# Patient Record
Sex: FEMALE | Race: WHITE | Employment: PART TIME | URBAN - METROPOLITAN AREA
[De-identification: names, ages, dates, MRNs, and addresses within clinical notes are randomized per-mention and may not be internally consistent; named-entity substitution may affect disease eponyms.]

---

## 2017-01-04 ENCOUNTER — MED REC SCAN ONLY (OUTPATIENT)
Dept: FAMILY MEDICINE CLINIC | Facility: CLINIC | Age: 37
End: 2017-01-04

## 2017-01-06 NOTE — TELEPHONE ENCOUNTER
From: Adithya Garnica  To:  Fatuma Cortes MD  Sent: 1/6/2017 3:25 PM CST  Subject: Medication Renewal Request    Original authorizing provider: MD Adithya Silva would like a refill of the following medications:  alprazolam 0.25 MG Oral T

## 2017-01-07 RX ORDER — ALPRAZOLAM 0.25 MG/1
0.25 TABLET ORAL NIGHTLY PRN
Qty: 20 TABLET | Refills: 0 | Status: SHIPPED
Start: 2017-01-07 | End: 2017-03-22

## 2017-01-14 ENCOUNTER — OFFICE VISIT (OUTPATIENT)
Dept: FAMILY MEDICINE CLINIC | Facility: CLINIC | Age: 37
End: 2017-01-14

## 2017-01-14 DIAGNOSIS — E53.8 B12 DEFICIENCY: Primary | ICD-10-CM

## 2017-01-14 PROCEDURE — 96372 THER/PROPH/DIAG INJ SC/IM: CPT | Performed by: NURSE PRACTITIONER

## 2017-01-14 RX ORDER — CYANOCOBALAMIN 1000 UG/ML
1000 INJECTION INTRAMUSCULAR; SUBCUTANEOUS ONCE
Status: COMPLETED | OUTPATIENT
Start: 2017-01-14 | End: 2017-01-14

## 2017-01-14 RX ADMIN — CYANOCOBALAMIN 1000 MCG: 1000 INJECTION INTRAMUSCULAR; SUBCUTANEOUS at 09:57:00

## 2017-02-03 ENCOUNTER — APPOINTMENT (OUTPATIENT)
Dept: LAB | Age: 37
End: 2017-02-03
Attending: FAMILY MEDICINE
Payer: COMMERCIAL

## 2017-02-03 ENCOUNTER — NURSE ONLY (OUTPATIENT)
Dept: FAMILY MEDICINE CLINIC | Facility: CLINIC | Age: 37
End: 2017-02-03

## 2017-02-03 DIAGNOSIS — E53.8 VITAMIN B 12 DEFICIENCY: ICD-10-CM

## 2017-02-03 DIAGNOSIS — E53.8 B12 DEFICIENCY: Primary | ICD-10-CM

## 2017-02-03 LAB
FOLATE (FOLIC ACID), SERUM: 26 NG/ML (ref 8.7–24)
HAV AB SERPL IA-ACNC: >2000 PG/ML (ref 193–986)

## 2017-02-03 PROCEDURE — 82607 VITAMIN B-12: CPT

## 2017-02-03 PROCEDURE — 82746 ASSAY OF FOLIC ACID SERUM: CPT

## 2017-02-03 PROCEDURE — 96372 THER/PROPH/DIAG INJ SC/IM: CPT | Performed by: FAMILY MEDICINE

## 2017-02-03 PROCEDURE — 36415 COLL VENOUS BLD VENIPUNCTURE: CPT

## 2017-02-03 RX ORDER — CYANOCOBALAMIN 1000 UG/ML
1000 INJECTION INTRAMUSCULAR; SUBCUTANEOUS ONCE
Status: COMPLETED | OUTPATIENT
Start: 2017-02-03 | End: 2017-02-03

## 2017-02-03 RX ADMIN — CYANOCOBALAMIN 1000 MCG: 1000 INJECTION INTRAMUSCULAR; SUBCUTANEOUS at 14:05:00

## 2017-02-10 ENCOUNTER — TELEPHONE (OUTPATIENT)
Dept: FAMILY MEDICINE CLINIC | Facility: CLINIC | Age: 37
End: 2017-02-10

## 2017-02-10 DIAGNOSIS — E83.110 HEMOCHROMATOSIS, HEREDITARY (HCC): ICD-10-CM

## 2017-02-10 DIAGNOSIS — E53.8 FOLIC ACID DEFICIENCY: ICD-10-CM

## 2017-02-10 DIAGNOSIS — E53.8 VITAMIN B12 DEFICIENCY: Primary | ICD-10-CM

## 2017-02-10 NOTE — TELEPHONE ENCOUNTER
----- Message from Marion Baer MD sent at 2/10/2017 10:17 AM CST -----  Hold b12 for 1 month, then resume same, recheck b12 and folate in 3 months.

## 2017-02-17 NOTE — PROGRESS NOTES
Chief Complaint:   Patient presents with:  Medication Follow-Up    HPI:   This is a 43year old female presenting for follow up on MAKAYLA.     Patient's on Lexapro 10 mg daily denies any side effects doing well on it reports 3 month history of increased irrita Current Meds:    Current Outpatient Prescriptions:  escitalopram (LEXAPRO) 10 MG Oral Tab 1.5 tablet q daily Disp: 45 tablet Rfl: 2   alprazolam 0.25 MG Oral Tab Take 1 tablet (0.25 mg total) by mouth nightly as needed for Sleep.  Disp: 20 tablet Rfl: Positive for sleep disturbance. Negative for suicidal ideas, behavioral problems and agitation. The patient is nervous/anxious.         EXAM:   /80 mmHg  Pulse 64  Resp 16  Wt 163 lb  LMP 06/04/2014 Estimated body mass index is 28.17 kg/(m^2) as calcu time. She displays normal reflexes. No cranial nerve deficit, sensory deficit or motor deficit. Coordination and gait normal.   Skin: Skin is warm and dry. No lesion and no rash noted. No erythema. No pallor.  Does not exhibit alopecia  Psychiatric: Her beh

## 2017-02-27 RX ORDER — ESCITALOPRAM OXALATE 10 MG/1
TABLET ORAL
Qty: 30 TABLET | Refills: 2 | OUTPATIENT
Start: 2017-02-27

## 2017-02-28 RX ORDER — ERGOCALCIFEROL 1.25 MG/1
CAPSULE ORAL
Qty: 4 CAPSULE | Refills: 3 | OUTPATIENT
Start: 2017-02-28

## 2017-03-02 ENCOUNTER — PATIENT MESSAGE (OUTPATIENT)
Dept: FAMILY MEDICINE CLINIC | Facility: CLINIC | Age: 37
End: 2017-03-02

## 2017-03-03 NOTE — TELEPHONE ENCOUNTER
From: George Dobson  To: Hansa Walter MD  Sent: 3/2/2017 6:45 PM CST  Subject: Prescription Question    Hi Dr. Neville Schulte,    I'm wondering why I received a message via [employer] informing me that my prescription was denied for my VitD?  I thought at my last

## 2017-03-03 NOTE — TELEPHONE ENCOUNTER
Lay see result note dated 10/29/16. Patient advised per result note to take over the counter vitamin D, 5,000 units daily and to repeat her vitamin D lab in May. Advised to call if any questions or concerns.

## 2017-03-10 ENCOUNTER — APPOINTMENT (OUTPATIENT)
Dept: HEMATOLOGY/ONCOLOGY | Facility: HOSPITAL | Age: 37
End: 2017-03-10
Attending: INTERNAL MEDICINE
Payer: COMMERCIAL

## 2017-03-21 ENCOUNTER — NURSE ONLY (OUTPATIENT)
Dept: HEMATOLOGY/ONCOLOGY | Facility: HOSPITAL | Age: 37
End: 2017-03-21
Attending: INTERNAL MEDICINE
Payer: COMMERCIAL

## 2017-03-21 VITALS
OXYGEN SATURATION: 99 % | RESPIRATION RATE: 18 BRPM | BODY MASS INDEX: 29.38 KG/M2 | SYSTOLIC BLOOD PRESSURE: 144 MMHG | WEIGHT: 170 LBS | TEMPERATURE: 99 F | DIASTOLIC BLOOD PRESSURE: 89 MMHG | HEIGHT: 63.78 IN | HEART RATE: 72 BPM

## 2017-03-21 LAB — HEMOGLOBIN POC: 13.5 G/DL

## 2017-03-21 PROCEDURE — 36416 COLLJ CAPILLARY BLOOD SPEC: CPT

## 2017-03-21 PROCEDURE — 85018 HEMOGLOBIN: CPT

## 2017-03-21 PROCEDURE — 99195 PHLEBOTOMY: CPT

## 2017-03-21 NOTE — PROGRESS NOTES
Patient Name: Attila Ruiz  : 1980   Medical Record #: [id number]      Therapeutic Donor Physical and Record of Collection    Order:  Therapeutic Phlebotomy  Order Date:  2016  Expiration Date:  2017  Frequency:  Every three months.

## 2017-03-21 NOTE — PROGRESS NOTES
Patient Name: Charles Habermann  : 1980   Medical Record #: [id number]      Therapeutic Donor History    Donor History    Are you feeling ill or unhealthy today:  No    Do you currently have a cold, flu, sore throat, chills, respiratory infection, explain below:       Pathologist consulted:  No   Pathologist's name:   Outcome / Orders placed:

## 2017-03-22 RX ORDER — ALPRAZOLAM 0.25 MG/1
0.25 TABLET ORAL NIGHTLY PRN
Qty: 20 TABLET | Refills: 0 | Status: SHIPPED
Start: 2017-03-22 | End: 2017-05-19

## 2017-03-22 NOTE — TELEPHONE ENCOUNTER
From: Margo Elena  To:  Praveen White MD  Sent: 3/21/2017 8:36 PM CDT  Subject: Medication Renewal Request    Original authorizing provider: MD Margo Cisneros would like a refill of the following medications:  alprazolam 0.25 MG Oral

## 2017-05-08 ENCOUNTER — PATIENT MESSAGE (OUTPATIENT)
Dept: FAMILY MEDICINE CLINIC | Facility: CLINIC | Age: 37
End: 2017-05-08

## 2017-05-09 NOTE — TELEPHONE ENCOUNTER
From: Kiley Thomas  To: Kirstin Hurst MD  Sent: 5/8/2017 8:19 PM CDT  Subject: Non-Urgent Medical Question    Hi Dr. Nicole Holliday-    I have a f/u scheduled with you this Friday, May 11th and am wondering if I was supposed to do my labs before seeing you?  I

## 2017-05-10 ENCOUNTER — TELEPHONE (OUTPATIENT)
Dept: FAMILY MEDICINE CLINIC | Facility: CLINIC | Age: 37
End: 2017-05-10

## 2017-05-16 ENCOUNTER — TELEPHONE (OUTPATIENT)
Dept: FAMILY MEDICINE CLINIC | Facility: CLINIC | Age: 37
End: 2017-05-16

## 2017-05-16 RX ORDER — ESCITALOPRAM OXALATE 10 MG/1
TABLET ORAL
Qty: 45 TABLET | Refills: 2 | Status: SHIPPED | OUTPATIENT
Start: 2017-05-16 | End: 2017-06-22

## 2017-05-19 NOTE — PROGRESS NOTES
Chief Complaint:   Patient presents with:  Medication Follow-Up: refilled    HPI:   This is a 42year old female presenting for follow up on MAKAYLA.     Patient's on Lexapro 15 mg daily denies any side effects doing well on it reports better with increased dos Meds:    Current Outpatient Prescriptions:  Eszopiclone (LUNESTA) 2 MG Oral Tab Take 1 tablet (2 mg total) by mouth nightly as needed. Disp: 30 tablet Rfl: 2   ALPRAZolam 0.25 MG Oral Tab Take 1 tablet (0.25 mg total) by mouth nightly as needed for Sleep. Positive for sleep disturbance. Negative for suicidal ideas, behavioral problems and agitation. The patient is nervous/anxious.         EXAM:   /74 mmHg  Pulse 88  Temp(Src) 98.4 °F (36.9 °C) (Oral)  Resp 16  SpO2 97%  LMP 06/04/2014 Estimated body ma oriented to person, place, and time. She displays normal reflexes. No cranial nerve deficit, sensory deficit or motor deficit. Coordination and gait normal.   Skin: Skin is warm and dry. No lesion and no rash noted. No erythema. No pallor.  Does not exhibit

## 2017-06-01 ENCOUNTER — APPOINTMENT (OUTPATIENT)
Dept: LAB | Age: 37
End: 2017-06-01
Attending: INTERNAL MEDICINE
Payer: COMMERCIAL

## 2017-06-01 ENCOUNTER — LAB ENCOUNTER (OUTPATIENT)
Dept: LAB | Age: 37
End: 2017-06-01
Attending: FAMILY MEDICINE
Payer: COMMERCIAL

## 2017-06-01 DIAGNOSIS — Z13.0 SCREENING FOR ENDOCRINE, NUTRITIONAL, METABOLIC AND IMMUNITY DISORDER: ICD-10-CM

## 2017-06-01 DIAGNOSIS — Z13.29 SCREENING FOR ENDOCRINE, NUTRITIONAL, METABOLIC AND IMMUNITY DISORDER: ICD-10-CM

## 2017-06-01 DIAGNOSIS — E83.110 HEMOCHROMATOSIS, HEREDITARY (HCC): ICD-10-CM

## 2017-06-01 DIAGNOSIS — Z13.228 SCREENING FOR ENDOCRINE, NUTRITIONAL, METABOLIC AND IMMUNITY DISORDER: ICD-10-CM

## 2017-06-01 DIAGNOSIS — E55.9 VITAMIN D DEFICIENCY: ICD-10-CM

## 2017-06-01 DIAGNOSIS — E53.8 VITAMIN B12 DEFICIENCY: ICD-10-CM

## 2017-06-01 DIAGNOSIS — Z13.21 SCREENING FOR ENDOCRINE, NUTRITIONAL, METABOLIC AND IMMUNITY DISORDER: ICD-10-CM

## 2017-06-01 PROCEDURE — 82746 ASSAY OF FOLIC ACID SERUM: CPT

## 2017-06-01 PROCEDURE — 82306 VITAMIN D 25 HYDROXY: CPT

## 2017-06-01 PROCEDURE — 82607 VITAMIN B-12: CPT

## 2017-06-01 PROCEDURE — 83550 IRON BINDING TEST: CPT

## 2017-06-01 PROCEDURE — 80061 LIPID PANEL: CPT

## 2017-06-01 PROCEDURE — 80053 COMPREHEN METABOLIC PANEL: CPT

## 2017-06-01 PROCEDURE — 83540 ASSAY OF IRON: CPT

## 2017-06-01 PROCEDURE — 85025 COMPLETE CBC W/AUTO DIFF WBC: CPT

## 2017-06-01 PROCEDURE — 84443 ASSAY THYROID STIM HORMONE: CPT

## 2017-06-01 PROCEDURE — 82728 ASSAY OF FERRITIN: CPT

## 2017-06-02 ENCOUNTER — APPOINTMENT (OUTPATIENT)
Dept: HEMATOLOGY/ONCOLOGY | Facility: HOSPITAL | Age: 37
End: 2017-06-02
Attending: INTERNAL MEDICINE
Payer: COMMERCIAL

## 2017-06-02 VITALS
HEART RATE: 73 BPM | OXYGEN SATURATION: 98 % | SYSTOLIC BLOOD PRESSURE: 151 MMHG | DIASTOLIC BLOOD PRESSURE: 89 MMHG | BODY MASS INDEX: 29.47 KG/M2 | TEMPERATURE: 97 F | WEIGHT: 170.5 LBS | HEIGHT: 63.78 IN | RESPIRATION RATE: 18 BRPM

## 2017-06-02 DIAGNOSIS — E83.110 HEMOCHROMATOSIS, HEREDITARY (HCC): Primary | ICD-10-CM

## 2017-06-02 PROCEDURE — 99213 OFFICE O/P EST LOW 20 MIN: CPT | Performed by: INTERNAL MEDICINE

## 2017-06-02 NOTE — PROGRESS NOTES
Education Record    Learner:  Patient    Disease / Diagnosis:    Barriers / Limitations:  None   Comments:    Method:  Discussion   Comments:    General Topics:  Plan of care reviewed   Comments:    Outcome:  Shows understanding   Comments:    Repeat labs

## 2017-06-02 NOTE — PROGRESS NOTES
Cancer Center Progress Note  Patient Name: George Dobson   YOB: 1980   Medical Record Number: [id number]     Attending Physician: Jl Diaz M.D. Date of Visit: 6/2/2017       Chief Complaint:  Patient presents with:   Sade Stain polyps    HYSTERECTOMY  6/2014    Comment partial, still has ovaries       Family History:  Family History   Problem Relation Age of Onset   • Hypertension Mother    • Hypertension Brother    • Hypertension Sister    • Cancer Maternal Aunt 36     breast diplopia. No yellowing. Hematologic/Lymphatic Normal - No easy bruising or bleeding. No tender or palpable lymph nodes. Respiratory No dyspnea, pleuritic chest pain, cough or hemoptysis.    Cardiovascular No anginal chest pain, palpitations or orthopne 6/1/2017 12:00   Glucose Latest Ref Range: 70-99 mg/dL 97   Sodium Latest Ref Range: 136-144 mmol/L 144   Potassium Latest Ref Range: 3.6-5.1 mmol/L 4.6   Chloride Latest Ref Range: 101-111 mmol/L 107   Carbon Dioxide, Total Latest Ref Range: 22.0-32.0 mmo Signed by: Gopi Espinal M.D.   [facility]

## 2017-06-22 RX ORDER — ESCITALOPRAM OXALATE 10 MG/1
15 TABLET ORAL DAILY
Qty: 45 TABLET | Refills: 0 | Status: SHIPPED | OUTPATIENT
Start: 2017-06-22 | End: 2017-10-02

## 2017-06-22 NOTE — TELEPHONE ENCOUNTER
From: Mian Keith  To:  Bárbara Frausto MD  Sent: 6/21/2017 1:07 PM CDT  Subject: Medication Renewal Request    Original authorizing provider: MD Mian Wolf would like a refill of the following medications:  ESCITALOPRAM 10 MG Oral

## 2017-08-21 ENCOUNTER — PATIENT MESSAGE (OUTPATIENT)
Dept: FAMILY MEDICINE CLINIC | Facility: CLINIC | Age: 37
End: 2017-08-21

## 2017-08-21 DIAGNOSIS — R61 EXCESSIVE SWEATING: Primary | ICD-10-CM

## 2017-08-21 DIAGNOSIS — R23.2 HOT FLASHES: ICD-10-CM

## 2017-08-21 NOTE — TELEPHONE ENCOUNTER
From: Dakotah Jose  To: Nabil Huffman MD  Sent: 8/21/2017 9:06 AM CDT  Subject: Non-Urgent Medical Question    Hi Dr. Mounika Garnica,    I'm having constant hot flashes and night sweats that have been getting worse over the past few months.  I'm due for my labs

## 2017-08-24 ENCOUNTER — LAB ENCOUNTER (OUTPATIENT)
Dept: LAB | Age: 37
End: 2017-08-24
Attending: FAMILY MEDICINE
Payer: COMMERCIAL

## 2017-08-24 DIAGNOSIS — R23.2 HOT FLASHES: ICD-10-CM

## 2017-08-24 DIAGNOSIS — E83.110 HEMOCHROMATOSIS, HEREDITARY (HCC): ICD-10-CM

## 2017-08-24 DIAGNOSIS — R61 EXCESSIVE SWEATING: ICD-10-CM

## 2017-08-24 LAB
BASOPHILS # BLD AUTO: 0.05 X10(3) UL (ref 0–0.1)
BASOPHILS NFR BLD AUTO: 0.6 %
DEPRECATED HBV CORE AB SER IA-ACNC: 33.4 NG/ML (ref 10–291)
EOSINOPHIL # BLD AUTO: 0.06 X10(3) UL (ref 0–0.3)
EOSINOPHIL NFR BLD AUTO: 0.8 %
ERYTHROCYTE [DISTWIDTH] IN BLOOD BY AUTOMATED COUNT: 12.8 % (ref 11.5–16)
HCT VFR BLD AUTO: 40.1 % (ref 34–50)
HGB BLD-MCNC: 13.6 G/DL (ref 12–16)
IMMATURE GRANULOCYTE COUNT: 0.01 X10(3) UL (ref 0–1)
IMMATURE GRANULOCYTE RATIO %: 0.1 %
IRON SATURATION: 30 % (ref 13–45)
IRON: 110 UG/DL (ref 28–170)
LYMPHOCYTES # BLD AUTO: 2.31 X10(3) UL (ref 0.9–4)
LYMPHOCYTES NFR BLD AUTO: 29.5 %
MCH RBC QN AUTO: 32.6 PG (ref 27–33.2)
MCHC RBC AUTO-ENTMCNC: 33.9 G/DL (ref 31–37)
MCV RBC AUTO: 96.2 FL (ref 81–100)
MONOCYTES # BLD AUTO: 0.52 X10(3) UL (ref 0.1–0.6)
MONOCYTES NFR BLD AUTO: 6.6 %
NEUTROPHIL ABS PRELIM: 4.88 X10 (3) UL (ref 1.3–6.7)
NEUTROPHILS # BLD AUTO: 4.88 X10(3) UL (ref 1.3–6.7)
NEUTROPHILS NFR BLD AUTO: 62.4 %
PLATELET # BLD AUTO: 280 10(3)UL (ref 150–450)
RBC # BLD AUTO: 4.17 X10(6)UL (ref 3.8–5.1)
RED CELL DISTRIBUTION WIDTH-SD: 45.1 FL (ref 35.1–46.3)
TOTAL IRON BINDING CAPACITY: 361 UG/DL (ref 298–536)
TRANSFERRIN: 242 MG/DL (ref 200–360)
WBC # BLD AUTO: 7.8 X10(3) UL (ref 4–13)

## 2017-08-24 PROCEDURE — 82728 ASSAY OF FERRITIN: CPT

## 2017-08-24 PROCEDURE — 83001 ASSAY OF GONADOTROPIN (FSH): CPT

## 2017-08-24 PROCEDURE — 82627 DEHYDROEPIANDROSTERONE: CPT

## 2017-08-24 PROCEDURE — 83540 ASSAY OF IRON: CPT

## 2017-08-24 PROCEDURE — 83550 IRON BINDING TEST: CPT

## 2017-08-24 PROCEDURE — 85025 COMPLETE CBC W/AUTO DIFF WBC: CPT

## 2017-08-24 PROCEDURE — 83002 ASSAY OF GONADOTROPIN (LH): CPT

## 2017-08-25 LAB
FSH: 6.1 MIU/ML
LH: 3 MIU/ML

## 2017-08-27 LAB — DEHYDROEPIANDROSTERONE BY TMS: 2.27 NG/ML

## 2017-09-18 RX ORDER — ESCITALOPRAM OXALATE 10 MG/1
TABLET ORAL
Qty: 45 TABLET | Refills: 0 | Status: SHIPPED | OUTPATIENT
Start: 2017-09-18 | End: 2017-10-02

## 2017-09-18 NOTE — TELEPHONE ENCOUNTER
ESCITALOPRAM 10 MG TABLET  Will file in chart as: ESCITALOPRAM 10 MG Oral Tab  The source prescription was discontinued on 6/22/2017 by Bárbara Frausto MD.  TAKE 1 & 1/2 TABLETS BY MOUTH DAILY       Disp: 45 tablet Refills: 0    Class: Normal Start: 9/18/201

## 2017-10-02 RX ORDER — ESCITALOPRAM OXALATE 10 MG/1
TABLET ORAL
Qty: 135 TABLET | Refills: 0 | Status: SHIPPED | OUTPATIENT
Start: 2017-10-02 | End: 2018-01-02

## 2017-10-02 RX ORDER — ESCITALOPRAM OXALATE 10 MG/1
TABLET ORAL
Qty: 45 TABLET | Refills: 0 | Status: SHIPPED | OUTPATIENT
Start: 2017-10-02 | End: 2017-10-02

## 2017-10-02 NOTE — TELEPHONE ENCOUNTER
[facility] states per ins it has to be a 90day refill and we keep responding with a 30 day ins will not cover

## 2017-10-02 NOTE — TELEPHONE ENCOUNTER
LF: today but patient needs a 90 day supply. Please approve or deny pending 90 day supply Rx for patient. Thank you!

## 2017-10-11 DIAGNOSIS — E83.110 HEMOCHROMATOSIS, HEREDITARY (HCC): Primary | ICD-10-CM

## 2017-10-20 ENCOUNTER — OFFICE VISIT (OUTPATIENT)
Dept: FAMILY MEDICINE CLINIC | Facility: CLINIC | Age: 37
End: 2017-10-20

## 2017-10-20 VITALS
BODY MASS INDEX: 30.38 KG/M2 | OXYGEN SATURATION: 98 % | SYSTOLIC BLOOD PRESSURE: 122 MMHG | WEIGHT: 175.75 LBS | TEMPERATURE: 98 F | HEART RATE: 74 BPM | RESPIRATION RATE: 16 BRPM | HEIGHT: 63.78 IN | DIASTOLIC BLOOD PRESSURE: 74 MMHG

## 2017-10-20 DIAGNOSIS — E83.110 HEMOCHROMATOSIS, HEREDITARY (HCC): ICD-10-CM

## 2017-10-20 DIAGNOSIS — Z13.29 SCREENING FOR ENDOCRINE, NUTRITIONAL, METABOLIC AND IMMUNITY DISORDER: ICD-10-CM

## 2017-10-20 DIAGNOSIS — Z00.00 ANNUAL PHYSICAL EXAM: Primary | ICD-10-CM

## 2017-10-20 DIAGNOSIS — Z13.228 SCREENING FOR ENDOCRINE, NUTRITIONAL, METABOLIC AND IMMUNITY DISORDER: ICD-10-CM

## 2017-10-20 DIAGNOSIS — Z13.0 SCREENING FOR ENDOCRINE, NUTRITIONAL, METABOLIC AND IMMUNITY DISORDER: ICD-10-CM

## 2017-10-20 DIAGNOSIS — Z71.3 WEIGHT LOSS COUNSELING, ENCOUNTER FOR: ICD-10-CM

## 2017-10-20 DIAGNOSIS — Z23 NEEDS FLU SHOT: ICD-10-CM

## 2017-10-20 DIAGNOSIS — Z13.21 SCREENING FOR ENDOCRINE, NUTRITIONAL, METABOLIC AND IMMUNITY DISORDER: ICD-10-CM

## 2017-10-20 DIAGNOSIS — Z79.899 HIGH RISK MEDICATION USE: ICD-10-CM

## 2017-10-20 PROCEDURE — 90471 IMMUNIZATION ADMIN: CPT | Performed by: FAMILY MEDICINE

## 2017-10-20 PROCEDURE — 99395 PREV VISIT EST AGE 18-39: CPT | Performed by: FAMILY MEDICINE

## 2017-10-20 PROCEDURE — 90686 IIV4 VACC NO PRSV 0.5 ML IM: CPT | Performed by: FAMILY MEDICINE

## 2017-10-20 PROCEDURE — 99213 OFFICE O/P EST LOW 20 MIN: CPT | Performed by: FAMILY MEDICINE

## 2017-10-20 RX ORDER — PHENTERMINE HYDROCHLORIDE 15 MG/1
15 CAPSULE ORAL EVERY MORNING
Qty: 30 CAPSULE | Refills: 0 | Status: SHIPPED | OUTPATIENT
Start: 2017-10-20 | End: 2017-12-15 | Stop reason: ALTCHOICE

## 2017-10-20 NOTE — PROGRESS NOTES
Chief Complaint:   Patient presents with:  Physical: Weight loss     HPI:   This is a 42year old female presenting for physical and weight loss information.   Patient reports labs have been stable and has been working on diet control and exercise reports h TAKE 1 & 1/2 TABLETS BY MOUTH DAILY Disp: 135 tablet Rfl: 0   Eszopiclone (LUNESTA) 2 MG Oral Tab Take 1 tablet (2 mg total) by mouth nightly as needed.  Disp: 30 tablet Rfl: 2   ALPRAZolam 0.25 MG Oral Tab Take 1 tablet (0.25 mg total) by mouth nightly as EXAM:   /74 (BP Location: Right arm, Patient Position: Sitting, Cuff Size: adult)   Pulse 74   Temp 98 °F (36.7 °C) (Oral)   Resp 16   Ht 63.78\"   Wt 175 lb 12 oz   LMP 06/04/2014   SpO2 98%   BMI 30.38 kg/m²  Estimated body mass index is 30. 3 oriented to person, place, and time. She displays normal reflexes. No cranial nerve deficit, sensory deficit or motor deficit. Coordination and gait normal.   Skin: Skin is warm and dry. No lesion and no rash noted. No erythema. No pallor.  Does not exhibit

## 2017-10-20 NOTE — MR AVS SNAPSHOT
Fredi Simpson is a 53 y.o. female who presents today for her medical conditions/complaints as noted below. Fredi Simpson is c/o of Health Maintenance (flu, pneumonia) and Diarrhea (blood in stool)  Diarrhea earlier this week. Last couple days having blood and mucous in her stools. HPI:     Visit Information    Have you changed or started any medications since your last visit including any over-the-counter medicines, vitamins, or herbal medicines? no   Are you having any side effects from any of your medications? -  no  Have you stopped taking any of your medications? Is so, why? -  no    Have you seen any other physician or provider since your last visit? No  Have you had any other diagnostic tests since your last visit? No  Have you been seen in the emergency room and/or had an admission to a hospital since we last saw you? No  Have you had your routine dental cleaning in the past 6 months? yes -     Have you activated your [employer] account? If not, what are your barriers? Yes     Patient Care Team:  Lobo Lazar MD as PCP - General (Family Medicine)  Wili Segovia MD as Consulting Physician (Hematology and Oncology)  Jessica Campoverde MD as Consulting Physician (Obstetrics & Gynecology)  Ruth Ann Romano MD as Consulting Physician (Urology)  Fabi Reed MD as Surgeon (Radiation Oncology)    Medical History Review  Past Medical, Family, and Social History reviewed and  contribute to the patient presenting condition    Health Maintenance   Topic Date Due    Pneumococcal med risk (1 of 1 - PPSV23) 09/28/1990    Flu vaccine (1) 09/01/2017    Breast cancer screen  10/03/2017    Lipid screen  09/19/2019    DTaP/Tdap/Td vaccine (2 - Td) 07/03/2027    HIV screen  Completed       Past Medical History:   Diagnosis Date    Breast cancer ([facility] [address] 02/2013    RT.  BREAST-LUMPECTOMY FOLLOWED BY CHEMO AND RADIATION     Hypertension 10/2013    ON RX      Past Surgical History:   [address]cedure [address]  [id number]  [address] [id number]  [phone]               Thank you for choosing us for your health care visit with DELMIS Braga.   We are glad to serve you and happy to provide you with this summary o Commonly known as:  LEXAPRO           Eszopiclone 2 MG Tabs   Take 1 tablet (2 mg total) by mouth nightly as needed. Commonly known as:  LUNESTA           ibuprofen 200 MG Tabs   Take 200 mg by mouth every 6 (six) hours as needed for Pain.    Commonly kno

## 2017-11-17 ENCOUNTER — OFFICE VISIT (OUTPATIENT)
Dept: FAMILY MEDICINE CLINIC | Facility: CLINIC | Age: 37
End: 2017-11-17

## 2017-11-17 VITALS
HEART RATE: 84 BPM | WEIGHT: 170 LBS | RESPIRATION RATE: 16 BRPM | TEMPERATURE: 98 F | SYSTOLIC BLOOD PRESSURE: 120 MMHG | DIASTOLIC BLOOD PRESSURE: 70 MMHG | HEIGHT: 63.78 IN | BODY MASS INDEX: 29.38 KG/M2 | OXYGEN SATURATION: 98 %

## 2017-11-17 DIAGNOSIS — Z71.3 WEIGHT LOSS COUNSELING, ENCOUNTER FOR: ICD-10-CM

## 2017-11-17 DIAGNOSIS — K59.00 CONSTIPATION, UNSPECIFIED CONSTIPATION TYPE: Primary | ICD-10-CM

## 2017-11-17 DIAGNOSIS — E55.9 VITAMIN D DEFICIENCY: ICD-10-CM

## 2017-11-17 PROCEDURE — 99214 OFFICE O/P EST MOD 30 MIN: CPT | Performed by: FAMILY MEDICINE

## 2017-11-17 RX ORDER — ERGOCALCIFEROL 1.25 MG/1
50000 CAPSULE ORAL WEEKLY
Qty: 12 CAPSULE | Refills: 0 | Status: SHIPPED | OUTPATIENT
Start: 2017-11-17 | End: 2018-02-15

## 2017-11-17 RX ORDER — LUBIPROSTONE 8 UG/1
CAPSULE, GELATIN COATED ORAL
Qty: 60 CAPSULE | Refills: 3 | Status: SHIPPED | OUTPATIENT
Start: 2017-11-17 | End: 2017-11-20

## 2017-11-17 RX ORDER — PHENTERMINE HYDROCHLORIDE 30 MG/1
30 CAPSULE ORAL EVERY MORNING
Qty: 30 CAPSULE | Refills: 0 | Status: SHIPPED | OUTPATIENT
Start: 2017-11-17 | End: 2018-02-12 | Stop reason: ALTCHOICE

## 2017-11-17 NOTE — PROGRESS NOTES
Chief Complaint:   Patient presents with: Follow - Up: Medication     HPI:   This is a 42year old female presenting for follow up on weight loss, doing well with phentermine 15 mg q daily.  Patient has lost about 5 lbs since last visit, diet's better and Allergies:    Seasonal                  Current Meds:    Current Outpatient Prescriptions:  AMITIZA 8 MCG Oral Cap TAKE 1 CAPSULE BY MOUTH TWICE DAILY Disp: 60 capsule Rfl: 3   Phentermine HCl 30 MG Oral Cap Take 1 capsule (30 mg total) by mouth every stiffness. Skin: Negative for color change, pallor, rash and wound. Allergic/Immunologic: Negative for environmental allergies, food allergies and immunocompromised state.    Neurological: Negative for dizziness, weakness, light-headedness and headaches rales. She exhibits no tenderness. Abdominal: Soft. Bowel sounds are normal. She exhibits no distension and no mass. There is no hepatosplenomegaly. There is no tenderness. There is no rebound and no guarding. No hernia.    Musculoskeletal: Normal range o

## 2017-11-20 DIAGNOSIS — K59.00 CONSTIPATION, UNSPECIFIED CONSTIPATION TYPE: ICD-10-CM

## 2017-11-20 RX ORDER — LUBIPROSTONE 8 UG/1
CAPSULE, GELATIN COATED ORAL
Qty: 180 CAPSULE | Refills: 0 | Status: SHIPPED | OUTPATIENT
Start: 2017-11-20 | End: 2018-04-27

## 2017-12-12 ENCOUNTER — LAB ENCOUNTER (OUTPATIENT)
Dept: LAB | Age: 37
End: 2017-12-12
Attending: FAMILY MEDICINE
Payer: COMMERCIAL

## 2017-12-12 DIAGNOSIS — Z13.21 SCREENING FOR ENDOCRINE, NUTRITIONAL, METABOLIC AND IMMUNITY DISORDER: ICD-10-CM

## 2017-12-12 DIAGNOSIS — Z13.0 SCREENING FOR ENDOCRINE, NUTRITIONAL, METABOLIC AND IMMUNITY DISORDER: ICD-10-CM

## 2017-12-12 DIAGNOSIS — Z13.228 SCREENING FOR ENDOCRINE, NUTRITIONAL, METABOLIC AND IMMUNITY DISORDER: ICD-10-CM

## 2017-12-12 DIAGNOSIS — Z13.29 SCREENING FOR ENDOCRINE, NUTRITIONAL, METABOLIC AND IMMUNITY DISORDER: ICD-10-CM

## 2017-12-12 PROCEDURE — 82728 ASSAY OF FERRITIN: CPT

## 2017-12-12 PROCEDURE — 85025 COMPLETE CBC W/AUTO DIFF WBC: CPT

## 2017-12-12 PROCEDURE — 84443 ASSAY THYROID STIM HORMONE: CPT

## 2017-12-12 PROCEDURE — 82306 VITAMIN D 25 HYDROXY: CPT

## 2017-12-12 PROCEDURE — 80061 LIPID PANEL: CPT

## 2017-12-12 PROCEDURE — 83540 ASSAY OF IRON: CPT

## 2017-12-12 PROCEDURE — 80053 COMPREHEN METABOLIC PANEL: CPT

## 2017-12-12 PROCEDURE — 83550 IRON BINDING TEST: CPT

## 2017-12-13 DIAGNOSIS — R73.9 ELEVATED BLOOD SUGAR: Primary | ICD-10-CM

## 2017-12-15 ENCOUNTER — OFFICE VISIT (OUTPATIENT)
Dept: FAMILY MEDICINE CLINIC | Facility: CLINIC | Age: 37
End: 2017-12-15

## 2017-12-15 VITALS
WEIGHT: 163 LBS | HEART RATE: 100 BPM | TEMPERATURE: 98 F | DIASTOLIC BLOOD PRESSURE: 80 MMHG | BODY MASS INDEX: 28.17 KG/M2 | SYSTOLIC BLOOD PRESSURE: 120 MMHG | OXYGEN SATURATION: 99 % | RESPIRATION RATE: 16 BRPM | HEIGHT: 63.78 IN

## 2017-12-15 DIAGNOSIS — Z71.3 WEIGHT LOSS COUNSELING, ENCOUNTER FOR: Primary | ICD-10-CM

## 2017-12-15 DIAGNOSIS — Z79.899 HIGH RISK MEDICATION USE: ICD-10-CM

## 2017-12-15 PROCEDURE — 99214 OFFICE O/P EST MOD 30 MIN: CPT | Performed by: FAMILY MEDICINE

## 2017-12-15 RX ORDER — PHENTERMINE HYDROCHLORIDE 15 MG/1
15 CAPSULE ORAL EVERY MORNING
Qty: 30 CAPSULE | Refills: 0 | Status: SHIPPED | OUTPATIENT
Start: 2018-01-15 | End: 2018-02-16

## 2017-12-15 RX ORDER — PHENTERMINE HYDROCHLORIDE 37.5 MG/1
37.5 CAPSULE ORAL EVERY MORNING
Qty: 30 CAPSULE | Refills: 0 | Status: SHIPPED | OUTPATIENT
Start: 2017-12-15 | End: 2017-12-23 | Stop reason: CLARIF

## 2017-12-15 NOTE — PROGRESS NOTES
Chief Complaint:   Patient presents with:  Medication Follow-Up    HPI:   This is a 42year old female presenting for follow-up on weight loss counseling. Patient's weight is down about 70 pounds.   Patient has been taking phentermine 30 mg denies any side Meds:    Current Outpatient Prescriptions:  Phentermine HCl 37.5 MG Oral Cap Take 1 capsule (37.5 mg total) by mouth every morning.  Disp: 30 capsule Rfl: 0   [START ON 1/15/2018] Phentermine HCl 15 MG Oral Cap Take 1 capsule (15 mg total) by mouth every mo diarrhea, blood in stool and abdominal distention. Endocrine: Negative for cold intolerance, heat intolerance, polydipsia, polyphagia and polyuria. Genitourinary: Negative for dysuria, hematuria, flank pain and difficulty urinating.    Musculoskeletal: Cardiovascular: Normal rate, regular rhythm, normal heart sounds and intact distal pulses. Exam reveals no gallop and no friction rub. No murmur heard. Edema not present. Carotid bruit not present.   Pulmonary/Chest: Effort normal and breath sounds n heart problems possible  in the future, not known effect were explained. Pt understands all the directives    -as above, will increase to 37.5mg then drop to 15 mg in mid jan/ f/u visit in end of Feb or March, needs weight check in 1 month.

## 2017-12-22 ENCOUNTER — PATIENT MESSAGE (OUTPATIENT)
Dept: FAMILY MEDICINE CLINIC | Facility: CLINIC | Age: 37
End: 2017-12-22

## 2017-12-22 NOTE — TELEPHONE ENCOUNTER
From: Neptali Route  To: Mynor Caceres MD  Sent: 12/22/2017 4:16 PM CST  Subject: Prescription Question    Hi Dr. Rafaela Tavarez been taking the phentermine 37.5 for a week now and I think I would prefer to go back down to the 30.  I have not been able

## 2017-12-22 NOTE — TELEPHONE ENCOUNTER
Patient requesting to decrease phentermine from 37.5mg to 30mg because she's feeling irritable and unable to sleep while taking this dose. Please advise. Thanks.

## 2017-12-23 RX ORDER — PHENTERMINE HYDROCHLORIDE 30 MG/1
30 CAPSULE ORAL EVERY MORNING
Qty: 30 CAPSULE | Refills: 0 | Status: SHIPPED
Start: 2017-12-23 | End: 2018-02-12

## 2017-12-23 RX ORDER — PHENTERMINE HYDROCHLORIDE 30 MG/1
30 CAPSULE ORAL EVERY MORNING
Qty: 30 CAPSULE | Refills: 0 | Status: SHIPPED | OUTPATIENT
Start: 2017-12-23 | End: 2017-12-23

## 2018-01-02 DIAGNOSIS — G47.09 OTHER INSOMNIA: ICD-10-CM

## 2018-01-02 RX ORDER — ESCITALOPRAM OXALATE 10 MG/1
10 TABLET ORAL DAILY
Qty: 90 TABLET | Refills: 0 | Status: SHIPPED
Start: 2018-01-02 | End: 2018-04-08

## 2018-01-02 RX ORDER — ALPRAZOLAM 0.25 MG/1
0.25 TABLET ORAL NIGHTLY PRN
Qty: 20 TABLET | Refills: 2 | Status: SHIPPED
Start: 2018-01-02 | End: 2018-06-30

## 2018-01-02 RX ORDER — ESCITALOPRAM OXALATE 5 MG/1
5 TABLET ORAL DAILY
Qty: 90 TABLET | Refills: 0 | Status: SHIPPED | OUTPATIENT
Start: 2018-01-02 | End: 2018-04-08

## 2018-01-02 RX ORDER — ESZOPICLONE 2 MG/1
2 TABLET, FILM COATED ORAL NIGHTLY PRN
Qty: 30 TABLET | Refills: 2 | Status: SHIPPED
Start: 2018-01-02 | End: 2018-06-30

## 2018-01-02 NOTE — TELEPHONE ENCOUNTER
From: Kamar Lea  Sent: 1/2/2018 1:44 PM CST  Subject: Medication Renewal Request    Kamar Lea would like a refill of the following medications:     Eszopiclone (LUNESTA) 2 MG Oral Tab Mc Javier MD]     ALPRAZolam 0.25 MG Oral Tab [Deondre Flowers

## 2018-01-02 NOTE — TELEPHONE ENCOUNTER
LOV: 12/15/17  Patient is requesting to not cut the Lexapro for the 15mg daily, requested 5mg tablet. 10mg and 5mg tablet pending. Lunesta and Xanax LF: 5/19/17  Please approve or deny pending Rx. Thank you!

## 2018-02-16 ENCOUNTER — OFFICE VISIT (OUTPATIENT)
Dept: FAMILY MEDICINE CLINIC | Facility: CLINIC | Age: 38
End: 2018-02-16

## 2018-02-16 VITALS
SYSTOLIC BLOOD PRESSURE: 118 MMHG | RESPIRATION RATE: 16 BRPM | BODY MASS INDEX: 27.31 KG/M2 | HEIGHT: 63.78 IN | TEMPERATURE: 98 F | HEART RATE: 74 BPM | DIASTOLIC BLOOD PRESSURE: 76 MMHG | WEIGHT: 158 LBS

## 2018-02-16 DIAGNOSIS — Z79.899 HIGH RISK MEDICATION USE: ICD-10-CM

## 2018-02-16 DIAGNOSIS — Z71.3 WEIGHT LOSS COUNSELING, ENCOUNTER FOR: ICD-10-CM

## 2018-02-16 PROCEDURE — 99214 OFFICE O/P EST MOD 30 MIN: CPT | Performed by: FAMILY MEDICINE

## 2018-02-16 RX ORDER — PHENTERMINE HYDROCHLORIDE 15 MG/1
15 CAPSULE ORAL EVERY MORNING
Qty: 30 CAPSULE | Refills: 0 | Status: SHIPPED
Start: 2018-02-16 | End: 2018-05-11 | Stop reason: ALTCHOICE

## 2018-02-16 RX ORDER — PHENTERMINE HYDROCHLORIDE 15 MG/1
15 CAPSULE ORAL EVERY MORNING
Qty: 30 CAPSULE | Refills: 0 | Status: SHIPPED | OUTPATIENT
Start: 2018-03-16 | End: 2018-05-11 | Stop reason: ALTCHOICE

## 2018-02-17 NOTE — PROGRESS NOTES
Chief Complaint:   Patient presents with:  Medication Follow-Up    HPI:   This is a 42year old female presenting for follow-up on weight loss counseling. Patient's weight is down about 1 pounds.   Patient has been taking phentermine 15 mg denies any side Maternal Aunt 40     breast   • Cancer Paternal Aunt 43     breast   • Breast Cancer Paternal Aunt 43     dx age- 43   • Cancer Maternal Aunt 35     Cervical   • Cancer Maternal Aunt 48     colon     Allergies:    Seasonal                  Current Meds: Negative for chills, diaphoresis, fatigue and fever. HENT: Negative for congestion, ear pain, facial swelling, postnasal drip, rhinorrhea, sinus pressure, sore throat and voice change.     Eyes: Negative for photophobia, pain, discharge, redness, itching present  Left Ear: Tympanic membrane and ear canal normal. Tympanic membrane is not erythematous.  No cerumen present  Nose: Nose normal.   Mouth/Throat: Oropharynx is clear and moist.   Eyes: Conjunctivae are normal. Pupils are equal, round, and reactive t mouth every morning. Dispense: 30 capsule; Refill: 0    2. BMI 28.0-28.9,adult  -stable, CPM  - Phentermine HCl 15 MG Oral Cap; Take 1 capsule (15 mg total) by mouth every morning. Dispense: 30 capsule; Refill: 0  - Phentermine HCl 15 MG Oral Cap;  Take 1

## 2018-02-20 ENCOUNTER — TELEPHONE (OUTPATIENT)
Dept: OBGYN CLINIC | Facility: CLINIC | Age: 38
End: 2018-02-20

## 2018-02-20 NOTE — TELEPHONE ENCOUNTER
Pt has requested records to be sent to Dr Aleksey Valencia; pap, colpo,IUD  Insertion/removal from any years of treatment   [address], [place]  [address]  Sent to scan February 21,2018

## 2018-04-09 RX ORDER — ESCITALOPRAM OXALATE 5 MG/1
5 TABLET ORAL DAILY
Qty: 90 TABLET | Refills: 0 | Status: SHIPPED
Start: 2018-04-09 | End: 2018-07-07

## 2018-04-09 RX ORDER — ESCITALOPRAM OXALATE 10 MG/1
10 TABLET ORAL DAILY
Qty: 90 TABLET | Refills: 0 | Status: SHIPPED
Start: 2018-04-09 | End: 2018-07-07

## 2018-04-09 NOTE — TELEPHONE ENCOUNTER
From: Lanette Anaya  Sent: 4/8/2018 6:56 PM CDT  Subject: Medication Renewal Request    Lanette Anaya would like a refill of the following medications:     escitalopram 10 MG Oral Tab Ingrid Villatoro MD]     escitalopram (LEXAPRO) 5 MG Oral Tab [Deondre ABDI

## 2018-04-18 ENCOUNTER — PATIENT MESSAGE (OUTPATIENT)
Dept: FAMILY MEDICINE CLINIC | Facility: CLINIC | Age: 38
End: 2018-04-18

## 2018-04-19 NOTE — TELEPHONE ENCOUNTER
From: Leesa Manzo  To: Ivonne Woodruff MD  Sent: 4/18/2018 2:00 PM CDT  Subject: Visit Follow-up Question    Hi Dr. Delia Simmons,    I'm checking in with you to let you know that I've been doing well with taking the phentermine e/o day.  My energy levels are

## 2018-04-27 DIAGNOSIS — K59.00 CONSTIPATION, UNSPECIFIED CONSTIPATION TYPE: ICD-10-CM

## 2018-04-30 RX ORDER — LUBIPROSTONE 8 UG/1
CAPSULE, GELATIN COATED ORAL
Qty: 180 CAPSULE | Refills: 0 | Status: SHIPPED | OUTPATIENT
Start: 2018-04-30 | End: 2018-10-18

## 2018-04-30 NOTE — TELEPHONE ENCOUNTER
From: Betty Diaz  Sent: 4/27/2018 8:48 PM CDT  Subject: Medication Renewal Request    Betty Diaz would like a refill of the following medications:     AMITIZA 8 MCG Oral Cap Liliana Arizmendi MD]    Preferred pharmacy: [facility] [id number] [place] [address]

## 2018-05-03 ENCOUNTER — LAB ENCOUNTER (OUTPATIENT)
Dept: LAB | Age: 38
End: 2018-05-03
Attending: INTERNAL MEDICINE
Payer: COMMERCIAL

## 2018-05-03 ENCOUNTER — PATIENT MESSAGE (OUTPATIENT)
Dept: HEMATOLOGY/ONCOLOGY | Facility: HOSPITAL | Age: 38
End: 2018-05-03

## 2018-05-03 DIAGNOSIS — E83.110 HEMOCHROMATOSIS, HEREDITARY (HCC): ICD-10-CM

## 2018-05-03 PROCEDURE — 83540 ASSAY OF IRON: CPT

## 2018-05-03 PROCEDURE — 85025 COMPLETE CBC W/AUTO DIFF WBC: CPT

## 2018-05-03 PROCEDURE — 83550 IRON BINDING TEST: CPT

## 2018-05-03 PROCEDURE — 82728 ASSAY OF FERRITIN: CPT

## 2018-05-03 NOTE — TELEPHONE ENCOUNTER
From: Mian Keith  To: America Gunn MD  Sent: 5/3/2018 11:41 AM CDT  Subject: Non-Urgent Medical Question    Hi Dr. Gisela East,    I'm wondering if I need to have any labs drawn before my appointment with you next Friday, May 11th?  If so, coul

## 2018-05-11 ENCOUNTER — OFFICE VISIT (OUTPATIENT)
Dept: HEMATOLOGY/ONCOLOGY | Facility: HOSPITAL | Age: 38
End: 2018-05-11
Attending: INTERNAL MEDICINE
Payer: COMMERCIAL

## 2018-05-11 VITALS
DIASTOLIC BLOOD PRESSURE: 84 MMHG | WEIGHT: 162.5 LBS | SYSTOLIC BLOOD PRESSURE: 124 MMHG | HEART RATE: 73 BPM | RESPIRATION RATE: 20 BRPM | TEMPERATURE: 98 F | BODY MASS INDEX: 28 KG/M2

## 2018-05-11 DIAGNOSIS — E83.110 HEMOCHROMATOSIS, HEREDITARY (HCC): Primary | ICD-10-CM

## 2018-05-11 PROCEDURE — 99213 OFFICE O/P EST LOW 20 MIN: CPT | Performed by: INTERNAL MEDICINE

## 2018-05-11 NOTE — PROGRESS NOTES
Cancer Center Progress Note  Patient Name: Ajith Aaron   YOB: 1980   Medical Record Number: [id number]     Attending Physician: Francesca Victoria M.D. Date of Visit: 5/11/2018    Chief Complaint:  Patient presents with:   Follow DILATION/CURETTAGE,DIAGNOSTIC      Comment: removal benign polyps  10/21/2013: ENDOMETRIAL ABLATION  6-2012: ENDOMETRIAL BX CONJUNCT W/COLPOSCOPY  6/2014: HYSTERECTOMY      Comment: partial, still has ovaries    Family History:  Family History   Problem Re 0.25 MG Oral Tab, Take 1 tablet (0.25 mg total) by mouth nightly as needed for Sleep., Disp: 20 tablet, Rfl: 2  •  tacrolimus 0.1 % External Ointment, Apply to chest and jaw area twice daily for 2 weeks then once daily as needed, Disp: 30 g, Rfl: 0  •  Oxy reactive and equal.   Hematologic/Lymphatic Normal - No petechiae or purpura. No tender or palpable lymph nodes in the cervical, supraclavicular, axillary or inguinal area. Respiratory Normal - Lungs are clear to auscultation, no rhonchi or wheezing. Eosinophils Absolute Latest Ref Range: 0.00 - 0.30 x10(3) uL 0.09   Basophils Absolute Latest Ref Range: 0.00 - 0.10 x10(3) uL 0.04   Immature Granulocyte Absolute Latest Ref Range: 0.00 - 1.00 x10(3) uL 0.01   Neutrophils % Latest Units: % 63.8   Lympho

## 2018-05-22 ENCOUNTER — OFFICE VISIT (OUTPATIENT)
Dept: FAMILY MEDICINE CLINIC | Facility: CLINIC | Age: 38
End: 2018-05-22

## 2018-05-22 VITALS
DIASTOLIC BLOOD PRESSURE: 80 MMHG | HEIGHT: 64.5 IN | WEIGHT: 170 LBS | SYSTOLIC BLOOD PRESSURE: 120 MMHG | HEART RATE: 51 BPM | TEMPERATURE: 98 F | BODY MASS INDEX: 28.67 KG/M2 | OXYGEN SATURATION: 98 % | RESPIRATION RATE: 16 BRPM

## 2018-05-22 DIAGNOSIS — M54.16 LEFT LUMBAR RADICULITIS: Primary | ICD-10-CM

## 2018-05-22 PROCEDURE — 99214 OFFICE O/P EST MOD 30 MIN: CPT | Performed by: FAMILY MEDICINE

## 2018-05-22 RX ORDER — CYCLOBENZAPRINE HCL 10 MG
10 TABLET ORAL NIGHTLY PRN
Qty: 30 TABLET | Refills: 0 | Status: SHIPPED | OUTPATIENT
Start: 2018-05-22 | End: 2018-06-11

## 2018-05-22 RX ORDER — METHYLPREDNISOLONE 4 MG/1
TABLET ORAL
Qty: 1 KIT | Refills: 0 | Status: SHIPPED | OUTPATIENT
Start: 2018-05-22 | End: 2018-06-18 | Stop reason: ALTCHOICE

## 2018-05-23 NOTE — PROGRESS NOTES
HPI:   Patient complains of back pain.     Symptoms for: few weeks  Location: lumbar, worse along left side  Severity: moderate  Pain radiates to: left posterior thigh  Caused by:  No recent injury or trauma   Worsened by: prolonged standing and sitting  Re no bowel or fecal incontinences    EXAM:   /80 (BP Location: Right arm, Patient Position: Sitting, Cuff Size: adult)   Pulse 51   Temp 98.3 °F (36.8 °C) (Oral)   Resp 16   Ht 64.5\"   Wt 170 lb   LMP 06/04/2014   SpO2 98%   BMI 28.73 kg/m²    GENERAL

## 2018-05-28 ENCOUNTER — PATIENT MESSAGE (OUTPATIENT)
Dept: FAMILY MEDICINE CLINIC | Facility: CLINIC | Age: 38
End: 2018-05-28

## 2018-05-29 NOTE — TELEPHONE ENCOUNTER
From: Marcella Rich  To: Argelia Lopez MD  Sent: 5/28/2018 10:58 AM CDT  Subject: Visit Follow-up Question    Hi Dr. Mary Campbell, I wanted to thank you so much for taking the time to come over and see me last week at the walk-in clinic!  Unfortunat

## 2018-05-30 ENCOUNTER — PATIENT MESSAGE (OUTPATIENT)
Dept: FAMILY MEDICINE CLINIC | Facility: CLINIC | Age: 38
End: 2018-05-30

## 2018-05-30 DIAGNOSIS — M79.605 BILATERAL LEG PAIN: Primary | ICD-10-CM

## 2018-05-30 DIAGNOSIS — M79.604 BILATERAL LEG PAIN: Primary | ICD-10-CM

## 2018-05-30 DIAGNOSIS — R20.0 LEG NUMBNESS: ICD-10-CM

## 2018-05-31 NOTE — TELEPHONE ENCOUNTER
Please see above message. LOV: 5/22/18 for lumbar radiculitis. Patient was given Medrol Dose Pack and Flexeril. Please advise. Thank you!

## 2018-05-31 NOTE — TELEPHONE ENCOUNTER
From: Ajith Aaron  To: Fatuma Lamar MD  Sent: 5/30/2018 7:24 PM CDT  Subject: Visit Follow-up Question    Hi Dr. Jonathon Gallegos     I have scheduled PT for next week, but am wondering if I should be concerned about increased pain and weakness in my leg.  The

## 2018-06-01 RX ORDER — DICLOFENAC SODIUM 75 MG/1
75 TABLET, DELAYED RELEASE ORAL 2 TIMES DAILY PRN
Qty: 30 TABLET | Refills: 1 | Status: SHIPPED | OUTPATIENT
Start: 2018-06-01 | End: 2018-07-06

## 2018-06-01 NOTE — TELEPHONE ENCOUNTER
Yes, patient needs lumbar xray to check for bulging disc. I sent over NSAID. Also please have her see Dr. Sarah Patel because of neurological symptoms associated with back.

## 2018-06-04 ENCOUNTER — APPOINTMENT (OUTPATIENT)
Dept: PHYSICAL THERAPY | Age: 38
End: 2018-06-04
Attending: FAMILY MEDICINE
Payer: COMMERCIAL

## 2018-06-11 ENCOUNTER — OFFICE VISIT (OUTPATIENT)
Dept: PHYSICAL THERAPY | Age: 38
End: 2018-06-11
Attending: FAMILY MEDICINE
Payer: COMMERCIAL

## 2018-06-11 DIAGNOSIS — M54.16 LEFT LUMBAR RADICULITIS: ICD-10-CM

## 2018-06-11 PROCEDURE — 97110 THERAPEUTIC EXERCISES: CPT | Performed by: PHYSICAL THERAPIST

## 2018-06-11 PROCEDURE — 97162 PT EVAL MOD COMPLEX 30 MIN: CPT | Performed by: PHYSICAL THERAPIST

## 2018-06-11 NOTE — PROGRESS NOTES
SPINE EVALUATION:   Referring Physician: Dr. Gavi Romo  Diagnosis: Lumbar radiculitis     Date of Service: 6/11/2018     PATIENT SUMMARY   Luis Trotter is a 34year old y/o female who presents to therapy today with complaints of L sided low back pain for referring Nikita Saenz to [facility].     Precautions:  None  OBJECTIVE:   Observation/Posture: uncomfortable sitting with equal WB, more on R hip, decreased lumbar lordosis due to lumbar paraspinal guarding  Gait: pelvic drop due to functional h is evolving due to changing clinical characteristics indicating moderate complexity.      PLAN OF CARE:    Goals:    · Pt will improve lower abdominal recruitment to perform proper isometric contraction without requiring verbal or tactile cuing to promote a care.    X___________________________________________________ Date____________________    Certification From: 9/26/3479  To:9/9/2018

## 2018-06-13 ENCOUNTER — OFFICE VISIT (OUTPATIENT)
Dept: PHYSICAL THERAPY | Age: 38
End: 2018-06-13
Attending: FAMILY MEDICINE
Payer: COMMERCIAL

## 2018-06-13 PROCEDURE — 97110 THERAPEUTIC EXERCISES: CPT | Performed by: PHYSICAL THERAPIST

## 2018-06-13 NOTE — PROGRESS NOTES
Dx: Lumbar radiculitis         Authorized # of Visits:  n/a         Next MD visit: none scheduled  Fall Risk: standard         Precautions: n/a             Subjective: Pt states she felt good for 1 hr after PT.  However, pain returned in L leg and hip regio Date:  TX#: 6/ Date:  TX#: 7/ Date:  TX#: 8/   THERE EX 40  MINS         Recumbent bike X 5 min         Slant board stretch 30 sec X 3         Dynamic HS stretch in neutral, IR, ER 5 sec X 5         Pelvic tilt X 20         TA bracing X 20         Hip add

## 2018-06-18 ENCOUNTER — OFFICE VISIT (OUTPATIENT)
Dept: SURGERY | Facility: CLINIC | Age: 38
End: 2018-06-18

## 2018-06-18 ENCOUNTER — OFFICE VISIT (OUTPATIENT)
Dept: PHYSICAL THERAPY | Age: 38
End: 2018-06-18
Attending: FAMILY MEDICINE
Payer: COMMERCIAL

## 2018-06-18 VITALS — HEART RATE: 80 BPM | SYSTOLIC BLOOD PRESSURE: 130 MMHG | DIASTOLIC BLOOD PRESSURE: 90 MMHG

## 2018-06-18 DIAGNOSIS — M54.16 LUMBAR RADICULOPATHY: Primary | ICD-10-CM

## 2018-06-18 PROCEDURE — 99204 OFFICE O/P NEW MOD 45 MIN: CPT | Performed by: PHYSICIAN ASSISTANT

## 2018-06-18 PROCEDURE — 97110 THERAPEUTIC EXERCISES: CPT | Performed by: PHYSICAL THERAPIST

## 2018-06-18 NOTE — PATIENT INSTRUCTIONS
Refill policies:    • Allow 2-3 business days for refills; controlled substances may take longer.   • Contact your pharmacy at least 5 days prior to running out of medication and have them send an electronic request or submit request through the “request re entire amount billed. Precertification and Prior Authorizations: If your physician has recommended that you have a procedure or additional testing performed.   [facility]) will contact your insurance carrier to obtain pre-certi

## 2018-06-18 NOTE — H&P
Neurosurgery Clinic Visit  2018    Zamora Every PCP:  Kim Mascorro MD    1980 MRN [id number]       CC:  Left Leg Pain    HPI:    Emi Newton is a very pleasant 34year old female who presents with 3-4 months of left leg pain.   No preceding inju oz/week       Comment: rare 1-2/month    Drug use:  No              Sexual activity: Yes               Partners with: Male       Birth control/protection: Vasectomy, Hysterectomy    Other Topics            Concern  Caffeine Concern        Not Asked    Comme Lower extremity strength:    Iliopsoas Quad Hamstring D-Flexion EHL P-Flexion Eversion Inversion   Right 5 5 5 5 5 5 5 5   Left 5 5 5 5 5 5 5 5     Reflexes:    Biceps Brachioradialis Triceps Patellar Ankle Maile's Clonus   Right 2+ 2+ 2+ 2+ 2+ No N

## 2018-06-18 NOTE — PROGRESS NOTES
Dx: Lumbar radiculitis         Authorized # of Visits:  n/a         Next MD visit: none scheduled  Fall Risk: standard         Precautions: n/a             Subjective: Pt states she felt better for 3-4 hrs after PT.  Pt states she did her stretches and was stretch in neutral, IR, ER 5 sec X 5 Dynamic HS stretch in neutral, IR, ER 5 sec X 5        Pelvic tilt X 20 Standing lumbar ext X 20        TA bracing X 20 TA bracing X 10 in prone        Hip add isometric 5 sec X 20 Hip add isometric 5 sec X 20        Hi

## 2018-06-18 NOTE — PROGRESS NOTES
Location of Pain: left lower back, into the side of the leg and radiating down the side of the leg stopping at the calf, occasionally into the foot, occasionally numbness into foot,   Left leg has \"gave out\" a few times weakness in left leg.      Date Dianne How

## 2018-06-20 ENCOUNTER — OFFICE VISIT (OUTPATIENT)
Dept: PHYSICAL THERAPY | Age: 38
End: 2018-06-20
Attending: FAMILY MEDICINE
Payer: COMMERCIAL

## 2018-06-20 PROCEDURE — 97112 NEUROMUSCULAR REEDUCATION: CPT | Performed by: PHYSICAL THERAPIST

## 2018-06-20 PROCEDURE — 97110 THERAPEUTIC EXERCISES: CPT | Performed by: PHYSICAL THERAPIST

## 2018-06-20 NOTE — PROGRESS NOTES
Dx: Lumbar radiculitis         Authorized # of Visits:  n/a         Next MD visit: none scheduled  Fall Risk: standard         Precautions: n/a             Subjective: Pt states she was feeling better after last visit until the next morning.  However, pain sec X 3 Slant board stretch 30 sec X 3 Slant board stretch 30 sec X 3       Dynamic HS stretch in neutral, IR, ER 5 sec X 5 Dynamic HS stretch in neutral, IR, ER 5 sec X 5 Dynamic HS stretch in neutral, IR, ER 5 sec X 5       Pelvic tilt X 20 Standing lumb

## 2018-06-25 ENCOUNTER — OFFICE VISIT (OUTPATIENT)
Dept: PHYSICAL THERAPY | Age: 38
End: 2018-06-25
Attending: FAMILY MEDICINE
Payer: COMMERCIAL

## 2018-06-25 PROCEDURE — 97112 NEUROMUSCULAR REEDUCATION: CPT | Performed by: PHYSICAL THERAPIST

## 2018-06-25 PROCEDURE — 97110 THERAPEUTIC EXERCISES: CPT | Performed by: PHYSICAL THERAPIST

## 2018-06-26 NOTE — PROGRESS NOTES
Dx: Lumbar radiculitis         Authorized # of Visits:  n/a         Next MD visit: none scheduled  Fall Risk: standard         Precautions: n/a             Subjective: Pt states she had difficulty sleeping for the last 3 nights due to increased leg and aida min Recumbent bike X 5 min      Slant board stretch 30 sec X 3 Slant board stretch 30 sec X 3 Slant board stretch 30 sec X 3 Supine pelvic tilt X 20      Dynamic HS stretch in neutral, IR, ER 5 sec X 5 Dynamic HS stretch in neutral, IR, ER 5 sec X 5 Dynami

## 2018-06-29 ENCOUNTER — HOSPITAL ENCOUNTER (OUTPATIENT)
Dept: MRI IMAGING | Age: 38
Discharge: HOME | End: 2018-06-29
Attending: PHYSICIAN ASSISTANT
Payer: COMMERCIAL

## 2018-06-29 DIAGNOSIS — M54.16 LUMBAR RADICULOPATHY: ICD-10-CM

## 2018-06-29 PROCEDURE — 72148 MRI LUMBAR SPINE W/O DYE: CPT | Performed by: PHYSICIAN ASSISTANT

## 2018-06-30 ENCOUNTER — PATIENT MESSAGE (OUTPATIENT)
Dept: SURGERY | Facility: CLINIC | Age: 38
End: 2018-06-30

## 2018-06-30 DIAGNOSIS — M54.16 LUMBAR RADICULOPATHY: Primary | ICD-10-CM

## 2018-06-30 DIAGNOSIS — G47.09 OTHER INSOMNIA: ICD-10-CM

## 2018-06-30 NOTE — TELEPHONE ENCOUNTER
From: Luis Trotter  Sent: 6/30/2018 8:55 AM CDT  Subject: Medication Renewal Request    Luis Trotter would like a refill of the following medications:     Eszopiclone (LUNESTA) 2 MG Oral Tab Antonietta Hopkins MD]     ALPRAZolam 0.25 MG Oral Tab [Deondre ABDI

## 2018-07-02 RX ORDER — ESZOPICLONE 2 MG/1
2 TABLET, FILM COATED ORAL NIGHTLY PRN
Qty: 30 TABLET | Refills: 2 | Status: SHIPPED
Start: 2018-07-02 | End: 2019-01-13

## 2018-07-02 RX ORDER — ALPRAZOLAM 0.25 MG/1
0.25 TABLET ORAL NIGHTLY PRN
Qty: 20 TABLET | Refills: 2 | Status: SHIPPED
Start: 2018-07-02 | End: 2019-01-03

## 2018-07-02 NOTE — TELEPHONE ENCOUNTER
From: Charles Habermann  To: Robles Nelson MD  Sent: 6/30/2018 9:17 AM CDT  Subject: Test Results Question    I received the results from your PA, Selam Romo regarding my MRI yesterday and I would like to see the pain doctor for injections to help with the pain a

## 2018-07-03 ENCOUNTER — APPOINTMENT (OUTPATIENT)
Dept: PHYSICAL THERAPY | Age: 38
End: 2018-07-03
Attending: FAMILY MEDICINE
Payer: COMMERCIAL

## 2018-07-06 ENCOUNTER — OFFICE VISIT (OUTPATIENT)
Dept: SURGERY | Facility: CLINIC | Age: 38
End: 2018-07-06

## 2018-07-06 VITALS
HEIGHT: 64.5 IN | SYSTOLIC BLOOD PRESSURE: 136 MMHG | BODY MASS INDEX: 28.67 KG/M2 | WEIGHT: 170 LBS | DIASTOLIC BLOOD PRESSURE: 86 MMHG

## 2018-07-06 DIAGNOSIS — M54.16 LUMBAR RADICULOPATHY: Primary | ICD-10-CM

## 2018-07-06 PROCEDURE — 99203 OFFICE O/P NEW LOW 30 MIN: CPT | Performed by: ANESTHESIOLOGY

## 2018-07-06 RX ORDER — CYCLOBENZAPRINE HCL 10 MG
10 TABLET ORAL 3 TIMES DAILY PRN
Qty: 60 TABLET | Refills: 0 | Status: SHIPPED | OUTPATIENT
Start: 2018-07-06 | End: 2018-08-20

## 2018-07-06 NOTE — PATIENT INSTRUCTIONS
Refill policies:    • Allow 2-3 business days for refills; controlled substances may take longer.   • Contact your pharmacy at least 5 days prior to running out of medication and have them send an electronic request or submit request through the “request re entire amount billed. Precertification and Prior Authorizations: If your physician has recommended that you have a procedure or additional testing performed.   [facility]) will contact your insurance carrier to obtain pre-certi prior to the procedure if you are experiencing any symptoms of infection such as cough, fever, chills, urinary symptoms, or have recently been prescribed antibiotics, have open wounds, have recently had surgery or dental procedures.     As you will be unabl (Celecoxib)                 HERBAL SUPPLEMENTS  5 days                            Fish oil, krill oil, vitamin E              Insurance Authorization:   Most insurances are now requiring a preauthorization for all procedures.   In the event that your insura

## 2018-07-06 NOTE — PROGRESS NOTES
HPI:    Patient ID: Vianca Branham is a 34year old female. HPI    Review of Systems         Current Outpatient Prescriptions:  Eszopiclone (LUNESTA) 2 MG Oral Tab Take 1 tablet (2 mg total) by mouth nightly as needed.  Disp: 30 tablet Rfl: 2   ALPRAZo Maximum Pain  10    Distribution of Pain:    left    Quality of Pain:   aching, numbness, throbbing, tingling and shooting    Origin of Pain:    Other none noted    Aggravating Factors:    Sitting and Other laying, bending    Past Treatments for Current Pa

## 2018-07-06 NOTE — H&P
Name: Curt Franks   : 1980   DOS: 2018     Chief complaint: Low back pain    History of present illness:  Curt Franks is a 34year old female with a history of low back pain since her 25s.   In fact, she had an epidural steroid injecti Disp: 90 tablet Rfl: 0   Ketoconazole 2 % External Shampoo Apply to the scalp few times weekly Disp: 120 mL Rfl: 0   ibuprofen 200 MG Oral Tab Take 200 mg by mouth every 6 (six) hours as needed for Pain.  Disp:  Rfl:    Meth-Hyo-M Bl-Na Phos-Ph Sal (URIBEL) lymphadenopathy.   Motor Examination:    (R)   (L)  Deltoid:      5    5  Biceps:       5    5  Triceps:      5    5  Wrist Extension:     5    5  Wrist Flexsion:                 5    5  Finger Extension:     5    5  Finger Flexsion:     5    5    Neurologi

## 2018-07-09 RX ORDER — ESCITALOPRAM OXALATE 5 MG/1
5 TABLET ORAL DAILY
Qty: 90 TABLET | Refills: 0 | Status: SHIPPED | OUTPATIENT
Start: 2018-07-09 | End: 2018-10-15

## 2018-07-09 RX ORDER — ESCITALOPRAM OXALATE 10 MG/1
10 TABLET ORAL DAILY
Qty: 90 TABLET | Refills: 0 | Status: SHIPPED | OUTPATIENT
Start: 2018-07-09 | End: 2018-10-15

## 2018-07-09 NOTE — TELEPHONE ENCOUNTER
Medication(s) to Refill:   Pending Prescriptions Disp Refills    ESCITALOPRAM 5 MG Oral Tab [Pharmacy Med Name: ESCITALOPRAM 5 MG TABLET] 90 tablet 0     Sig: TAKE 1 TABLET (5 MG TOTAL) BY MOUTH DAILY.  TAKE ALONG WITH 10MG TABLET FOR A TOTAL OF 15MG DAILY

## 2018-07-10 ENCOUNTER — APPOINTMENT (OUTPATIENT)
Dept: PHYSICAL THERAPY | Age: 38
End: 2018-07-10
Attending: FAMILY MEDICINE
Payer: COMMERCIAL

## 2018-07-11 ENCOUNTER — TELEPHONE (OUTPATIENT)
Dept: SURGERY | Facility: CLINIC | Age: 38
End: 2018-07-11

## 2018-07-11 NOTE — TELEPHONE ENCOUNTER
Spoke to [facility] at [facility], codes [place] are valid and billable, no prior authorization or predetermination required.    Call reference: [id number]  Call time 11:24

## 2018-07-12 ENCOUNTER — APPOINTMENT (OUTPATIENT)
Dept: PHYSICAL THERAPY | Age: 38
End: 2018-07-12
Attending: FAMILY MEDICINE
Payer: COMMERCIAL

## 2018-07-13 ENCOUNTER — TELEPHONE (OUTPATIENT)
Dept: SURGERY | Facility: CLINIC | Age: 38
End: 2018-07-13

## 2018-07-13 NOTE — TELEPHONE ENCOUNTER
Left message for patient, confirmed procedure date of 7/18/18 and to be checked in at outpatient registration at 2:00 pm. Patient instructed to call pre-procedure line before procedure at [phone].  Patient instructed to call office if there are additio

## 2018-07-16 ENCOUNTER — APPOINTMENT (OUTPATIENT)
Dept: PHYSICAL THERAPY | Age: 38
End: 2018-07-16
Attending: FAMILY MEDICINE
Payer: COMMERCIAL

## 2018-07-18 ENCOUNTER — APPOINTMENT (OUTPATIENT)
Dept: PHYSICAL THERAPY | Age: 38
End: 2018-07-18
Attending: FAMILY MEDICINE
Payer: COMMERCIAL

## 2018-07-18 ENCOUNTER — APPOINTMENT (OUTPATIENT)
Dept: GENERAL RADIOLOGY | Facility: HOSPITAL | Age: 38
End: 2018-07-18
Attending: ANESTHESIOLOGY
Payer: COMMERCIAL

## 2018-07-18 ENCOUNTER — HOSPITAL ENCOUNTER (OUTPATIENT)
Facility: HOSPITAL | Age: 38
Setting detail: HOSPITAL OUTPATIENT SURGERY
Discharge: HOME | End: 2018-07-18
Attending: ANESTHESIOLOGY | Admitting: ANESTHESIOLOGY
Payer: COMMERCIAL

## 2018-07-18 ENCOUNTER — SURGERY (OUTPATIENT)
Age: 38
End: 2018-07-18

## 2018-07-18 VITALS
RESPIRATION RATE: 16 BRPM | SYSTOLIC BLOOD PRESSURE: 128 MMHG | DIASTOLIC BLOOD PRESSURE: 96 MMHG | TEMPERATURE: 98 F | OXYGEN SATURATION: 99 % | HEART RATE: 88 BPM

## 2018-07-18 DIAGNOSIS — M54.16 LUMBAR RADICULOPATHY: ICD-10-CM

## 2018-07-18 PROCEDURE — 3E0R33Z INTRODUCTION OF ANTI-INFLAMMATORY INTO SPINAL CANAL, PERCUTANEOUS APPROACH: ICD-10-PCS | Performed by: ANESTHESIOLOGY

## 2018-07-18 PROCEDURE — 3E0R3BZ INTRODUCTION OF ANESTHETIC AGENT INTO SPINAL CANAL, PERCUTANEOUS APPROACH: ICD-10-PCS | Performed by: ANESTHESIOLOGY

## 2018-07-18 RX ORDER — ONDANSETRON 2 MG/ML
4 INJECTION INTRAMUSCULAR; INTRAVENOUS ONCE AS NEEDED
Status: DISCONTINUED | OUTPATIENT
Start: 2018-07-18 | End: 2018-07-18

## 2018-07-18 RX ORDER — DIPHENHYDRAMINE HYDROCHLORIDE 50 MG/ML
50 INJECTION INTRAMUSCULAR; INTRAVENOUS ONCE AS NEEDED
Status: DISCONTINUED | OUTPATIENT
Start: 2018-07-18 | End: 2018-07-18

## 2018-07-18 RX ORDER — DEXAMETHASONE SODIUM PHOSPHATE 10 MG/ML
INJECTION, SOLUTION INTRAMUSCULAR; INTRAVENOUS AS NEEDED
Status: DISCONTINUED | OUTPATIENT
Start: 2018-07-18 | End: 2018-07-18 | Stop reason: HOSPADM

## 2018-07-18 RX ORDER — LIDOCAINE HYDROCHLORIDE 10 MG/ML
INJECTION, SOLUTION EPIDURAL; INFILTRATION; INTRACAUDAL; PERINEURAL AS NEEDED
Status: DISCONTINUED | OUTPATIENT
Start: 2018-07-18 | End: 2018-07-18 | Stop reason: HOSPADM

## 2018-07-18 RX ORDER — 0.9 % SODIUM CHLORIDE 0.9 %
VIAL (ML) INJECTION AS NEEDED
Status: DISCONTINUED | OUTPATIENT
Start: 2018-07-18 | End: 2018-07-18 | Stop reason: HOSPADM

## 2018-07-18 NOTE — H&P
History & Physical Examination    Patient Name: Noah Porter  MRN: [id number]  CSN: [id number]  YOB: 1980    Pre-Operative Diagnosis:  Lumbar radiculopathy [M54.16]    Present Illness: Patient with lumbar radiculopathy here for transforami Performed by Jerry Ricks at [address] [place] [address],[address]  1-5506: COLPOSCOPY, CERVIX W/UPPER ADJACENT VAGINA; W/*      Comment: cervicitis  2013: DILATION/CURETTAGE,DIAGNOSTIC      Comment: removal benign polyps  10/21/2013: ENDOMETRIAL ABLAT

## 2018-07-18 NOTE — OPERATIVE REPORT
Debi Jeff[facility]  Operative Report  2018     Mian Keith Patient Status:  Hospital Outpatient Surgery    1980 MRN [id number]   [facility] SURGERY Attending Jose Gan MD   Hosp Day # 0 PCP Christi Lopez MD     Indication: Xiang with 10 mg of dexamethasone was injected without complication. The needle was withdrawn with stylet in situ. The patient tolerated procedure very well. The patient was observed until discharge criteria met.   Discharge instructions were given and patient

## 2018-07-23 ENCOUNTER — TELEPHONE (OUTPATIENT)
Dept: PAIN CLINIC | Facility: CLINIC | Age: 38
End: 2018-07-23

## 2018-07-23 ENCOUNTER — PATIENT MESSAGE (OUTPATIENT)
Dept: SURGERY | Facility: CLINIC | Age: 38
End: 2018-07-23

## 2018-07-23 NOTE — TELEPHONE ENCOUNTER
Follow-up call post pain procedure. Left message informing patient to contact the pain clinic at [phone] option #2 regarding any questions or concerns about recent pain procedure.

## 2018-07-23 NOTE — TELEPHONE ENCOUNTER
From: Marcella Rich  To: Isma Inafnte MD  Sent: 7/23/2018 12:41 PM CDT  Subject: Visit Follow-up Question    Hello,    I have a f/u scheduled with Dr. Sarah Patel on 7/30/18 and I'm wondering if I should keep this appointment or if I should cancel it?  I h

## 2018-07-25 ENCOUNTER — PATIENT MESSAGE (OUTPATIENT)
Dept: FAMILY MEDICINE CLINIC | Facility: CLINIC | Age: 38
End: 2018-07-25

## 2018-07-26 NOTE — TELEPHONE ENCOUNTER
From: Margo Elena  To: Praveen White MD  Sent: 7/25/2018 11:46 AM CDT  Subject: Non-Urgent Patricia Gina Dr. Law Aguirre,    I'm do for my annual skin check with the dermatologist and would like to transition from [facility] to [facility] for that.  Could y

## 2018-07-27 ENCOUNTER — TELEPHONE (OUTPATIENT)
Dept: SURGERY | Facility: CLINIC | Age: 38
End: 2018-07-27

## 2018-07-27 NOTE — TELEPHONE ENCOUNTER
Left message for patient, confirmed procedure date of 8/1/18 AT 2 PM to be checked in at outpatient registration. Patient instructed to call pre-procedure line before procedure at [phone].  Patient instructed to call office if there are additional ques

## 2018-08-01 ENCOUNTER — HOSPITAL ENCOUNTER (OUTPATIENT)
Facility: HOSPITAL | Age: 38
Setting detail: HOSPITAL OUTPATIENT SURGERY
Discharge: HOME | End: 2018-08-01
Attending: ANESTHESIOLOGY | Admitting: ANESTHESIOLOGY
Payer: COMMERCIAL

## 2018-08-01 ENCOUNTER — APPOINTMENT (OUTPATIENT)
Dept: GENERAL RADIOLOGY | Facility: HOSPITAL | Age: 38
End: 2018-08-01
Attending: ANESTHESIOLOGY
Payer: COMMERCIAL

## 2018-08-01 ENCOUNTER — SURGERY (OUTPATIENT)
Age: 38
End: 2018-08-01

## 2018-08-01 VITALS
WEIGHT: 165 LBS | TEMPERATURE: 99 F | SYSTOLIC BLOOD PRESSURE: 126 MMHG | DIASTOLIC BLOOD PRESSURE: 97 MMHG | OXYGEN SATURATION: 98 % | BODY MASS INDEX: 27.83 KG/M2 | RESPIRATION RATE: 18 BRPM | HEIGHT: 64.5 IN | HEART RATE: 89 BPM

## 2018-08-01 DIAGNOSIS — M54.16 LUMBAR RADICULOPATHY: ICD-10-CM

## 2018-08-01 PROCEDURE — 3E0R33Z INTRODUCTION OF ANTI-INFLAMMATORY INTO SPINAL CANAL, PERCUTANEOUS APPROACH: ICD-10-PCS | Performed by: ANESTHESIOLOGY

## 2018-08-01 PROCEDURE — 3E0R3BZ INTRODUCTION OF ANESTHETIC AGENT INTO SPINAL CANAL, PERCUTANEOUS APPROACH: ICD-10-PCS | Performed by: ANESTHESIOLOGY

## 2018-08-01 RX ORDER — DIPHENHYDRAMINE HYDROCHLORIDE 50 MG/ML
50 INJECTION INTRAMUSCULAR; INTRAVENOUS ONCE AS NEEDED
Status: DISCONTINUED | OUTPATIENT
Start: 2018-08-01 | End: 2018-08-01 | Stop reason: HOSPADM

## 2018-08-01 RX ORDER — DEXAMETHASONE SODIUM PHOSPHATE 10 MG/ML
INJECTION, SOLUTION INTRAMUSCULAR; INTRAVENOUS AS NEEDED
Status: DISCONTINUED | OUTPATIENT
Start: 2018-08-01 | End: 2018-08-01 | Stop reason: HOSPADM

## 2018-08-01 RX ORDER — LIDOCAINE HYDROCHLORIDE 10 MG/ML
INJECTION, SOLUTION EPIDURAL; INFILTRATION; INTRACAUDAL; PERINEURAL AS NEEDED
Status: DISCONTINUED | OUTPATIENT
Start: 2018-08-01 | End: 2018-08-01 | Stop reason: HOSPADM

## 2018-08-01 RX ORDER — ONDANSETRON 2 MG/ML
4 INJECTION INTRAMUSCULAR; INTRAVENOUS ONCE AS NEEDED
Status: DISCONTINUED | OUTPATIENT
Start: 2018-08-01 | End: 2018-08-01 | Stop reason: HOSPADM

## 2018-08-01 RX ORDER — 0.9 % SODIUM CHLORIDE 0.9 %
VIAL (ML) INJECTION AS NEEDED
Status: DISCONTINUED | OUTPATIENT
Start: 2018-08-01 | End: 2018-08-01 | Stop reason: HOSPADM

## 2018-08-01 NOTE — OPERATIVE REPORT
[facility]  Operative Report  2018     Concepción Sim Patient Status:  Hospital Outpatient Surgery    1980 MRN [id number]   [facility] SURGERY Attending Gena Avelar MD   Hosp Day # 0 PCP Alejandra Gallo MD     Indication: Martin Juares with 10 mg of dexamethasone was injected without complication. The needle was withdrawn with stylet in situ. The patient tolerated procedure very well. The patient was observed until discharge criteria met.   Discharge instructions were given and patient

## 2018-08-01 NOTE — H&P
History & Physical Examination    Patient Name: Mikaela Kenney  MRN: [id number]  CSN: [id number]  YOB: 1980    Pre-Operative Diagnosis:  Lumbar radiculopathy [M54.16]    Present Illness: Patient with lumbar radiculopathy here for transforami A at [address] [place] [address],[address]  9-0025: COLPOSCOPY, CERVIX W/UPPER ADJACENT VAGINA; W/*      Comment: cervicitis  2013: DILATION/CURETTAGE,DIAGNOSTIC      Comment: removal benign polyps  10/21/2013: ENDOMETRIAL ABLATION  6-2012: ENDOMETRIAL B

## 2018-08-03 ENCOUNTER — TELEPHONE (OUTPATIENT)
Dept: SURGERY | Facility: CLINIC | Age: 38
End: 2018-08-03

## 2018-08-03 NOTE — TELEPHONE ENCOUNTER
Left message for patient, confirmed procedure date of 8/8/18 and to be checked in at outpatient registration at 2:00 pm. Patient called pre-procedure line and understood instructions.  Patient instructed to call office if there are additional questions afte

## 2018-08-07 ENCOUNTER — TELEPHONE (OUTPATIENT)
Dept: SURGERY | Facility: CLINIC | Age: 38
End: 2018-08-07

## 2018-08-07 NOTE — TELEPHONE ENCOUNTER
LMTCB. Pt needs to be rescheduled. Per Dr. Priya Mccoy, she can be double booked for any time slot in the morning in order to accommodate her.

## 2018-08-10 ENCOUNTER — TELEPHONE (OUTPATIENT)
Dept: SURGERY | Facility: CLINIC | Age: 38
End: 2018-08-10

## 2018-08-10 NOTE — TELEPHONE ENCOUNTER
LMTCB, pt's VM does not have identifier; need to confirm appt date of 08/15 & remind of arrival time of 1:30 pm, confirm pt has called pre-procedure line, offer pt to call our office w/any questions after calling pre-procedure line.

## 2018-08-14 NOTE — TELEPHONE ENCOUNTER
Pt returned our call, confirmed appt date of 08/15 & remind of arrival time of 1:30 pm, confirm pt has called pre-procedure line, offered pt to call our office w/any questions after calling pre-procedure line.

## 2018-08-15 ENCOUNTER — SURGERY (OUTPATIENT)
Age: 38
End: 2018-08-15

## 2018-08-15 ENCOUNTER — HOSPITAL ENCOUNTER (OUTPATIENT)
Facility: HOSPITAL | Age: 38
Setting detail: HOSPITAL OUTPATIENT SURGERY
Discharge: HOME | End: 2018-08-15
Attending: ANESTHESIOLOGY | Admitting: ANESTHESIOLOGY
Payer: COMMERCIAL

## 2018-08-15 ENCOUNTER — APPOINTMENT (OUTPATIENT)
Dept: GENERAL RADIOLOGY | Facility: HOSPITAL | Age: 38
End: 2018-08-15
Attending: ANESTHESIOLOGY
Payer: COMMERCIAL

## 2018-08-15 VITALS
SYSTOLIC BLOOD PRESSURE: 134 MMHG | DIASTOLIC BLOOD PRESSURE: 97 MMHG | HEART RATE: 81 BPM | TEMPERATURE: 98 F | OXYGEN SATURATION: 100 % | RESPIRATION RATE: 16 BRPM

## 2018-08-15 DIAGNOSIS — M54.16 LUMBAR RADICULOPATHY: ICD-10-CM

## 2018-08-15 PROCEDURE — 3E0R33Z INTRODUCTION OF ANTI-INFLAMMATORY INTO SPINAL CANAL, PERCUTANEOUS APPROACH: ICD-10-PCS | Performed by: ANESTHESIOLOGY

## 2018-08-15 RX ORDER — ONDANSETRON 2 MG/ML
4 INJECTION INTRAMUSCULAR; INTRAVENOUS ONCE AS NEEDED
Status: DISCONTINUED | OUTPATIENT
Start: 2018-08-15 | End: 2018-08-15

## 2018-08-15 RX ORDER — LIDOCAINE HYDROCHLORIDE 10 MG/ML
INJECTION, SOLUTION EPIDURAL; INFILTRATION; INTRACAUDAL; PERINEURAL AS NEEDED
Status: DISCONTINUED | OUTPATIENT
Start: 2018-08-15 | End: 2018-08-15 | Stop reason: HOSPADM

## 2018-08-15 RX ORDER — DEXAMETHASONE SODIUM PHOSPHATE 10 MG/ML
INJECTION, SOLUTION INTRAMUSCULAR; INTRAVENOUS AS NEEDED
Status: DISCONTINUED | OUTPATIENT
Start: 2018-08-15 | End: 2018-08-15 | Stop reason: HOSPADM

## 2018-08-15 RX ORDER — 0.9 % SODIUM CHLORIDE 0.9 %
VIAL (ML) INJECTION AS NEEDED
Status: DISCONTINUED | OUTPATIENT
Start: 2018-08-15 | End: 2018-08-15 | Stop reason: HOSPADM

## 2018-08-15 RX ORDER — DIPHENHYDRAMINE HYDROCHLORIDE 50 MG/ML
50 INJECTION INTRAMUSCULAR; INTRAVENOUS ONCE AS NEEDED
Status: DISCONTINUED | OUTPATIENT
Start: 2018-08-15 | End: 2018-08-15

## 2018-08-15 NOTE — OPERATIVE REPORT
[facility]  Operative Report  8/15/2018     Vianca Branham Patient Status:  Hospital Outpatient Surgery    1980 MRN [id number]   [facility] SURGERY Attending Kings Maynard MD   Hosp Day # 0 PCP German Jarrett MD     Indication: Xiang with 10 mg of dexamethasone was injected without complication. The needle was withdrawn with stylet in situ. The patient tolerated procedure very well. The patient was observed until discharge criteria met.   Discharge instructions were given and patient

## 2018-08-15 NOTE — H&P
History & Physical Examination    Patient Name: Michael Wilson  MRN: [id number]  CSN: [id number]  YOB: 1980    Pre-Operative Diagnosis:  Lumbar radiculopathy [M54.16]    Present Illness: Patient with lumbar radiculopathy here for transforami Comment: Performed by Spike Simpson at [address]  5-4579: COLPOSCOPY, CERVIX W/UPPER ADJACENT VAGINA; W/*      Comment: cervicitis  2013: DILATION/CURETTAGE,DIAGNOSTIC      Comment: removal benign polyps  10/21/2013: Holden William

## 2018-08-21 RX ORDER — CYCLOBENZAPRINE HCL 10 MG
10 TABLET ORAL 3 TIMES DAILY PRN
Qty: 60 TABLET | Refills: 0 | Status: ON HOLD | OUTPATIENT
Start: 2018-08-21 | End: 2018-10-24

## 2018-08-21 NOTE — TELEPHONE ENCOUNTER
From: Juli Carrel  Sent: 8/20/2018 9:28 PM CDT  Subject: Medication Renewal Request    Juli Carrel would like a refill of the following medications:     Cyclobenzaprine HCl 10 MG Oral Tab Sabrina Sandoval MD]    Preferred pharmacy: [address], [address] [place] [address] [phone], [phone]

## 2018-09-07 ENCOUNTER — OFFICE VISIT (OUTPATIENT)
Dept: PAIN CLINIC | Facility: CLINIC | Age: 38
End: 2018-09-07
Payer: COMMERCIAL

## 2018-09-07 VITALS
HEIGHT: 65 IN | DIASTOLIC BLOOD PRESSURE: 86 MMHG | BODY MASS INDEX: 28.32 KG/M2 | SYSTOLIC BLOOD PRESSURE: 118 MMHG | HEART RATE: 87 BPM | WEIGHT: 170 LBS | OXYGEN SATURATION: 98 %

## 2018-09-07 DIAGNOSIS — M54.16 LUMBAR RADICULOPATHY: Primary | ICD-10-CM

## 2018-09-07 PROCEDURE — 99213 OFFICE O/P EST LOW 20 MIN: CPT | Performed by: ANESTHESIOLOGY

## 2018-09-07 NOTE — PROGRESS NOTES
Name: Tanner Craven   : 1980   DOS: 2018     Pain Clinic Follow Up Visit:   Patient presents with: Follow - Up: post injection. 75% improvement for back pain, left leg numbness/tingling improved 25%.  Pt reports numness and tingling in right needed.    Disp:  Rfl:          EXAM:   /86 (BP Location: Right arm, Patient Position: Sitting, Cuff Size: adult)   Pulse 87   Ht 65\"   Wt 170 lb   LMP 06/04/2014   SpO2 98%   BMI 28.29 kg/m²   General:  Patient is a(n) 34year old year old female in

## 2018-09-24 ENCOUNTER — OFFICE VISIT (OUTPATIENT)
Dept: SURGERY | Facility: CLINIC | Age: 38
End: 2018-09-24
Payer: COMMERCIAL

## 2018-09-24 ENCOUNTER — TELEPHONE (OUTPATIENT)
Dept: SURGERY | Facility: CLINIC | Age: 38
End: 2018-09-24

## 2018-09-24 VITALS — HEART RATE: 82 BPM | SYSTOLIC BLOOD PRESSURE: 120 MMHG | DIASTOLIC BLOOD PRESSURE: 80 MMHG

## 2018-09-24 DIAGNOSIS — M54.16 LUMBAR RADICULOPATHY: Primary | ICD-10-CM

## 2018-09-24 PROCEDURE — 99214 OFFICE O/P EST MOD 30 MIN: CPT | Performed by: NEUROLOGICAL SURGERY

## 2018-09-24 NOTE — PROGRESS NOTES
Pt is here for follow up for post in injection for the lumbar. Pt has had a series of 3 injections. Pt states that the pain in the lumbar is much better. Pt states that she still has numbness ans tingling in the left leg.

## 2018-09-24 NOTE — TELEPHONE ENCOUNTER
You are scheduled for Left L3-4 discectomy on 10/23/2018  with      Pre-op instructions discussed with patient and surgical packet provided:     · You will need to contact the Pre-admission department at [phone].  You will speak with a nurse hospital  · Post operative appointment to be made 2,6 weeks after surgery.       NO PCP clearance need  PA needed    CPT 54352  ICD-10: M54.16

## 2018-09-24 NOTE — PATIENT INSTRUCTIONS
Refill policies:    • Allow 2-3 business days for refills; controlled substances may take longer.   • Contact your pharmacy at least 5 days prior to running out of medication and have them send an electronic request or submit request through the “request re entire amount billed. Precertification and Prior Authorizations: If your physician has recommended that you have a procedure or additional testing performed.   [facility]) will contact your insurance carrier to obtain pre-certi you scheduled for all your pre-op testing required for your surgery. · You will need  pre operative labs which will include MRSA/MSSA testing. If positive you  will be contacted by our office with further instructions.    · No blood thinning medications, [facility] most Wednesdays from 4:00-5:00 pm.  Call Pre-admission Testing (PAT) to register at:  [phone].     Please park in the [address] [place] [address], check in at registration and meet by the fish tank in the [place] for your escort to class on the [place] [facility]

## 2018-09-25 NOTE — PROGRESS NOTES
[facility]  Neurosurgery Clinic Visit      HISTORY OF PRESENT ILLNESS:  George Dobson is a(n) 34year old female here for follow-up of LBP and LLE pain.   She had CHIP, still has pain that begins in her lower back and radiates down the Shreya Burkett MD at [address] [place] [address] OR  8/1/2018: TRANSFORAMINAL LUMBAR EPIDURAL STEROID INJECTION SINGLE   LEVEL; Left      Comment:  Performed by Shreya Burkett MD at [address] [place] [address] OR  7/18/2018: TRANSFORAMINAL LUMBAR EPIDURAL STEROID INJECTION SINGLE   LEVEL;  Left management    PLAN:  -plan for L L3-4 discectomy   -procedure, risks/benefits/alternatives discussed with patient    Bryan Savage MD  Neurological Surgeon  [facility]  [address] [facility], [address] [facility] [address], [address] [facility]

## 2018-10-01 ENCOUNTER — TELEPHONE (OUTPATIENT)
Dept: SURGERY | Facility: CLINIC | Age: 38
End: 2018-10-01

## 2018-10-03 NOTE — TELEPHONE ENCOUNTER
TOYA, pt doesn't have identifier on VM, need to inform her of $25.00 form fee; will also respond to pt's [employer] message informing her of $25.00 fee.  Forms placed in PSR bin

## 2018-10-05 NOTE — TELEPHONE ENCOUNTER
PA not required. See referral pool for additional information. Nothing further needed for upcoming surgery.

## 2018-10-08 NOTE — TELEPHONE ENCOUNTER
PAT calling stating they've been unable to get a hold of pt to scheduled PAT, asking if office can send pt Mychart message to contact PAT. Mychart message sent to patient.

## 2018-10-09 RX ORDER — ACETAMINOPHEN 500 MG
1000 TABLET ORAL ONCE
Status: CANCELLED | OUTPATIENT
Start: 2018-10-09 | End: 2018-10-09

## 2018-10-09 RX ORDER — SODIUM CHLORIDE, SODIUM LACTATE, POTASSIUM CHLORIDE, CALCIUM CHLORIDE 600; 310; 30; 20 MG/100ML; MG/100ML; MG/100ML; MG/100ML
INJECTION, SOLUTION INTRAVENOUS CONTINUOUS
Status: CANCELLED | OUTPATIENT
Start: 2018-10-09

## 2018-10-10 NOTE — TELEPHONE ENCOUNTER
Faxed to employer [employer] [phone]. Fax confirmed. Faxed to patient per her request [phone]. Fax confirmed. Copy sent to scanning.

## 2018-10-12 ENCOUNTER — LABORATORY ENCOUNTER (OUTPATIENT)
Dept: LAB | Age: 38
End: 2018-10-12
Attending: NEUROLOGICAL SURGERY
Payer: COMMERCIAL

## 2018-10-12 DIAGNOSIS — M54.16 LUMBAR RADICULOPATHY: ICD-10-CM

## 2018-10-12 DIAGNOSIS — R73.9 ELEVATED BLOOD SUGAR: ICD-10-CM

## 2018-10-12 PROCEDURE — 83036 HEMOGLOBIN GLYCOSYLATED A1C: CPT | Performed by: PHYSICIAN ASSISTANT

## 2018-10-12 PROCEDURE — 85025 COMPLETE CBC W/AUTO DIFF WBC: CPT | Performed by: NEUROLOGICAL SURGERY

## 2018-10-12 PROCEDURE — 80053 COMPREHEN METABOLIC PANEL: CPT | Performed by: NEUROLOGICAL SURGERY

## 2018-10-12 PROCEDURE — 85730 THROMBOPLASTIN TIME PARTIAL: CPT | Performed by: NEUROLOGICAL SURGERY

## 2018-10-12 PROCEDURE — 87081 CULTURE SCREEN ONLY: CPT | Performed by: NEUROLOGICAL SURGERY

## 2018-10-12 PROCEDURE — 36415 COLL VENOUS BLD VENIPUNCTURE: CPT | Performed by: NEUROLOGICAL SURGERY

## 2018-10-12 PROCEDURE — 85610 PROTHROMBIN TIME: CPT | Performed by: NEUROLOGICAL SURGERY

## 2018-10-15 RX ORDER — ESCITALOPRAM OXALATE 10 MG/1
10 TABLET ORAL DAILY
Qty: 90 TABLET | Refills: 0 | Status: ON HOLD | OUTPATIENT
Start: 2018-10-15 | End: 2018-10-23

## 2018-10-15 RX ORDER — ESCITALOPRAM OXALATE 5 MG/1
5 TABLET ORAL DAILY
Qty: 90 TABLET | Refills: 0 | Status: ON HOLD | OUTPATIENT
Start: 2018-10-15 | End: 2018-10-23

## 2018-10-15 NOTE — TELEPHONE ENCOUNTER
Medication(s) to Refill:   Requested Prescriptions     Pending Prescriptions Disp Refills   • ESCITALOPRAM 10 MG Oral Tab [Pharmacy Med Name: ESCITALOPRAM 10 MG TABLET] 90 tablet 0     Sig: TAKE 1 TABLET (10 MG TOTAL) BY MOUTH DAILY.  TAKE ALONG WITH 5MG TA

## 2018-10-18 DIAGNOSIS — K59.00 CONSTIPATION, UNSPECIFIED CONSTIPATION TYPE: ICD-10-CM

## 2018-10-19 RX ORDER — LUBIPROSTONE 8 UG/1
CAPSULE, GELATIN COATED ORAL
Qty: 180 CAPSULE | Refills: 0 | Status: ON HOLD | OUTPATIENT
Start: 2018-10-19 | End: 2018-10-23

## 2018-10-19 NOTE — TELEPHONE ENCOUNTER
Medication(s) to Refill:   Requested Prescriptions     Pending Prescriptions Disp Refills   • AMITIZA 8 MCG Oral Cap 180 capsule 0     Sig: TAKE 1 CAPSULE BY MOUTH TWICE DAILY         Reason for Medication Refill being sent to Provider / Reason Protocol Fa

## 2018-10-23 ENCOUNTER — HOSPITAL ENCOUNTER (OUTPATIENT)
Facility: HOSPITAL | Age: 38
Setting detail: OBSERVATION
Discharge: HOME | End: 2018-10-24
Attending: NEUROLOGICAL SURGERY | Admitting: NEUROLOGICAL SURGERY
Payer: COMMERCIAL

## 2018-10-23 ENCOUNTER — APPOINTMENT (OUTPATIENT)
Dept: GENERAL RADIOLOGY | Facility: HOSPITAL | Age: 38
End: 2018-10-23
Attending: NEUROLOGICAL SURGERY
Payer: COMMERCIAL

## 2018-10-23 DIAGNOSIS — M54.16 LUMBAR RADICULOPATHY: Primary | ICD-10-CM

## 2018-10-23 PROCEDURE — 76001 XR FLUOROSCOPE EXAM >1 HR EXTENSIVE (CPT=76001): CPT | Performed by: NEUROLOGICAL SURGERY

## 2018-10-23 PROCEDURE — 99225 SUBSEQUENT OBSERVATION CARE: CPT | Performed by: HOSPITALIST

## 2018-10-23 PROCEDURE — 0SB20ZZ EXCISION OF LUMBAR VERTEBRAL DISC, OPEN APPROACH: ICD-10-PCS | Performed by: NEUROLOGICAL SURGERY

## 2018-10-23 RX ORDER — HYDROCODONE BITARTRATE AND ACETAMINOPHEN 5; 325 MG/1; MG/1
1 TABLET ORAL EVERY 4 HOURS PRN
Status: DISCONTINUED | OUTPATIENT
Start: 2018-10-23 | End: 2018-10-24

## 2018-10-23 RX ORDER — LUBIPROSTONE 8 UG/1
8 CAPSULE, GELATIN COATED ORAL 2 TIMES DAILY WITH MEALS
COMMUNITY
End: 2019-04-24 | Stop reason: ALTCHOICE

## 2018-10-23 RX ORDER — ESCITALOPRAM OXALATE 5 MG/1
TABLET ORAL SEE ADMIN INSTRUCTIONS
COMMUNITY
End: 2020-02-11

## 2018-10-23 RX ORDER — POLYETHYLENE GLYCOL 3350 17 G/17G
17 POWDER, FOR SOLUTION ORAL DAILY PRN
Status: DISCONTINUED | OUTPATIENT
Start: 2018-10-23 | End: 2018-10-24

## 2018-10-23 RX ORDER — SODIUM PHOSPHATE, DIBASIC AND SODIUM PHOSPHATE, MONOBASIC 7; 19 G/133ML; G/133ML
1 ENEMA RECTAL ONCE AS NEEDED
Status: DISCONTINUED | OUTPATIENT
Start: 2018-10-23 | End: 2018-10-24

## 2018-10-23 RX ORDER — METOCLOPRAMIDE HYDROCHLORIDE 5 MG/ML
10 INJECTION INTRAMUSCULAR; INTRAVENOUS EVERY 6 HOURS PRN
Status: DISCONTINUED | OUTPATIENT
Start: 2018-10-23 | End: 2018-10-24

## 2018-10-23 RX ORDER — ONDANSETRON 2 MG/ML
4 INJECTION INTRAMUSCULAR; INTRAVENOUS EVERY 4 HOURS PRN
Status: ACTIVE | OUTPATIENT
Start: 2018-10-23 | End: 2018-10-24

## 2018-10-23 RX ORDER — DEXAMETHASONE SODIUM PHOSPHATE 4 MG/ML
4 VIAL (ML) INJECTION AS NEEDED
Status: DISCONTINUED | OUTPATIENT
Start: 2018-10-23 | End: 2018-10-23 | Stop reason: HOSPADM

## 2018-10-23 RX ORDER — HYDROCODONE BITARTRATE AND ACETAMINOPHEN 5; 325 MG/1; MG/1
1 TABLET ORAL AS NEEDED
Status: DISCONTINUED | OUTPATIENT
Start: 2018-10-23 | End: 2018-10-23 | Stop reason: HOSPADM

## 2018-10-23 RX ORDER — DOCUSATE SODIUM 100 MG/1
100 CAPSULE, LIQUID FILLED ORAL 2 TIMES DAILY
Status: DISCONTINUED | OUTPATIENT
Start: 2018-10-23 | End: 2018-10-24

## 2018-10-23 RX ORDER — ACETAMINOPHEN 500 MG
1000 TABLET ORAL ONCE
Status: DISCONTINUED | OUTPATIENT
Start: 2018-10-23 | End: 2018-10-24

## 2018-10-23 RX ORDER — SODIUM CHLORIDE, SODIUM LACTATE, POTASSIUM CHLORIDE, CALCIUM CHLORIDE 600; 310; 30; 20 MG/100ML; MG/100ML; MG/100ML; MG/100ML
INJECTION, SOLUTION INTRAVENOUS CONTINUOUS
Status: DISCONTINUED | OUTPATIENT
Start: 2018-10-23 | End: 2018-10-24

## 2018-10-23 RX ORDER — LUBIPROSTONE 8 UG/1
8 CAPSULE, GELATIN COATED ORAL 2 TIMES DAILY WITH MEALS
Status: DISCONTINUED | OUTPATIENT
Start: 2018-10-23 | End: 2018-10-24

## 2018-10-23 RX ORDER — SENNOSIDES 8.6 MG
17.2 TABLET ORAL NIGHTLY
Status: DISCONTINUED | OUTPATIENT
Start: 2018-10-23 | End: 2018-10-24

## 2018-10-23 RX ORDER — SODIUM CHLORIDE 9 MG/ML
INJECTION, SOLUTION INTRAVENOUS CONTINUOUS
Status: DISCONTINUED | OUTPATIENT
Start: 2018-10-23 | End: 2018-10-24

## 2018-10-23 RX ORDER — MORPHINE SULFATE 4 MG/ML
2 INJECTION, SOLUTION INTRAMUSCULAR; INTRAVENOUS EVERY 2 HOUR PRN
Status: DISCONTINUED | OUTPATIENT
Start: 2018-10-23 | End: 2018-10-24

## 2018-10-23 RX ORDER — ESCITALOPRAM OXALATE 5 MG/1
5 TABLET ORAL EVERY MORNING
Status: DISCONTINUED | OUTPATIENT
Start: 2018-10-24 | End: 2018-10-24

## 2018-10-23 RX ORDER — ESCITALOPRAM OXALATE 10 MG/1
10 TABLET ORAL EVERY MORNING
Status: DISCONTINUED | OUTPATIENT
Start: 2018-10-24 | End: 2018-10-24

## 2018-10-23 RX ORDER — MORPHINE SULFATE 4 MG/ML
2 INJECTION, SOLUTION INTRAMUSCULAR; INTRAVENOUS EVERY 5 MIN PRN
Status: DISCONTINUED | OUTPATIENT
Start: 2018-10-23 | End: 2018-10-23 | Stop reason: HOSPADM

## 2018-10-23 RX ORDER — SODIUM CHLORIDE, SODIUM LACTATE, POTASSIUM CHLORIDE, CALCIUM CHLORIDE 600; 310; 30; 20 MG/100ML; MG/100ML; MG/100ML; MG/100ML
INJECTION, SOLUTION INTRAVENOUS CONTINUOUS
Status: DISCONTINUED | OUTPATIENT
Start: 2018-10-23 | End: 2018-10-23 | Stop reason: HOSPADM

## 2018-10-23 RX ORDER — CEFAZOLIN SODIUM/WATER 2 G/20 ML
2 SYRINGE (ML) INTRAVENOUS EVERY 8 HOURS
Status: COMPLETED | OUTPATIENT
Start: 2018-10-23 | End: 2018-10-24

## 2018-10-23 RX ORDER — DIPHENHYDRAMINE HYDROCHLORIDE 50 MG/ML
25 INJECTION INTRAMUSCULAR; INTRAVENOUS EVERY 4 HOURS PRN
Status: DISCONTINUED | OUTPATIENT
Start: 2018-10-23 | End: 2018-10-24

## 2018-10-23 RX ORDER — ZOLPIDEM TARTRATE 5 MG/1
5 TABLET ORAL NIGHTLY PRN
Status: DISCONTINUED | OUTPATIENT
Start: 2018-10-23 | End: 2018-10-24

## 2018-10-23 RX ORDER — HYDROCODONE BITARTRATE AND ACETAMINOPHEN 5; 325 MG/1; MG/1
2 TABLET ORAL AS NEEDED
Status: DISCONTINUED | OUTPATIENT
Start: 2018-10-23 | End: 2018-10-23 | Stop reason: HOSPADM

## 2018-10-23 RX ORDER — CEFAZOLIN SODIUM/WATER 2 G/20 ML
2 SYRINGE (ML) INTRAVENOUS ONCE
Status: DISCONTINUED | OUTPATIENT
Start: 2018-10-23 | End: 2018-10-24 | Stop reason: ALTCHOICE

## 2018-10-23 RX ORDER — ONDANSETRON 2 MG/ML
4 INJECTION INTRAMUSCULAR; INTRAVENOUS AS NEEDED
Status: DISCONTINUED | OUTPATIENT
Start: 2018-10-23 | End: 2018-10-23 | Stop reason: HOSPADM

## 2018-10-23 RX ORDER — BUPIVACAINE HYDROCHLORIDE AND EPINEPHRINE 5; 5 MG/ML; UG/ML
INJECTION, SOLUTION EPIDURAL; INTRACAUDAL; PERINEURAL AS NEEDED
Status: DISCONTINUED | OUTPATIENT
Start: 2018-10-23 | End: 2018-10-23 | Stop reason: HOSPADM

## 2018-10-23 RX ORDER — NALOXONE HYDROCHLORIDE 0.4 MG/ML
80 INJECTION, SOLUTION INTRAMUSCULAR; INTRAVENOUS; SUBCUTANEOUS AS NEEDED
Status: DISCONTINUED | OUTPATIENT
Start: 2018-10-23 | End: 2018-10-23 | Stop reason: HOSPADM

## 2018-10-23 RX ORDER — MORPHINE SULFATE 4 MG/ML
4 INJECTION, SOLUTION INTRAMUSCULAR; INTRAVENOUS EVERY 2 HOUR PRN
Status: DISCONTINUED | OUTPATIENT
Start: 2018-10-23 | End: 2018-10-24

## 2018-10-23 RX ORDER — MORPHINE SULFATE 4 MG/ML
1 INJECTION, SOLUTION INTRAMUSCULAR; INTRAVENOUS EVERY 2 HOUR PRN
Status: DISCONTINUED | OUTPATIENT
Start: 2018-10-23 | End: 2018-10-24

## 2018-10-23 RX ORDER — METOCLOPRAMIDE HYDROCHLORIDE 5 MG/ML
10 INJECTION INTRAMUSCULAR; INTRAVENOUS AS NEEDED
Status: DISCONTINUED | OUTPATIENT
Start: 2018-10-23 | End: 2018-10-23 | Stop reason: HOSPADM

## 2018-10-23 RX ORDER — HYDROCODONE BITARTRATE AND ACETAMINOPHEN 5; 325 MG/1; MG/1
2 TABLET ORAL EVERY 4 HOURS PRN
Status: DISCONTINUED | OUTPATIENT
Start: 2018-10-23 | End: 2018-10-24

## 2018-10-23 RX ORDER — DIPHENHYDRAMINE HCL 25 MG
25 CAPSULE ORAL EVERY 4 HOURS PRN
Status: DISCONTINUED | OUTPATIENT
Start: 2018-10-23 | End: 2018-10-24

## 2018-10-23 RX ORDER — BACITRACIN 50000 [USP'U]/1
INJECTION, POWDER, LYOPHILIZED, FOR SOLUTION INTRAMUSCULAR AS NEEDED
Status: DISCONTINUED | OUTPATIENT
Start: 2018-10-23 | End: 2018-10-23 | Stop reason: HOSPADM

## 2018-10-23 RX ORDER — ALPRAZOLAM 0.25 MG/1
0.25 TABLET ORAL NIGHTLY PRN
Status: DISCONTINUED | OUTPATIENT
Start: 2018-10-23 | End: 2018-10-24

## 2018-10-23 RX ORDER — ACETAMINOPHEN 325 MG/1
650 TABLET ORAL EVERY 4 HOURS PRN
Status: DISCONTINUED | OUTPATIENT
Start: 2018-10-23 | End: 2018-10-24

## 2018-10-23 RX ORDER — BISACODYL 10 MG
10 SUPPOSITORY, RECTAL RECTAL
Status: DISCONTINUED | OUTPATIENT
Start: 2018-10-23 | End: 2018-10-24

## 2018-10-23 RX ORDER — CYCLOBENZAPRINE HCL 10 MG
10 TABLET ORAL 3 TIMES DAILY PRN
Status: DISCONTINUED | OUTPATIENT
Start: 2018-10-23 | End: 2018-10-24

## 2018-10-23 RX ORDER — DIAZEPAM 5 MG/1
5 TABLET ORAL EVERY 8 HOURS PRN
Status: DISCONTINUED | OUTPATIENT
Start: 2018-10-23 | End: 2018-10-24

## 2018-10-23 RX ORDER — ESCITALOPRAM OXALATE 10 MG/1
TABLET ORAL SEE ADMIN INSTRUCTIONS
COMMUNITY
End: 2019-04-24 | Stop reason: ALTCHOICE

## 2018-10-23 NOTE — OPERATIVE REPORT
[facility]    OPERATIVE REPORT    Patient:  Dakotah Jose;  YOB: 1980     CSN:  [id number]; Medical Record Number:  [id number]    Admission Date:  10/23/2018 Operation Date:  10/23/2018    . ..........................     Operating Phys hook was used to carefully displace the axilla of the nerve root caudal and medial by a few millimeters, revealing the frayed end of a disc fragment.  The fragment was grasped with a pituitary rongeur and with gentle traction a large disc fragment was remov

## 2018-10-23 NOTE — BRIEF OP NOTE
Pre-Operative Diagnosis: Lumbar radiculopathy [M54.16]     Post-Operative Diagnosis: Lumbar radiculopathy [M54.16]      Procedure Performed:   Procedure(s):  left lumbar three-four discectomy    Surgeon(s) and Role:     Emilie Peterson MD - Primary    Ass

## 2018-10-23 NOTE — PLAN OF CARE
Patient/Family Goals    • Patient/Family Long Term Goal Progressing    • Patient/Family Short Term Goal Progressing        S/p L 3-4 microdisectomy. Arrived to floor from PACU. Pain moderate upon arrival. Will try norco when pt has eaten.  Placed on regular

## 2018-10-23 NOTE — CONSULTS
[facility]  [address] [place] Patient Status:  Outpatient in a Bed    1980 MRN [id number]   [facility] 3SW-A Attending Ab Chand MD   Hosp Day # 0 PCP Ingrid Villatoro MD     Reason for consult: Medical Management Shivani Barclay MD at [facility] MAIN OR   • TRANSFORAMINAL LUMBAR EPIDURAL STEROID INJECTION SINGLE LEVEL Left 7/18/2018    Performed by Braxton Hannah MD at [facility] MAIN OR       Social History:  reports that  has never smoked.  she has never used smokeless tobacco. She reports positives and negatives noted in the HPI.     Physical Exam:    /75 (BP Location: Right arm)   Pulse 79   Temp 98 °F (36.7 °C) (Oral)   Resp 19   Ht 166.4 cm (5' 5.5\")   Wt 184 lb 4.9 oz (83.6 kg)   LMP 06/04/2014   SpO2 97%   BMI 30.20 kg/m²   Gener

## 2018-10-23 NOTE — PLAN OF CARE
Dr. Ortega Peng called to clarify \"apply brace\" order. Pt did not have brace upon arrival to floor. Dr. Ortega Peng does NOT want brace, order d/c'ed.

## 2018-10-23 NOTE — H&P
Neurosurgery Pre-OP H&P  10/23/2018    Raffaele Bowser PCP:  Margaret Hernandes MD    1980 MRN [id number]       CC:  Left Leg Pain      HISTORY OF PRESENT ILLNESS:  Raffaele Bowser is a(n) 34year old female here for follow-up of LBP and LLE pain.   She OR  8/15/2018: TRANSFORAMINAL LUMBAR EPIDURAL STEROID INJECTION SINGLE   LEVEL; Left      Comment:  Performed by Kvng Whitehead MD at [facility] MAIN OR  8/1/2018: TRANSFORAMINAL LUMBAR EPIDURAL STEROID INJECTION SINGLE   LEVEL;  Left      Comment:  Performed by Juan J Sterling extruded fragments into the foramen and inferior to the level of the bottom of the L4 pedicle with displacement of the traversing nerve roots.      ASSESSMENT:  33 yo F with lumbar radiculopathy due to L3-4 disc herniation, refractory to medical management

## 2018-10-24 ENCOUNTER — TELEPHONE (OUTPATIENT)
Dept: SURGERY | Facility: CLINIC | Age: 38
End: 2018-10-24

## 2018-10-24 VITALS
TEMPERATURE: 98 F | BODY MASS INDEX: 30.34 KG/M2 | OXYGEN SATURATION: 96 % | SYSTOLIC BLOOD PRESSURE: 128 MMHG | HEART RATE: 57 BPM | DIASTOLIC BLOOD PRESSURE: 88 MMHG | WEIGHT: 184.31 LBS | HEIGHT: 65.5 IN | RESPIRATION RATE: 18 BRPM

## 2018-10-24 RX ORDER — CYCLOBENZAPRINE HCL 10 MG
10 TABLET ORAL 3 TIMES DAILY PRN
Qty: 60 TABLET | Refills: 0 | Status: SHIPPED | OUTPATIENT
Start: 2018-10-24 | End: 2018-12-14

## 2018-10-24 RX ORDER — HYDROCODONE BITARTRATE AND ACETAMINOPHEN 5; 325 MG/1; MG/1
1-2 TABLET ORAL EVERY 6 HOURS PRN
Qty: 45 TABLET | Refills: 0 | Status: SHIPPED | OUTPATIENT
Start: 2018-10-24 | End: 2018-11-27

## 2018-10-24 NOTE — PLAN OF CARE
WILLING TO GO HOME TODAY. PAIN CONTROL REQ SCRIPT FOR FLEXERIL TO TAKE AT HOME. WILL PAGE PA OF DR CHAUHAN.

## 2018-10-24 NOTE — OCCUPATIONAL THERAPY NOTE
OCCUPATIONAL THERAPY QUICK EVALUATION - INPATIENT    Room Number: 373/373-A  Evaluation Date: 10/24/2018     Type of Evaluation: Quick Eval  Presenting Problem: s/p L3-4 discectomy 10/23/18    Physician Order: IP Consult to Occupational Therapy  Reason for STEROID INJECTION SINGLE LEVEL Left 8/15/2018    Performed by Braxton Hannah MD at [address] [facility]   • TRANSFORAMINAL LUMBAR EPIDURAL STEROID INJECTION SINGLE LEVEL Left 8/1/2018    Performed by Braxton Hannah MD at [facility] MAIN OR   • TRANSFORAMINAL LUMBAR EPIDURAL VADIM None  -   Taking care of personal grooming such as brushing teeth?: None  -   Eating meals?: None    AM-PAC Score:  Score: 24  Approx Degree of Impairment: 0%  Standardized Score (AM-PAC Scale): 57.54  CMS Modifier (G-Code): CH    FUNCTIONAL TRANSFER ASSES home from . Patient also with good recall and return demo following spinal precautions. Patient reports no further questions or concerns about safe return to I/ADL tasks. No further OT services needed at this time.        Patient Complexity  Occupati

## 2018-10-24 NOTE — PLAN OF CARE
PAIN - ADULT    • Verbalizes/displays adequate comfort level or patient's stated pain goal Progressing        SAFETY ADULT - FALL    • Free from fall injury Progressing        ALERT ,ORIENTED, PA HERE FOR DR CHAUHAN, AWARE WBC UP TO 17,AFEBRILE.  ENC IS USE

## 2018-10-24 NOTE — CM/SW NOTE
Order received for d/c planning. Pt admitted 10/23 for Lumbar Microdiscectomy. Discussed in d/c rounds with RN- no HHC needs anticipated. PT indicating that pt is okay for d/c home. No d/c planning needs apparent at this time.

## 2018-10-24 NOTE — PHYSICAL THERAPY NOTE
PHYSICAL THERAPY QUICK EVALUATION - INPATIENT    Room Number: 373/373-A  Evaluation Date: 10/24/2018  Presenting Problem: s/p L3/4 discectomy 10/23/18  Physician Order: PT Eval and Treat    Problem List  Active Problems:    Hemochromatosis      Past Medi HOME SITUATION  Type of Home: House   Home Layout: Two level  Stairs to Enter : 2     Stairs to [employer]: (flight)  Railing: Yes    Lives With: Spouse; Family  Drives: Yes  Patient Owned Equipment: None  Patient Regularly Uses: Glasses    Prior Level o Within Functional Limits  Stoop/Curb Assistance: Independent  Comment : 4 stairs with railing ind    Skilled Therapy Provided: Pt ok to see per rn.   Pt recd in bs chair, spouse present, educated in role of PT, goals for session, spine precautions, log roll

## 2018-10-24 NOTE — PROGRESS NOTES
[facility]  Neurosurgery Progress Note    Kylee Andujar Patient Status:  Outpatient in a Bed    1980 MRN [id number]   [facility] 3SW-A Attending Merry Stevens MD   Hosp Day # 0 PCP Darline Mccoy MD     Subjective:  Pt examined,

## 2018-10-25 ENCOUNTER — TELEPHONE (OUTPATIENT)
Dept: SURGERY | Facility: CLINIC | Age: 38
End: 2018-10-25

## 2018-10-26 NOTE — TELEPHONE ENCOUNTER
Spoke with Shaila Hansen with [employer] for patient's STD. Reviewed information. No further questions at this time.

## 2018-10-26 NOTE — TELEPHONE ENCOUNTER
LMTCB. Need to verify mother is helping patient. Need to see what patient's mother will be helping with and for how long.      (Patient had 1 level lumbar microdiscectomy on 10/23/18.)

## 2018-11-02 NOTE — TELEPHONE ENCOUNTER
Spoke with patient who stated her mother has been helping her after surgery. She has been helping with her kids, driving her around and other tasks around the house. Forms initiated and endorsed to .   Patient's mother will also need to sign p

## 2018-11-05 NOTE — TELEPHONE ENCOUNTER
Pt called back, informed her of message below. She requested we fax forms to mother who will sign forms and fax them back to us.   Forms endorsed to front for faxing

## 2018-11-05 NOTE — TELEPHONE ENCOUNTER
fxd completed form to Dinesh Dawkins, mother to sign page 3 & 10 before we can fax the forms. Forms in PSR bins waiting for signed copies.

## 2018-11-05 NOTE — TELEPHONE ENCOUNTER
Form completed and left in nurse bin. LMTCB. Mother needs to sign the forms prior to forms being faxed.

## 2018-11-07 ENCOUNTER — OFFICE VISIT (OUTPATIENT)
Dept: SURGERY | Facility: CLINIC | Age: 38
End: 2018-11-07
Payer: COMMERCIAL

## 2018-11-07 VITALS — SYSTOLIC BLOOD PRESSURE: 138 MMHG | DIASTOLIC BLOOD PRESSURE: 80 MMHG | HEART RATE: 84 BPM

## 2018-11-07 DIAGNOSIS — Z98.890 S/P LUMBAR MICRODISCECTOMY: Primary | ICD-10-CM

## 2018-11-07 PROCEDURE — 99024 POSTOP FOLLOW-UP VISIT: CPT | Performed by: PHYSICIAN ASSISTANT

## 2018-11-07 RX ORDER — CYCLOBENZAPRINE HCL 10 MG
10 TABLET ORAL NIGHTLY PRN
Qty: 30 TABLET | Refills: 0 | Status: SHIPPED | OUTPATIENT
Start: 2018-11-07 | End: 2019-04-24 | Stop reason: ALTCHOICE

## 2018-11-07 NOTE — TELEPHONE ENCOUNTER
rcvd FMLA forms again from [facility]. States question 9 & question 3 are insufficient. Needs forms returned before 11/17/18.  Endorsed to Dr Juan Carlos Wood

## 2018-11-07 NOTE — PROGRESS NOTES
Pt is here for post op rafaela for the lumbar. Pt states that she is doing good post SX.    Pain scale 5/10   SX date 10/23/18

## 2018-11-07 NOTE — PROGRESS NOTES
Neurosurgery Clinic Visit  2018    Kamar Lea PCP:  Jacque Barroso MD    1980 MRN [id number]       CC: S/P Left L3-4 microdiscectomy 10/23/18    HPI:    Monica Ferrer is here for 2 week post op appt. She is doing very well.   Her pre-op leg sympt cervicitis  2013: DILATION/CURETTAGE,DIAGNOSTIC      Comment:  removal benign polyps  10/21/2013: ENDOMETRIAL ABLATION  6-2012: ENDOMETRIAL BX CONJUNCT W/COLPOSCOPY  6/2014: HYSTERECTOMY      Comment:  partial, still has ovaries  10/21/2013: HYSTEROSCOPY E nerves: Pupils equally round and reactive to light. EOMs intact. Face is symmetrical and sensation is intact. Tongue is midline.                   Motor: 5/5 x 4                Sensation: intact to light touch bilaterally                  Reflexes: 1+, n

## 2018-11-07 NOTE — PATIENT INSTRUCTIONS
Refill policies:    • Allow 2-3 business days for refills; controlled substances may take longer.   • Contact your pharmacy at least 5 days prior to running out of medication and have them send an electronic request or submit request through the “request re entire amount billed. Precertification and Prior Authorizations: If your physician has recommended that you have a procedure or additional testing performed.   [facility]) will contact your insurance carrier to obtain pre-certi

## 2018-11-12 ENCOUNTER — TELEPHONE (OUTPATIENT)
Dept: SURGERY | Facility: CLINIC | Age: 38
End: 2018-11-12

## 2018-11-12 NOTE — TELEPHONE ENCOUNTER
STD paperwork brought in to office visit on 11/7/18 has been completed and signed and endorsed to  to fax and send to scan.

## 2018-11-15 NOTE — TELEPHONE ENCOUNTER
rcvd FMLA forms again from [employer]. States forms are incompleted.  Endorsed to Dr Jammie Brown

## 2018-11-19 NOTE — TELEPHONE ENCOUNTER
mother calling, states the [employer] form is incomplete, would like to provide info for \"missing parts\", please call back

## 2018-11-19 NOTE — TELEPHONE ENCOUNTER
Spoke to pt's mother who states [employer] wants provider to change the amount of time pt needs to be off work to 8-12 wks since pt will need follow up appointments.  (Question #3), she also states they want it specified when pt's condition began/when she started

## 2018-11-27 DIAGNOSIS — M54.16 LUMBAR RADICULOPATHY: ICD-10-CM

## 2018-11-27 NOTE — TELEPHONE ENCOUNTER
Medication: Norco 5/325    Date of last refill: 10/24/18 #45  [place] 29/24/39 #21    Last office visit: 11/7/18  Due back to clinic per last office note:  6 weeks  Date next office visit scheduled:  12/19/18    Last OV

## 2018-11-28 RX ORDER — HYDROCODONE BITARTRATE AND ACETAMINOPHEN 5; 325 MG/1; MG/1
1 TABLET ORAL EVERY 8 HOURS PRN
Qty: 30 TABLET | Refills: 0 | Status: SHIPPED | OUTPATIENT
Start: 2018-11-28 | End: 2018-12-14

## 2018-12-14 ENCOUNTER — LAB ENCOUNTER (OUTPATIENT)
Dept: LAB | Age: 38
End: 2018-12-14
Attending: FAMILY MEDICINE
Payer: COMMERCIAL

## 2018-12-14 ENCOUNTER — OFFICE VISIT (OUTPATIENT)
Dept: FAMILY MEDICINE CLINIC | Facility: CLINIC | Age: 38
End: 2018-12-14
Payer: COMMERCIAL

## 2018-12-14 DIAGNOSIS — Z23 NEED FOR VACCINATION: ICD-10-CM

## 2018-12-14 DIAGNOSIS — Z71.3 WEIGHT LOSS COUNSELING, ENCOUNTER FOR: ICD-10-CM

## 2018-12-14 DIAGNOSIS — R53.83 OTHER FATIGUE: Primary | ICD-10-CM

## 2018-12-14 DIAGNOSIS — Z12.31 ENCOUNTER FOR SCREENING MAMMOGRAM FOR MALIGNANT NEOPLASM OF BREAST: ICD-10-CM

## 2018-12-14 DIAGNOSIS — R10.11 RIGHT UPPER QUADRANT PAIN: ICD-10-CM

## 2018-12-14 DIAGNOSIS — R53.83 OTHER FATIGUE: ICD-10-CM

## 2018-12-14 DIAGNOSIS — F41.1 GAD (GENERALIZED ANXIETY DISORDER): ICD-10-CM

## 2018-12-14 PROCEDURE — 82306 VITAMIN D 25 HYDROXY: CPT | Performed by: FAMILY MEDICINE

## 2018-12-14 PROCEDURE — 84443 ASSAY THYROID STIM HORMONE: CPT | Performed by: FAMILY MEDICINE

## 2018-12-14 PROCEDURE — 36415 COLL VENOUS BLD VENIPUNCTURE: CPT | Performed by: FAMILY MEDICINE

## 2018-12-14 PROCEDURE — 82746 ASSAY OF FOLIC ACID SERUM: CPT | Performed by: FAMILY MEDICINE

## 2018-12-14 PROCEDURE — 90471 IMMUNIZATION ADMIN: CPT | Performed by: FAMILY MEDICINE

## 2018-12-14 PROCEDURE — 82607 VITAMIN B-12: CPT | Performed by: FAMILY MEDICINE

## 2018-12-14 PROCEDURE — 99214 OFFICE O/P EST MOD 30 MIN: CPT | Performed by: FAMILY MEDICINE

## 2018-12-14 PROCEDURE — 84439 ASSAY OF FREE THYROXINE: CPT | Performed by: FAMILY MEDICINE

## 2018-12-14 PROCEDURE — 90686 IIV4 VACC NO PRSV 0.5 ML IM: CPT | Performed by: FAMILY MEDICINE

## 2018-12-14 RX ORDER — PHENTERMINE HYDROCHLORIDE 15 MG/1
15 CAPSULE ORAL EVERY MORNING
Qty: 30 CAPSULE | Refills: 0 | Status: SHIPPED
Start: 2018-12-14 | End: 2019-01-13

## 2018-12-15 VITALS
HEIGHT: 65 IN | RESPIRATION RATE: 16 BRPM | SYSTOLIC BLOOD PRESSURE: 138 MMHG | BODY MASS INDEX: 32.65 KG/M2 | WEIGHT: 196 LBS | TEMPERATURE: 99 F | DIASTOLIC BLOOD PRESSURE: 88 MMHG | HEART RATE: 82 BPM

## 2018-12-15 NOTE — PROGRESS NOTES
Chief Complaint:   Patient presents with:  Medication Follow-Up    HPI:   This is a 34year old female presenting for follow-up.   Patient has a history of generalized anxiety and depression currently under control with Lexapro 15 mg daily does take [place] N/A 10/21/2013    Performed by Santosh Reveles MD at [address] [place] [address] OR   • LUMBAR MICRODISCECTOMY 1 LEVEL Left 10/23/2018    Performed by Leland Chau MD at [address] [place] [address] OR   • OTHER  10/23/2018    left lumbar three-four discectomy   • ROBOT-ASSISTED LAPAROSCOPIC mg total) by mouth nightly as needed. Disp: 30 tablet Rfl: 2   ALPRAZolam 0.25 MG Oral Tab Take 1 tablet (0.25 mg total) by mouth nightly as needed for Sleep.  Disp: 20 tablet Rfl: 2   Ketoconazole 2 % External Shampoo Apply to the scalp few times weekly Perez Marques (Oral)   Resp 16   Ht 65\"   Wt 196 lb   LMP 06/04/2014   BMI 32.62 kg/m²  Estimated body mass index is 32.62 kg/m² as calculated from the following:    Height as of this encounter: 65\". Weight as of this encounter: 196 lb. Vital signs reviewed. Appea motor deficit. Coordination and gait normal.   Skin: Skin is warm and dry. No lesion and no rash noted. No erythema. No pallor. Does not exhibit alopecia  Psychiatric: She has a normal mood and affect.  Her behavior is normal. Judgment and thought content n

## 2018-12-17 ENCOUNTER — TELEPHONE (OUTPATIENT)
Dept: FAMILY MEDICINE CLINIC | Facility: CLINIC | Age: 38
End: 2018-12-17

## 2018-12-17 DIAGNOSIS — Z13.29 SCREENING FOR ENDOCRINE, NUTRITIONAL, METABOLIC AND IMMUNITY DISORDER: ICD-10-CM

## 2018-12-17 DIAGNOSIS — E55.9 VITAMIN D DEFICIENCY: Primary | ICD-10-CM

## 2018-12-17 DIAGNOSIS — Z13.228 SCREENING FOR ENDOCRINE, NUTRITIONAL, METABOLIC AND IMMUNITY DISORDER: ICD-10-CM

## 2018-12-17 DIAGNOSIS — Z13.21 SCREENING FOR ENDOCRINE, NUTRITIONAL, METABOLIC AND IMMUNITY DISORDER: ICD-10-CM

## 2018-12-17 DIAGNOSIS — Z13.0 SCREENING FOR ENDOCRINE, NUTRITIONAL, METABOLIC AND IMMUNITY DISORDER: ICD-10-CM

## 2018-12-17 DIAGNOSIS — R53.83 OTHER FATIGUE: ICD-10-CM

## 2018-12-17 NOTE — TELEPHONE ENCOUNTER
----- Message from Ming Eugene MD sent at 12/17/2018 10:05 AM CST -----  Stable, labs, recheck in 6 months.

## 2018-12-18 ENCOUNTER — TELEPHONE (OUTPATIENT)
Dept: FAMILY MEDICINE CLINIC | Facility: CLINIC | Age: 38
End: 2018-12-18

## 2018-12-18 RX ORDER — ERGOCALCIFEROL 1.25 MG/1
50000 CAPSULE ORAL WEEKLY
Qty: 4 CAPSULE | Refills: 3 | Status: SHIPPED | OUTPATIENT
Start: 2018-12-18 | End: 2019-04-09

## 2018-12-18 NOTE — TELEPHONE ENCOUNTER
----- Message from Antonietta Hopkins MD sent at 12/17/2018 10:41 PM CST -----  Low vitamin D, needs vitamin D 50,000 units q weekly #4 with 3 refills then, 5000 units q daily maintenance   Repeat Vitamin D in 6-12 months.

## 2018-12-19 ENCOUNTER — OFFICE VISIT (OUTPATIENT)
Dept: SURGERY | Facility: CLINIC | Age: 38
End: 2018-12-19
Payer: COMMERCIAL

## 2018-12-19 VITALS — SYSTOLIC BLOOD PRESSURE: 124 MMHG | HEART RATE: 80 BPM | DIASTOLIC BLOOD PRESSURE: 76 MMHG

## 2018-12-19 DIAGNOSIS — M54.16 LUMBAR RADICULOPATHY: Primary | ICD-10-CM

## 2018-12-19 PROCEDURE — 99024 POSTOP FOLLOW-UP VISIT: CPT | Performed by: NEUROLOGICAL SURGERY

## 2018-12-19 NOTE — PROGRESS NOTES
[facility]  Neurosurgery Clinic Visit      HISTORY OF PRESENT ILLNESS:  Charles Habermann is a(n) 34year old female s/p 10/23/18 left L3-4 discectomy.  Doing well, her only pain is occasional crampy pain in the left lower back or upper b LEVEL Left 8/1/2018    Performed by Yao Nicole MD at [address]   • TRANSFORAMINAL LUMBAR EPIDURAL STEROID INJECTION SINGLE LEVEL Left 7/18/2018    Performed by Yao Nicole MD at [facility] MAIN OR       FAMILY HISTORY:  family history includes Breast Cancer (a

## 2018-12-19 NOTE — PROGRESS NOTES
Pt is here for post op appointment for the lumbar. Pt hasnt started PT due to pain. Pt states that she is doing very well.      Pain scale: 2/10

## 2019-01-03 DIAGNOSIS — G47.09 OTHER INSOMNIA: ICD-10-CM

## 2019-01-03 NOTE — TELEPHONE ENCOUNTER
Medication(s) to Refill:   Requested Prescriptions     Pending Prescriptions Disp Refills   • ALPRAZOLAM 0.25 MG Oral Tab [Pharmacy Med Name: ALPRAZOLAM 0.25 MG TABLET] 20 tablet 0     Sig: TAKE 1 TABLET BY MOUTH NIGHTLY AS NEEDED FOR SLEEP         Reason

## 2019-01-04 RX ORDER — ALPRAZOLAM 0.25 MG/1
TABLET ORAL
Qty: 20 TABLET | Refills: 2 | Status: SHIPPED
Start: 2019-01-04 | End: 2019-08-10

## 2019-01-13 ENCOUNTER — PATIENT MESSAGE (OUTPATIENT)
Dept: FAMILY MEDICINE CLINIC | Facility: CLINIC | Age: 39
End: 2019-01-13

## 2019-01-13 DIAGNOSIS — G47.09 OTHER INSOMNIA: ICD-10-CM

## 2019-01-14 RX ORDER — PHENTERMINE HYDROCHLORIDE 15 MG/1
15 CAPSULE ORAL EVERY MORNING
Qty: 30 CAPSULE | Refills: 0 | Status: SHIPPED
Start: 2019-01-14 | End: 2019-04-24 | Stop reason: ALTCHOICE

## 2019-01-14 RX ORDER — ESZOPICLONE 2 MG/1
2 TABLET, FILM COATED ORAL NIGHTLY PRN
Qty: 30 TABLET | Refills: 2 | Status: SHIPPED
Start: 2019-01-14 | End: 2019-08-10

## 2019-01-14 NOTE — TELEPHONE ENCOUNTER
Medication(s) to Refill:   Requested Prescriptions     Pending Prescriptions Disp Refills   • Eszopiclone (LUNESTA) 2 MG Oral Tab 30 tablet 2     Sig: Take 1 tablet (2 mg total) by mouth nightly as needed.    • Phentermine HCl 15 MG Oral Cap 30 capsule 0

## 2019-01-14 NOTE — TELEPHONE ENCOUNTER
From: Hannah Bowden  To: Soha Monet MD  Sent: 1/13/2019 5:44 PM CST  Subject: Prescription Question    Hi Dr. Chapis Griffin-  I am writing to let you know that I have lost 10lbs over the past 4 weeks of using the phentermine.  I am hoping to lose more over

## 2019-01-23 ENCOUNTER — OFFICE VISIT (OUTPATIENT)
Dept: PHYSICAL THERAPY | Age: 39
End: 2019-01-23
Attending: PHYSICIAN ASSISTANT
Payer: COMMERCIAL

## 2019-01-23 DIAGNOSIS — Z98.890 S/P LUMBAR MICRODISCECTOMY: ICD-10-CM

## 2019-01-23 PROCEDURE — 97161 PT EVAL LOW COMPLEX 20 MIN: CPT | Performed by: PHYSICAL THERAPIST

## 2019-01-23 PROCEDURE — 97110 THERAPEUTIC EXERCISES: CPT | Performed by: PHYSICAL THERAPIST

## 2019-01-23 NOTE — PROGRESS NOTES
SPINE EVALUATION:   Referring Physician: Dr. Nabila Lindsey  Diagnosis: Microdisectomy lumbar spine L3-L4 on 10/23/2018      Date of Service: 1/23/2019     PATIENT SUMMARY   Kiley Thomas is a 34year old y/o female who presents to therapy today with complain motion: normal central PA glide lumbar spine  Palpation: tenderness along incision site, L SI jt and L glut along lat sacral border    Strength:   LE   Hip flexion: R 5/5; L 5/5  Hip abduction: R 5/5; L 4+/5  Hip Extension: R 4+/5; L 4+/5   Hip ER: R 5/5; Manual Therapy; Therapeutic Exercises; Neuromuscular Re-education; Therapeutic Activity;  Electrical Stim; Mechanical Traction; Pt education; Home exercise program instruction;     Education or treatment limitation: None  Rehab Potential:good    FOTO: 65/10

## 2019-01-28 ENCOUNTER — APPOINTMENT (OUTPATIENT)
Dept: PHYSICAL THERAPY | Age: 39
End: 2019-01-28
Attending: PHYSICIAN ASSISTANT
Payer: COMMERCIAL

## 2019-01-29 ENCOUNTER — OFFICE VISIT (OUTPATIENT)
Dept: PHYSICAL THERAPY | Age: 39
End: 2019-01-29
Attending: PHYSICIAN ASSISTANT
Payer: COMMERCIAL

## 2019-01-29 PROCEDURE — 97110 THERAPEUTIC EXERCISES: CPT | Performed by: PHYSICAL THERAPIST

## 2019-01-29 PROCEDURE — 97140 MANUAL THERAPY 1/> REGIONS: CPT | Performed by: PHYSICAL THERAPIST

## 2019-01-30 ENCOUNTER — APPOINTMENT (OUTPATIENT)
Dept: PHYSICAL THERAPY | Age: 39
End: 2019-01-30
Attending: PHYSICIAN ASSISTANT
Payer: COMMERCIAL

## 2019-01-31 ENCOUNTER — OFFICE VISIT (OUTPATIENT)
Dept: PHYSICAL THERAPY | Age: 39
End: 2019-01-31
Attending: PHYSICIAN ASSISTANT
Payer: COMMERCIAL

## 2019-01-31 PROCEDURE — 97110 THERAPEUTIC EXERCISES: CPT | Performed by: PHYSICAL THERAPIST

## 2019-01-31 NOTE — PROGRESS NOTES
Dx: Microdisectomy lumbar spine L3-L4 on 10/23/2018              Authorized # of Visits:  n/a         Next MD visit: none scheduled  Fall Risk: standard         Precautions: n/a             Subjective: Pt states she slipped while walking and caught herself with laser 2 X 10 ea Free Motion Cable Column resisted walking 20# X 10 F, B, R, L        Piriformis stretch 30 sec X 3 Piriformis stretch 30 sec X 3         DKTC 10 sec X 5        MAN THERE 10 MINS         STM to lumbar paraspinals and incision X 10 mins

## 2019-01-31 NOTE — PROGRESS NOTES
Dx: Microdisectomy lumbar spine L3-L4 on 10/23/2018              Authorized # of Visits:  n/a         Next MD visit: none scheduled  Fall Risk: standard         Precautions: n/a             Subjective: Pt reports improvement in low back flexibility with ex incision X 10 mins          MINS                                          Charges:  There ex X 2, man there X 1       Total Timed Treatment: 45 min  Total Treatment Time: 45 min

## 2019-02-04 ENCOUNTER — APPOINTMENT (OUTPATIENT)
Dept: PHYSICAL THERAPY | Age: 39
End: 2019-02-04
Attending: PHYSICIAN ASSISTANT
Payer: COMMERCIAL

## 2019-02-04 RX ORDER — ESCITALOPRAM OXALATE 5 MG/1
5 TABLET ORAL DAILY
Qty: 90 TABLET | Refills: 0 | Status: SHIPPED | OUTPATIENT
Start: 2019-02-04 | End: 2019-04-23

## 2019-02-04 RX ORDER — ESCITALOPRAM OXALATE 10 MG/1
10 TABLET ORAL DAILY
Qty: 90 TABLET | Refills: 0 | Status: SHIPPED | OUTPATIENT
Start: 2019-02-04 | End: 2019-04-23

## 2019-02-06 ENCOUNTER — OFFICE VISIT (OUTPATIENT)
Dept: PHYSICAL THERAPY | Age: 39
End: 2019-02-06
Attending: PHYSICIAN ASSISTANT
Payer: COMMERCIAL

## 2019-02-06 PROCEDURE — 97110 THERAPEUTIC EXERCISES: CPT | Performed by: PHYSICAL THERAPIST

## 2019-02-06 NOTE — PROGRESS NOTES
Dx: Microdisectomy lumbar spine L3-L4 on 10/23/2018              Authorized # of Visits:  n/a         Next MD visit: none scheduled  Fall Risk: standard         Precautions: n/a             Subjective: Pt reports feeling some aching in low back at 4/10 tod Seated SB alt leg lifts X 20       TA bracing 5 sec X 20 Seated SB leg ext X 20 SB supine walkout X 5       Knee fall out with TA brace with laser 2 X 10 ea Free Motion Cable Column resisted walking 20# X 10 F, B, R, L Free Motion Cable Column resisted wal

## 2019-02-11 ENCOUNTER — OFFICE VISIT (OUTPATIENT)
Dept: PHYSICAL THERAPY | Age: 39
End: 2019-02-11
Attending: PHYSICIAN ASSISTANT
Payer: COMMERCIAL

## 2019-02-11 PROCEDURE — 97110 THERAPEUTIC EXERCISES: CPT | Performed by: PHYSICAL THERAPIST

## 2019-02-11 PROCEDURE — 97140 MANUAL THERAPY 1/> REGIONS: CPT | Performed by: PHYSICAL THERAPIST

## 2019-02-11 NOTE — PROGRESS NOTES
Dx: Microdisectomy lumbar spine L3-L4 on 10/23/2018              Authorized # of Visits:  n/a         Next MD visit: none scheduled  Fall Risk: standard         Precautions: n/a             Subjective: Pt states she was good for 2 days after last session. board stretch 30 sec X 3 Slant board stretch 30 sec X 3 Slant board stretch 30 sec X 3      Supine pelvic tilts X 20 Seated pelvi tilts on SB F-B, S-S, circles X 20 Seated pelvi tilts on SB F-B, S-S, circles X 20 Dynamic HS stretch in neutral, IR, ER 5 sec

## 2019-02-13 ENCOUNTER — OFFICE VISIT (OUTPATIENT)
Dept: PHYSICAL THERAPY | Age: 39
End: 2019-02-13
Attending: PHYSICIAN ASSISTANT
Payer: COMMERCIAL

## 2019-02-13 PROCEDURE — 97110 THERAPEUTIC EXERCISES: CPT | Performed by: PHYSICAL THERAPIST

## 2019-02-14 NOTE — PROGRESS NOTES
Dx: Microdisectomy lumbar spine L3-L4 on 10/23/2018              Authorized # of Visits:  n/a         Next MD visit: none scheduled  Fall Risk: standard         Precautions: n/a             Subjective: Pt states she felt good after last session and does no Recumbent bike x 5 mins level 4 Recumbent bike x 5 mins level 4     Slant board stretch 30 sec X 3 Slant board stretch 30 sec X 3 Slant board stretch 30 sec X 3 Slant board stretch 30 sec X 3 Slant board stretch 30 sec X 3     Supine pelvic tilts X 20 Seat

## 2019-02-15 ENCOUNTER — PATIENT MESSAGE (OUTPATIENT)
Dept: FAMILY MEDICINE CLINIC | Facility: CLINIC | Age: 39
End: 2019-02-15

## 2019-02-15 DIAGNOSIS — Z82.49 FAMILY HISTORY OF FIBROMUSCULAR DYSPLASIA: Primary | ICD-10-CM

## 2019-02-15 DIAGNOSIS — Z13.79 GENETIC SCREENING: ICD-10-CM

## 2019-02-15 RX ORDER — PHENTERMINE HYDROCHLORIDE 30 MG/1
30 CAPSULE ORAL EVERY MORNING
Qty: 30 CAPSULE | Refills: 0 | Status: SHIPPED
Start: 2019-02-15 | End: 2019-03-18

## 2019-02-15 NOTE — TELEPHONE ENCOUNTER
Pt is requesting if her Phentermine can be increased from 15mg to 30mg. She states that her results have slowed down and that her BPs have been stable. LOV: 12/14/18. LF: 1/14/19. Okay to increase Phentermine to 30mg? Pended rx.  Pls approve or d

## 2019-02-15 NOTE — TELEPHONE ENCOUNTER
From: Vianca Branham  To: Nettie Hook MD  Sent: 2/15/2019 10:59 AM CST  Subject: Prescription Question    Hi Dr. Bel Vazquez,    I am due for a refill on my phentermine and am wondering if it can be bumped up to the 30mg?  I had great results the first few

## 2019-02-15 NOTE — TELEPHONE ENCOUNTER
Please see message from pt and advise. Pt's fraternal twin sister has Fibromuscular Dysplasia and small aneurysm in neck. Pt wants to know if she should be concerned or if she needs to do additional screening. Please advise.

## 2019-02-15 NOTE — TELEPHONE ENCOUNTER
From: Dakotah Jose  To: Nabil Huffman MD  Sent: 2/15/2019 2:05 PM CST  Subject: Non-Urgent Mick Staff Dr. Bill Borges,    My twin sister (fraternal) was recently diagnosed with Fibromuscular Dysplasia, which they told her was genetic.  She has

## 2019-02-18 ENCOUNTER — OFFICE VISIT (OUTPATIENT)
Dept: PHYSICAL THERAPY | Age: 39
End: 2019-02-18
Attending: PHYSICIAN ASSISTANT
Payer: COMMERCIAL

## 2019-02-18 PROCEDURE — 97110 THERAPEUTIC EXERCISES: CPT | Performed by: PHYSICAL THERAPIST

## 2019-02-18 NOTE — PROGRESS NOTES
Dx: Microdisectomy lumbar spine L3-L4 on 10/23/2018              Authorized # of Visits:  n/a         Next MD visit: none scheduled  Fall Risk: standard         Precautions: n/a             Subjective: Pt states she felt ok until the weekend when she start THERE EX 45  MINS THERE EX 45  MINS THERE EX 35  MINS THERE EX 45  MINS THERE EX 45  MINS    Recumbent bike x 5 mins Recumbent bike x 5 mins Recumbent bike x 5 mins level 4 Recumbent bike x 5 mins level 4 Recumbent bike x 5 mins level 4 Recumbent bike x 5 with mirror YTB 2 X 10     MINS    Seated piriformis stretch 30 sec X 3 Standing hip ext with mirror YTB 2 X 10         Shuttle una leg press 6 bands2 X 20                            Charges:  There ex X 3    Total Timed Treatment: 45 min  Total Treatment T

## 2019-02-27 ENCOUNTER — OFFICE VISIT (OUTPATIENT)
Dept: PHYSICAL THERAPY | Age: 39
End: 2019-02-27
Attending: PHYSICIAN ASSISTANT
Payer: COMMERCIAL

## 2019-02-27 PROCEDURE — 97110 THERAPEUTIC EXERCISES: CPT | Performed by: PHYSICAL THERAPIST

## 2019-02-27 NOTE — PROGRESS NOTES
Dx: Microdisectomy lumbar spine L3-L4 on 10/23/2018              Authorized # of Visits:  n/a         Next MD visit: none scheduled  Fall Risk: standard         Precautions: n/a             Subjective: Pt reports having no pain since last visit.  Pt states bike x 5 mins level 4 Recumbent bike x 5 mins level 4 Recumbent bike x 5 mins level 4 Recumbent bike x 5 mins level 4 Recumbent bike x 5 mins level 4   Slant board stretch 30 sec X 3 Slant board stretch 30 sec X 3 Slant board stretch 30 sec X 3 Slant board retro walk YTB 30' X 2   STM to lumbar paraspinals and incision X 10 mins   STM to R SI region, gluteal region, sacral mobs Gr III Hip abd isometric x 20 Standing hip abd with mirror YTB 2 X 10 Seated SB 4# trunk rotation with UE in front of body X 20    M

## 2019-02-28 ENCOUNTER — PATIENT OUTREACH (OUTPATIENT)
Dept: FAMILY MEDICINE CLINIC | Facility: CLINIC | Age: 39
End: 2019-02-28

## 2019-03-04 ENCOUNTER — APPOINTMENT (OUTPATIENT)
Dept: PHYSICAL THERAPY | Age: 39
End: 2019-03-04
Attending: PHYSICIAN ASSISTANT
Payer: COMMERCIAL

## 2019-03-06 ENCOUNTER — OFFICE VISIT (OUTPATIENT)
Dept: PHYSICAL THERAPY | Age: 39
End: 2019-03-06
Attending: PHYSICIAN ASSISTANT
Payer: COMMERCIAL

## 2019-03-06 PROCEDURE — 97110 THERAPEUTIC EXERCISES: CPT | Performed by: PHYSICAL THERAPIST

## 2019-03-07 NOTE — PROGRESS NOTES
Dx: Microdisectomy lumbar spine L3-L4 on 10/23/2018              Authorized # of Visits:  n/a         Next MD visit: none scheduled  Fall Risk: standard         Precautions: n/a            Discharge Summary    Pt has attended 9 visits in Physical Therapy. have decreased paraspinal mm tension for improved lumbar mobility to tolerate standing >60 minutes for work and home activities (12 visits) MET (3/6/2019)  · Pt will be independent and compliant with comprehensive HEP to maintain progress achieved in PT (8 Lower trunk rotation X 20 Seated SB alt leg lifts X 20 Seated SB alt leg lifts X 20 SB pelvic tilts F-B, S-S, circles CW, CCW X 20 ea SB pelvic tilts F-B, S-S, circles CW, CCW X 20 ea Standing lumbar extension X 20 Dynamic HS stretch in neutral, IR, ER 5 Total Timed Treatment: 45 min  Total Treatment Time: 45 min

## 2019-03-11 ENCOUNTER — APPOINTMENT (OUTPATIENT)
Dept: PHYSICAL THERAPY | Age: 39
End: 2019-03-11
Attending: PHYSICIAN ASSISTANT
Payer: COMMERCIAL

## 2019-03-13 ENCOUNTER — APPOINTMENT (OUTPATIENT)
Dept: PHYSICAL THERAPY | Age: 39
End: 2019-03-13
Attending: PHYSICIAN ASSISTANT
Payer: COMMERCIAL

## 2019-03-15 ENCOUNTER — APPOINTMENT (OUTPATIENT)
Dept: GENETICS | Facility: HOSPITAL | Age: 39
End: 2019-03-15
Attending: INTERNAL MEDICINE
Payer: COMMERCIAL

## 2019-03-18 ENCOUNTER — APPOINTMENT (OUTPATIENT)
Dept: PHYSICAL THERAPY | Age: 39
End: 2019-03-18
Attending: PHYSICIAN ASSISTANT
Payer: COMMERCIAL

## 2019-03-19 RX ORDER — PHENTERMINE HYDROCHLORIDE 30 MG/1
30 CAPSULE ORAL EVERY MORNING
Qty: 30 CAPSULE | Refills: 0 | Status: SHIPPED
Start: 2019-03-19 | End: 2019-04-23

## 2019-03-19 NOTE — TELEPHONE ENCOUNTER
Medication(s) to Refill:   Requested Prescriptions     Pending Prescriptions Disp Refills   • Phentermine HCl 30 MG Oral Cap 30 capsule 0     Sig: Take 1 capsule (30 mg total) by mouth every morning.          Reason for Medication Refill being sent to [facility]

## 2019-04-24 ENCOUNTER — OFFICE VISIT (OUTPATIENT)
Dept: SURGERY | Facility: CLINIC | Age: 39
End: 2019-04-24
Payer: COMMERCIAL

## 2019-04-24 VITALS — HEART RATE: 78 BPM | SYSTOLIC BLOOD PRESSURE: 127 MMHG | DIASTOLIC BLOOD PRESSURE: 78 MMHG

## 2019-04-24 DIAGNOSIS — R20.0 NUMBNESS AND TINGLING OF RIGHT ARM: Primary | ICD-10-CM

## 2019-04-24 DIAGNOSIS — R20.2 NUMBNESS AND TINGLING OF RIGHT ARM: Primary | ICD-10-CM

## 2019-04-24 PROCEDURE — 99024 POSTOP FOLLOW-UP VISIT: CPT | Performed by: NEUROLOGICAL SURGERY

## 2019-04-24 RX ORDER — ESCITALOPRAM OXALATE 10 MG/1
10 TABLET ORAL DAILY
Qty: 90 TABLET | Refills: 0 | Status: SHIPPED | OUTPATIENT
Start: 2019-04-24 | End: 2019-08-04

## 2019-04-24 RX ORDER — PHENTERMINE HYDROCHLORIDE 30 MG/1
30 CAPSULE ORAL EVERY MORNING
Qty: 30 CAPSULE | Refills: 0 | Status: SHIPPED
Start: 2019-04-24 | End: 2019-05-29

## 2019-04-24 RX ORDER — ESCITALOPRAM OXALATE 5 MG/1
5 TABLET ORAL DAILY
Qty: 90 TABLET | Refills: 0 | Status: SHIPPED | OUTPATIENT
Start: 2019-04-24 | End: 2019-04-24 | Stop reason: ALTCHOICE

## 2019-04-24 RX ORDER — METHYLPREDNISOLONE 4 MG/1
TABLET ORAL
Qty: 1 PACKAGE | Refills: 0 | Status: SHIPPED | OUTPATIENT
Start: 2019-04-24 | End: 2019-06-07 | Stop reason: ALTCHOICE

## 2019-04-24 NOTE — TELEPHONE ENCOUNTER
Medication(s) to Refill:   Requested Prescriptions     Pending Prescriptions Disp Refills   • escitalopram 10 MG Oral Tab 90 tablet 0     Sig: Take 1 tablet (10 mg total) by mouth daily.  Take along with 5mg tablet for a total of 15mg daily   • escitalopram

## 2019-04-24 NOTE — PROGRESS NOTES
PT here for f/u. Stopped PT in February . Occasional twinge, gait WNL. Mild twinge and numbness to right shoulder and neck.

## 2019-04-24 NOTE — PROGRESS NOTES
[facility]  Neurosurgery Clinic Visit      HISTORY OF PRESENT ILLNESS:  Raffaele Bowser is a(n) 37year old female s/p 10/23/18 L L3-4 discectomy. She is doing well with no back or leg pain.  Has finished PT and is back at work without 6/25/2014    Performed by Vargas Christianson MD at [address] [place] [address]   • TRANSFORAMINAL LUMBAR EPIDURAL STEROID INJECTION SINGLE LEVEL Left 8/15/2018    Performed by Galina Garcia MD at [facility] MAIN OR   • TRANSFORAMINAL LUMBAR EPIDURAL STEROID INJECTION SINGLE LEVEL L medrol, diclofenac  -PT  -if no improvement will consider MRI  -has recovered well from lumbar surgery, no restrictions at this time  -will follow-up arm symptoms and discuss further management if needed    Kelsie Figueroa MD  Neurological Surgeon  [facility]

## 2019-04-29 ENCOUNTER — OFFICE VISIT (OUTPATIENT)
Dept: PHYSICAL THERAPY | Age: 39
End: 2019-04-29
Attending: NEUROLOGICAL SURGERY
Payer: COMMERCIAL

## 2019-04-29 DIAGNOSIS — R20.2 NUMBNESS AND TINGLING OF RIGHT ARM: ICD-10-CM

## 2019-04-29 DIAGNOSIS — R20.0 NUMBNESS AND TINGLING OF RIGHT ARM: ICD-10-CM

## 2019-04-29 PROCEDURE — 97110 THERAPEUTIC EXERCISES: CPT | Performed by: PHYSICAL THERAPIST

## 2019-04-29 PROCEDURE — 97161 PT EVAL LOW COMPLEX 20 MIN: CPT | Performed by: PHYSICAL THERAPIST

## 2019-04-30 NOTE — PROGRESS NOTES
SPINE EVALUATION:   Referring Physician: Dr. Jerel Delacruz  Diagnosis: Numbness and tingling of R arm     Date of Service: 4/29/2019     PATIENT SUMMARY   Mukesh Sullivan is a 37year old y/o female who presents to therapy today with complaints of R shoulder R 82 deg; L 81 deg  Shoulder flex 150 deg without radicular pain, L normal  Shoulder Abd 105 deg without radicular pain, L normal    Accessory motion: mild mid thoracic extension deficit, central PA glide tenderness C2, C3, C4, lateral glide to L tender at active UE movement (8 visits)  · Pt will be independent and compliant with comprehensive HEP to maintain progress achieved in PT (8 visits)    Frequency / Duration: Patient will be seen for 2 x/week or a total of 8 visits over a 90 day period.  Treatment wi

## 2019-05-01 ENCOUNTER — OFFICE VISIT (OUTPATIENT)
Dept: PHYSICAL THERAPY | Age: 39
End: 2019-05-01
Attending: NEUROLOGICAL SURGERY
Payer: COMMERCIAL

## 2019-05-01 PROCEDURE — 97140 MANUAL THERAPY 1/> REGIONS: CPT

## 2019-05-01 PROCEDURE — 97110 THERAPEUTIC EXERCISES: CPT

## 2019-05-01 NOTE — PROGRESS NOTES
Dx: Numbness and tingling of R arm             Authorized # of Visits:  8         Next MD visit: none scheduled  Fall Risk: standard         Precautions: n/a             Subjective: Pt reports that her neck feels about the same since last visit, and she al This treatment was provided under the direct and constant direction and supervision of a licensed therapist, who provided consultation regarding skilled judgements, treatment, and assessment of patient care. Charges:  There ex X 2,

## 2019-05-05 ENCOUNTER — PATIENT MESSAGE (OUTPATIENT)
Dept: FAMILY MEDICINE CLINIC | Facility: CLINIC | Age: 39
End: 2019-05-05

## 2019-05-06 ENCOUNTER — OFFICE VISIT (OUTPATIENT)
Dept: PHYSICAL THERAPY | Age: 39
End: 2019-05-06
Attending: NEUROLOGICAL SURGERY
Payer: COMMERCIAL

## 2019-05-06 PROCEDURE — 97110 THERAPEUTIC EXERCISES: CPT | Performed by: PHYSICAL THERAPIST

## 2019-05-06 PROCEDURE — 97140 MANUAL THERAPY 1/> REGIONS: CPT | Performed by: PHYSICAL THERAPIST

## 2019-05-06 NOTE — TELEPHONE ENCOUNTER
From: Kylee Andujar  To: Martine Parekh MD  Sent: 5/5/2019 10:28 PM CDT  Subject: Non-Urgent Medical Question    Hi Dr. Ramya Grijalva,    I have my 6mo. f/u with Dr. Fam Gill on 5/17 and he is having me get some labs drawn ahead of time.  I’m wondering if ther

## 2019-05-07 NOTE — PROGRESS NOTES
Dx: Numbness and tingling of R arm             Authorized # of Visits:  8         Next MD visit: none scheduled  Fall Risk: standard         Precautions: n/a             Subjective: Pt reports tingling and pain along R side of neck and shoulder/ arm at 4-5 ER RTB X 20        Median nerve glides X 20 Median nerve glides X 20        Ulnar nerve glides X 20 Ulnar nerve glides X 20        Upper trap stretch on R 30 sec X 3 una sh ER with scap retraction RTB X 20        Rows YTB 2 X 10 MAN THERE 15 MINS        MA

## 2019-05-08 ENCOUNTER — OFFICE VISIT (OUTPATIENT)
Dept: PHYSICAL THERAPY | Age: 39
End: 2019-05-08
Attending: NEUROLOGICAL SURGERY
Payer: COMMERCIAL

## 2019-05-08 PROCEDURE — 97140 MANUAL THERAPY 1/> REGIONS: CPT | Performed by: PHYSICAL THERAPIST

## 2019-05-08 PROCEDURE — 97110 THERAPEUTIC EXERCISES: CPT | Performed by: PHYSICAL THERAPIST

## 2019-05-09 NOTE — PROGRESS NOTES
Dx: Numbness and tingling of R arm             Authorized # of Visits:  8         Next MD visit: none scheduled  Fall Risk: standard         Precautions: n/a             Subjective: Pt reports improvement in sleep since last visit.  Pt states she felt good 1/2 FR seated thoracic ext 10 sec X 10 Sh IR/ ER RTB X 20 Supine cervical rotation with retraction X 20       Median nerve glides X 20 Median nerve glides X 20 Prone W lift 2 X 10       Ulnar nerve glides X 20 Ulnar nerve glides X 20 Prone H abd with IR

## 2019-05-13 ENCOUNTER — TELEPHONE (OUTPATIENT)
Dept: PHYSICAL THERAPY | Age: 39
End: 2019-05-13

## 2019-05-13 ENCOUNTER — OFFICE VISIT (OUTPATIENT)
Dept: PHYSICAL THERAPY | Age: 39
End: 2019-05-13
Attending: NEUROLOGICAL SURGERY
Payer: COMMERCIAL

## 2019-05-13 PROCEDURE — 97140 MANUAL THERAPY 1/> REGIONS: CPT | Performed by: PHYSICAL THERAPIST

## 2019-05-13 PROCEDURE — 97110 THERAPEUTIC EXERCISES: CPT | Performed by: PHYSICAL THERAPIST

## 2019-05-13 NOTE — PROGRESS NOTES
Dx: Numbness and tingling of R arm             Authorized # of Visits:  8         Next MD visit: none scheduled  Fall Risk: standard         Precautions: n/a             Subjective: Pt she has some tingling and pain along R side of neck extending to superi stretch 30 sec X 3      1/2 FR thoracic rotation stretch 5 sec X 10 ea Full FR thoracic extension with thrust 3 X 10 Supine cervical retraction X 20 1/2 FR cervical retraction X 20      1/2 FR seated thoracic ext 10 sec X 10 Sh IR/ ER RTB X 20 Supine cervi

## 2019-05-14 DIAGNOSIS — E83.118 OTHER HEMOCHROMATOSIS: Primary | ICD-10-CM

## 2019-05-15 ENCOUNTER — APPOINTMENT (OUTPATIENT)
Dept: PHYSICAL THERAPY | Age: 39
End: 2019-05-15
Attending: NEUROLOGICAL SURGERY
Payer: COMMERCIAL

## 2019-05-17 ENCOUNTER — OFFICE VISIT (OUTPATIENT)
Dept: HEMATOLOGY/ONCOLOGY | Facility: HOSPITAL | Age: 39
End: 2019-05-17
Attending: INTERNAL MEDICINE
Payer: COMMERCIAL

## 2019-05-17 VITALS
HEIGHT: 65 IN | RESPIRATION RATE: 18 BRPM | HEART RATE: 85 BPM | SYSTOLIC BLOOD PRESSURE: 141 MMHG | DIASTOLIC BLOOD PRESSURE: 97 MMHG | TEMPERATURE: 98 F | BODY MASS INDEX: 28.62 KG/M2 | WEIGHT: 171.81 LBS | OXYGEN SATURATION: 97 %

## 2019-05-17 DIAGNOSIS — E83.110 HEREDITARY HEMOCHROMATOSIS (HCC): Primary | ICD-10-CM

## 2019-05-17 PROCEDURE — 99213 OFFICE O/P EST LOW 20 MIN: CPT | Performed by: INTERNAL MEDICINE

## 2019-05-20 ENCOUNTER — OFFICE VISIT (OUTPATIENT)
Dept: PHYSICAL THERAPY | Age: 39
End: 2019-05-20
Attending: NEUROLOGICAL SURGERY
Payer: COMMERCIAL

## 2019-05-20 ENCOUNTER — PATIENT MESSAGE (OUTPATIENT)
Dept: SURGERY | Facility: CLINIC | Age: 39
End: 2019-05-20

## 2019-05-20 PROCEDURE — 97110 THERAPEUTIC EXERCISES: CPT | Performed by: PHYSICAL THERAPIST

## 2019-05-20 RX ORDER — CYCLOBENZAPRINE HCL 10 MG
10 TABLET ORAL 3 TIMES DAILY PRN
Qty: 30 TABLET | Refills: 0 | Status: SHIPPED | OUTPATIENT
Start: 2019-05-20 | End: 2019-06-07

## 2019-05-20 NOTE — PROGRESS NOTES
Dx: Numbness and tingling of R arm             Authorized # of Visits:  8         Next MD visit: none scheduled  Fall Risk: standard         Precautions: n/a             Subjective: Pt states she fell off a ladder yesterday hurting her low back and R shoul 1/2 FR thoracic rotation X 10 Full FR thoracic ext 3 X 10 Levator scapulae stretch 30 sec X 3 Doorway stretch with elbow extension 30 sec X 3     1/2 FR thoracic rotation stretch 5 sec X 10 ea Full FR thoracic extension with thrust 3 X 10 Supine cervical r

## 2019-05-20 NOTE — TELEPHONE ENCOUNTER
From: Leesa Manzo  To: Mahnaz Breen MD  Sent: 5/20/2019 11:57 AM CDT  Subject: Non-Urgent Medical Question    Hi Dr. Alex Naik,    I took a spill from a ladder yesterday and I’m a bit bruised and have some pretty intense pain from my low back and tail

## 2019-05-22 ENCOUNTER — OFFICE VISIT (OUTPATIENT)
Dept: PHYSICAL THERAPY | Age: 39
End: 2019-05-22
Attending: NEUROLOGICAL SURGERY
Payer: COMMERCIAL

## 2019-05-22 PROCEDURE — 97110 THERAPEUTIC EXERCISES: CPT | Performed by: PHYSICAL THERAPIST

## 2019-05-23 NOTE — TELEPHONE ENCOUNTER
Medication: Diclofenac 50 mg    Date of last refill: 4/24/19  [place]    Last office visit: 4/24/19  Due back to clinic per last office note:  N/a   Date next office visit scheduled:  None scheduled    Last OV note re

## 2019-05-23 NOTE — PROGRESS NOTES
Dx: Numbness and tingling of R arm             Authorized # of Visits:  8         Next MD visit: none scheduled  Fall Risk: standard         Precautions: n/a             Subjective: Pt states she noticed tingling today (after 1 wk), pain was as high as 7/1 3 X 10 Levator scapulae stretch 30 sec X 3 Doorway stretch with elbow extension 30 sec X 3 Ulnar and median nerve glides 2 X 10    1/2 FR thoracic rotation stretch 5 sec X 10 ea Full FR thoracic extension with thrust 3 X 10 Supine cervical retraction X 20

## 2019-05-24 ENCOUNTER — NURSE ONLY (OUTPATIENT)
Dept: HEMATOLOGY/ONCOLOGY | Facility: HOSPITAL | Age: 39
End: 2019-05-24
Attending: INTERNAL MEDICINE
Payer: COMMERCIAL

## 2019-05-24 VITALS
RESPIRATION RATE: 16 BRPM | HEIGHT: 65 IN | DIASTOLIC BLOOD PRESSURE: 75 MMHG | HEART RATE: 89 BPM | WEIGHT: 170.19 LBS | BODY MASS INDEX: 28.36 KG/M2 | TEMPERATURE: 98 F | OXYGEN SATURATION: 97 % | SYSTOLIC BLOOD PRESSURE: 119 MMHG

## 2019-05-24 PROCEDURE — 36415 COLL VENOUS BLD VENIPUNCTURE: CPT

## 2019-05-24 PROCEDURE — 85018 HEMOGLOBIN: CPT

## 2019-05-24 PROCEDURE — 36416 COLLJ CAPILLARY BLOOD SPEC: CPT

## 2019-05-24 PROCEDURE — 99195 PHLEBOTOMY: CPT

## 2019-05-24 NOTE — PROGRESS NOTES
Patient Name: Attila Ruiz  : 1980   Medical Record #: [id number]      Therapeutic Donor History    Donor History    Are you feeling ill or unhealthy today:  No    Do you currently have a cold, flu, sore throat, chills, respiratory infection, External Shampoo Apply to the scalp few times weekly Disp: 120 mL Rfl: 0   escitalopram 5 MG Oral Tab Take by mouth See Admin Instructions.  Take along with 10mg tablet for a total of 15mg daily Disp:  Rfl:    Acetaminophen (ACETAMINOPHEN EXTRA STRENGTH) 50

## 2019-05-24 NOTE — PROGRESS NOTES
Patient Name: Yoselin Yoon  : 1980   Medical Record #: [id number]      Therapeutic Donor Physical and Record of Collection    Order:  Therapeutic Phlebotomy  Order Date:  19   Expiration Date:  20  Frequency:  Monthly  Parameters:  T

## 2019-05-29 ENCOUNTER — OFFICE VISIT (OUTPATIENT)
Dept: PHYSICAL THERAPY | Age: 39
End: 2019-05-29
Attending: NEUROLOGICAL SURGERY
Payer: COMMERCIAL

## 2019-05-29 PROCEDURE — 97110 THERAPEUTIC EXERCISES: CPT | Performed by: PHYSICAL THERAPIST

## 2019-05-29 NOTE — TELEPHONE ENCOUNTER
Medication(s) to Refill:   Requested Prescriptions     Pending Prescriptions Disp Refills   • Phentermine HCl 30 MG Oral Cap 30 capsule 0     Sig: Take 1 capsule (30 mg total) by mouth every morning.          Reason for Medication Refill being sent to [facility]

## 2019-05-29 NOTE — PROGRESS NOTES
Cancer Center Progress Note  Patient Name: Seferino Alfonso   YOB: 1980   Medical Record Number: [id number]     Attending Physician: Leticia Mac M.D. Date of Visit: 5/17/19    Chief Complaint:  Patient presents with:   Follow - [facility]   • COLPOSCOPY, CERVIX W/UPPER ADJACENT VAGINA; W/ENDOCERVICAL CURETTAGE  6-2012    cervicitis   • DILATION/CURETTAGE,DIAGNOSTIC  2013    removal benign polyps   • ENDOMETRIAL ABLATION  10/21/2013   • ENDOMETRIAL BX CONJUNCT W/COLPOSCOPY  6-2012   • HY Use      Smoking status: Never Smoker      Smokeless tobacco: Never Used    Substance and Sexual Activity      Alcohol use: Yes        Alcohol/week: 0.0 - 0.6 oz        Comment: rare 1-2/month      Drug use: No      Sexual activity: Yes        Partners:  Esme C[facility] Therapy Pack, Take as directed, Disp: 1 Package, Rfl: 0  •  Eszopiclone (LUNESTA) 2 MG Oral Tab, Take 1 tablet (2 mg total) by mouth nightly as needed. , Disp: 30 tablet, Rfl: 2  •  ALPRAZOLAM 0.25 MG Oral Tab, TAKE 1 TABLET BY MOUTH NIGHTLY AS NEEDED FOR S SpO2 97%   BMI 28.59 kg/m²         Physical Examination:    Constitutional Normal - Mood and affect appropriate. Appears close to chronological age. Well nourished. Well developed. Eyes Normal - Conjunctivae and sclerae are clear and without icterus.  Pup Basophils Absolute Latest Ref Range: 0.00 - 0.10 10ˆ3/µL 0.03   Neutrophils % Latest Units: % 59.7   Lymphocytes % Latest Units: % 33.0   Monocytes % Latest Units: % 6.5   Eosinophils % Latest Units: % 0.4           Radiology:    Pathology:    Impression

## 2019-05-29 NOTE — PROGRESS NOTES
Dx: Numbness and tingling of R arm             Authorized # of Visits:  8         Next MD visit: none scheduled  Fall Risk: standard         Precautions: n/a            Progress Summary    Pt has attended 8, cancelled 1visits in Physical Therapy.      Juan J Gustafsno demonstrate negative neural tension test indicating improved neural mobility with active UE movement (8 visits) MET (5/29/2019)  · Pt will be independent and compliant with comprehensive HEP to maintain progress achieved in PT (8 visits) MET (5/29/2019) thoracic rotation stretch 5 sec X 10 ea Full FR thoracic extension with thrust 3 X 10 Supine cervical retraction X 20 1/2 FR cervical retraction X 20 SB prone rows X 20 Shoulder IR/ ER GTB 2 X 10 Reassessment   1/2 FR seated thoracic ext 10 sec X 10 Sh IR/

## 2019-05-30 RX ORDER — PHENTERMINE HYDROCHLORIDE 30 MG/1
30 CAPSULE ORAL EVERY MORNING
Qty: 30 CAPSULE | Refills: 0 | Status: SHIPPED
Start: 2019-05-30 | End: 2020-02-11

## 2019-06-07 ENCOUNTER — HOSPITAL ENCOUNTER (OUTPATIENT)
Dept: CT IMAGING | Age: 39
Discharge: HOME | End: 2019-06-07
Attending: FAMILY MEDICINE
Payer: COMMERCIAL

## 2019-06-07 ENCOUNTER — OFFICE VISIT (OUTPATIENT)
Dept: FAMILY MEDICINE CLINIC | Facility: CLINIC | Age: 39
End: 2019-06-07
Payer: COMMERCIAL

## 2019-06-07 ENCOUNTER — TELEPHONE (OUTPATIENT)
Dept: FAMILY MEDICINE CLINIC | Facility: CLINIC | Age: 39
End: 2019-06-07

## 2019-06-07 VITALS
HEIGHT: 65 IN | RESPIRATION RATE: 15 BRPM | HEART RATE: 58 BPM | DIASTOLIC BLOOD PRESSURE: 82 MMHG | OXYGEN SATURATION: 98 % | SYSTOLIC BLOOD PRESSURE: 112 MMHG | WEIGHT: 168 LBS | BODY MASS INDEX: 27.99 KG/M2

## 2019-06-07 DIAGNOSIS — S09.90XA INJURY OF HEAD, INITIAL ENCOUNTER: Primary | ICD-10-CM

## 2019-06-07 DIAGNOSIS — S09.90XA INJURY OF HEAD, INITIAL ENCOUNTER: ICD-10-CM

## 2019-06-07 PROCEDURE — 99213 OFFICE O/P EST LOW 20 MIN: CPT | Performed by: FAMILY MEDICINE

## 2019-06-07 PROCEDURE — 70450 CT HEAD/BRAIN W/O DYE: CPT | Performed by: FAMILY MEDICINE

## 2019-06-07 NOTE — TELEPHONE ENCOUNTER
Dr. Grayson Seat requested for CT brain results be called to pt - notified pt of results and to follow-up in 2 weeks or sooner if no improvement of symptoms. She verbalizes understanding.

## 2019-06-07 NOTE — TELEPHONE ENCOUNTER
Spoke to pt to let her know that no pre-cert for CT brain/head was needed. Central Scheduling # provided - she verbalizes understanding. STAT CT brain/head ordered by Dr. Donato Malone.    Spoke to Merary from [facility] - she states that no pre-cert is needed f

## 2019-06-07 NOTE — PROGRESS NOTES
[facility] Family Medicine Office Note  Chief Complaint:   Patient presents with:  FallLanell Drafts from ladder, hit head. Has not been seen in office. x2 weeks.  C/o brain fog, tingling 'pins and needles' feeling in hand and ANUPAM legs, dull headache sin TRANSFORAMINAL LUMBAR EPIDURAL STEROID INJECTION SINGLE LEVEL Left 8/15/2018    Performed by Braxton Hannah MD at [address] [facility]   • TRANSFORAMINAL LUMBAR EPIDURAL STEROID INJECTION SINGLE LEVEL Left 8/1/2018    Performed by Braxton [facility] MD at [facility] MAIN OR   • TRA Review of Systems   Respiratory: Negative for chest tightness and shortness of breath. Cardiovascular: Negative for chest pain. Musculoskeletal: Negative for back pain. Neurological: Positive for light-headedness and headaches.  Negative for weakne List:  Patient Active Problem List:     Other malaise and fatigue     JACKSON favor benign     Stenosing tenosynovitis of finger of left hand     Hemochromatosis     Finger sprain, subsequent encounter     Lumbar radiculopathy

## 2019-06-28 ENCOUNTER — OFFICE VISIT (OUTPATIENT)
Dept: FAMILY MEDICINE CLINIC | Facility: CLINIC | Age: 39
End: 2019-06-28
Payer: COMMERCIAL

## 2019-06-28 ENCOUNTER — NURSE ONLY (OUTPATIENT)
Dept: HEMATOLOGY/ONCOLOGY | Facility: HOSPITAL | Age: 39
End: 2019-06-28
Attending: INTERNAL MEDICINE
Payer: COMMERCIAL

## 2019-06-28 VITALS
WEIGHT: 166.19 LBS | HEIGHT: 65 IN | RESPIRATION RATE: 16 BRPM | DIASTOLIC BLOOD PRESSURE: 98 MMHG | OXYGEN SATURATION: 99 % | TEMPERATURE: 99 F | SYSTOLIC BLOOD PRESSURE: 134 MMHG | HEART RATE: 77 BPM | BODY MASS INDEX: 27.69 KG/M2

## 2019-06-28 VITALS
WEIGHT: 166 LBS | SYSTOLIC BLOOD PRESSURE: 118 MMHG | TEMPERATURE: 99 F | RESPIRATION RATE: 16 BRPM | DIASTOLIC BLOOD PRESSURE: 84 MMHG | HEIGHT: 65 IN | BODY MASS INDEX: 27.66 KG/M2 | HEART RATE: 84 BPM

## 2019-06-28 DIAGNOSIS — E83.119 HEMOCHROMATOSIS, UNSPECIFIED HEMOCHROMATOSIS TYPE: ICD-10-CM

## 2019-06-28 DIAGNOSIS — Z00.00 ANNUAL PHYSICAL EXAM: Primary | ICD-10-CM

## 2019-06-28 DIAGNOSIS — S06.0X0D CONCUSSION WITHOUT LOSS OF CONSCIOUSNESS, SUBSEQUENT ENCOUNTER: ICD-10-CM

## 2019-06-28 DIAGNOSIS — Z71.3 WEIGHT LOSS COUNSELING, ENCOUNTER FOR: ICD-10-CM

## 2019-06-28 PROCEDURE — 99396 PREV VISIT EST AGE 40-64: CPT | Performed by: FAMILY MEDICINE

## 2019-06-28 PROCEDURE — 85018 HEMOGLOBIN: CPT

## 2019-06-28 PROCEDURE — 99214 OFFICE O/P EST MOD 30 MIN: CPT | Performed by: FAMILY MEDICINE

## 2019-06-28 PROCEDURE — 99195 PHLEBOTOMY: CPT

## 2019-06-28 PROCEDURE — 36416 COLLJ CAPILLARY BLOOD SPEC: CPT

## 2019-06-28 RX ORDER — PHENTERMINE HYDROCHLORIDE 15 MG/1
15 CAPSULE ORAL EVERY MORNING
Qty: 30 CAPSULE | Refills: 0 | Status: SHIPPED | OUTPATIENT
Start: 2019-06-28 | End: 2020-02-11

## 2019-06-28 NOTE — PROGRESS NOTES
Chief Complaint:   Patient presents with:  Physical    HPI:   This is a 37year old female presenting for annual physical.     Patient's also presenting for follow up after she was in the ER for fall with head injury with no LOC.    Patient reports mild hea N/A 10/21/2013    Performed by Lyndsey Burris MD at [facility] MAIN OR   • LUMBAR MICRODISCECTOMY 1 LEVEL Left 10/23/2018    Performed by Para Libman, MD at [address]   •   ,    • OTHER  10/23/2018    left lumbar three-four discectomy   • ROBOT-A total) by mouth daily. Take along with 5mg tablet for a total of 15mg daily Disp: 90 tablet Rfl: 0   Eszopiclone (LUNESTA) 2 MG Oral Tab Take 1 tablet (2 mg total) by mouth nightly as needed.  Disp: 30 tablet Rfl: 2   ALPRAZOLAM 0.25 MG Oral Tab TAKE 1 TABL Psychiatric/Behavioral: Negative for suicidal ideas, behavioral problems, sleep disturbance and agitation. The patient is not nervous/anxious.         EXAM:   /84   Pulse 84   Temp 98.5 °F (36.9 °C) (Oral)   Resp 16   Ht 65\"   Wt 166 lb   LMP 06/04 cervical adenopathy. Neurological: She is alert and oriented to person, place, and time. She displays normal reflexes. No cranial nerve deficit, sensory deficit or motor deficit. Coordination and gait normal.   Skin: Skin is warm and dry.  No lesion and n

## 2019-06-28 NOTE — PROGRESS NOTES
Patient Name: Adithya Garnica  : 1980   Medical Record #: [id number]      Therapeutic Donor History    Donor History    Are you feeling ill or unhealthy today:  No    Do you currently have a cold, flu, sore throat, chills, respiratory infection, EXTRA STRENGTH) 500 MG Oral Cap Take 1,000 mg by mouth one time. Disp:  Rfl:        The order for therapeutic phlebotomy and donor history was reviewed. The donor's physical assessment and history meets acceptance criteria parameters.   This was complete No    Post collection blood pressure:  136/97    Collection completed by:  Gina Galloway    Patient discharged:  Ambulatory    Patient accompanied by:  Spouse    Patient is stable and was instructed to call the ordering physician with any questions / concerns.

## 2019-07-19 ENCOUNTER — NURSE ONLY (OUTPATIENT)
Dept: HEMATOLOGY/ONCOLOGY | Facility: HOSPITAL | Age: 39
End: 2019-07-19
Attending: INTERNAL MEDICINE
Payer: COMMERCIAL

## 2019-07-19 VITALS
HEIGHT: 65 IN | RESPIRATION RATE: 16 BRPM | DIASTOLIC BLOOD PRESSURE: 93 MMHG | BODY MASS INDEX: 27.13 KG/M2 | OXYGEN SATURATION: 99 % | HEART RATE: 74 BPM | SYSTOLIC BLOOD PRESSURE: 138 MMHG | WEIGHT: 162.81 LBS | TEMPERATURE: 97 F

## 2019-07-19 PROCEDURE — 85018 HEMOGLOBIN: CPT

## 2019-07-19 PROCEDURE — 36415 COLL VENOUS BLD VENIPUNCTURE: CPT

## 2019-07-19 PROCEDURE — 99195 PHLEBOTOMY: CPT

## 2019-07-19 PROCEDURE — 36416 COLLJ CAPILLARY BLOOD SPEC: CPT

## 2019-07-19 NOTE — PROGRESS NOTES
Patient Name: Mukesh Sullivan  : 1980   Medical Record #: [id number]      Therapeutic Donor Physical and Record of Collection    Order:  Therapeutic Phlebotomy  Order Date:  19 Expiration Date:  20  Frequency:  Monthly  Parameters:   The

## 2019-07-19 NOTE — PROGRESS NOTES
Patient Name: Hannah Bowden  : 1980   Medical Record #: [id number]      Therapeutic Donor History    Donor History    Are you feeling ill or unhealthy today:  No    Do you currently have a cold, flu, sore throat, chills, respiratory infection, EXTRA STRENGTH) 500 MG Oral Cap Take 1,000 mg by mouth one time. Disp:  Rfl:        The order for therapeutic phlebotomy and donor history was reviewed. The donor's physical assessment and history meets acceptance criteria parameters.   This was complete

## 2019-07-30 ENCOUNTER — LAB ENCOUNTER (OUTPATIENT)
Dept: LAB | Age: 39
End: 2019-07-30
Attending: FAMILY MEDICINE
Payer: COMMERCIAL

## 2019-07-30 DIAGNOSIS — Z13.228 CYSTIC FIBROSIS SCREENING: ICD-10-CM

## 2019-07-30 DIAGNOSIS — Z13.21 SCREENING FOR ENDOCRINE, NUTRITIONAL, METABOLIC AND IMMUNITY DISORDER: ICD-10-CM

## 2019-07-30 DIAGNOSIS — Z13.29 SCREENING FOR ENDOCRINE, NUTRITIONAL, METABOLIC AND IMMUNITY DISORDER: ICD-10-CM

## 2019-07-30 DIAGNOSIS — Z13.0 SCREENING FOR ENDOCRINE, NUTRITIONAL, METABOLIC AND IMMUNITY DISORDER: ICD-10-CM

## 2019-07-30 DIAGNOSIS — Z13.0 SCREENING FOR IRON DEFICIENCY ANEMIA: ICD-10-CM

## 2019-07-30 DIAGNOSIS — Z13.29 SCREENING FOR THYROID DISORDER: Primary | ICD-10-CM

## 2019-07-30 DIAGNOSIS — Z13.228 SCREENING FOR ENDOCRINE, NUTRITIONAL, METABOLIC AND IMMUNITY DISORDER: ICD-10-CM

## 2019-07-30 LAB
CHOLEST SMN-MCNC: 145 MG/DL (ref ?–200)
FOLATE SERPL-MCNC: 18.1 NG/ML (ref 8.7–?)
HDLC SERPL-MCNC: 51 MG/DL (ref 40–59)
LDLC SERPL CALC-MCNC: 70 MG/DL (ref ?–100)
NONHDLC SERPL-MCNC: 94 MG/DL (ref ?–130)
T4 FREE SERPL-MCNC: 1 NG/DL (ref 0.8–1.7)
TRIGL SERPL-MCNC: 118 MG/DL (ref 30–149)
TSI SER-ACNC: 1.45 MIU/ML (ref 0.36–3.74)
VIT B12 SERPL-MCNC: 371 PG/ML (ref 193–986)
VIT D+METAB SERPL-MCNC: 25.7 NG/ML (ref 30–100)
VLDLC SERPL CALC-MCNC: 24 MG/DL (ref 0–30)

## 2019-07-30 PROCEDURE — 84439 ASSAY OF FREE THYROXINE: CPT

## 2019-07-30 PROCEDURE — 82607 VITAMIN B-12: CPT

## 2019-07-30 PROCEDURE — 82306 VITAMIN D 25 HYDROXY: CPT

## 2019-07-30 PROCEDURE — 84443 ASSAY THYROID STIM HORMONE: CPT

## 2019-07-30 PROCEDURE — 82746 ASSAY OF FOLIC ACID SERUM: CPT

## 2019-07-30 PROCEDURE — 80061 LIPID PANEL: CPT

## 2019-08-02 DIAGNOSIS — E55.9 VITAMIN D DEFICIENCY: Primary | ICD-10-CM

## 2019-08-04 RX ORDER — ESCITALOPRAM OXALATE 10 MG/1
10 TABLET ORAL DAILY
Qty: 90 TABLET | Refills: 0 | Status: SHIPPED | OUTPATIENT
Start: 2019-08-04 | End: 2019-11-12

## 2019-08-04 RX ORDER — ESCITALOPRAM OXALATE 5 MG/1
5 TABLET ORAL DAILY
Qty: 90 TABLET | Refills: 0 | Status: SHIPPED | OUTPATIENT
Start: 2019-08-04 | End: 2019-11-12

## 2019-08-10 ENCOUNTER — TELEPHONE (OUTPATIENT)
Dept: FAMILY MEDICINE CLINIC | Facility: CLINIC | Age: 39
End: 2019-08-10

## 2019-08-10 DIAGNOSIS — G47.09 OTHER INSOMNIA: ICD-10-CM

## 2019-08-12 RX ORDER — PHENTERMINE HYDROCHLORIDE 15 MG/1
15 CAPSULE ORAL EVERY MORNING
Qty: 30 CAPSULE | Refills: 0 | Status: CANCELLED
Start: 2019-08-12

## 2019-08-12 NOTE — TELEPHONE ENCOUNTER
Patient actually no longer wishes to take the phentermine. She says she uses the Lunesta PRN for sleep and the Xanax PRN for anxiety.

## 2019-08-12 NOTE — TELEPHONE ENCOUNTER
Medication(s) to Refill:   Requested Prescriptions     Pending Prescriptions Disp Refills   • ALPRAZolam 0.25 MG Oral Tab 20 tablet 2     Sig: Take 1 tablet (0.25 mg total) by mouth nightly as needed for Sleep.    • Eszopiclone (LUNESTA) 2 MG Oral Tab 30 ta

## 2019-08-12 NOTE — TELEPHONE ENCOUNTER
Is she using both Lunesta and Xanax for sleep? Also, she needs OV for weight check to refill phentermine.

## 2019-08-13 NOTE — TELEPHONE ENCOUNTER
Ok to phone in refills as pended. Please make sure patient aware not to take medications together d/t increased risk for respiratory depression.

## 2019-08-13 NOTE — TELEPHONE ENCOUNTER
Phoned in medication. Tried to call patient with no answer. Let the patient know to call the office back in regards to her medications.

## 2019-08-15 RX ORDER — ESZOPICLONE 2 MG/1
2 TABLET, FILM COATED ORAL NIGHTLY PRN
Qty: 30 TABLET | Refills: 0 | COMMUNITY
Start: 2019-08-15 | End: 2019-12-03

## 2019-08-15 RX ORDER — ALPRAZOLAM 0.25 MG/1
0.25 TABLET ORAL DAILY PRN
Qty: 20 TABLET | Refills: 2 | COMMUNITY
Start: 2019-08-15 | End: 2020-04-06

## 2019-09-23 ENCOUNTER — TELEPHONE (OUTPATIENT)
Dept: FAMILY MEDICINE CLINIC | Facility: CLINIC | Age: 39
End: 2019-09-23

## 2019-09-23 DIAGNOSIS — N64.52 NIPPLE DISCHARGE: Primary | ICD-10-CM

## 2019-09-23 NOTE — TELEPHONE ENCOUNTER
Patient called for an order for a diagnostic Mammogram, there is an order for a screening mammo but patient is now having bloody nipple discharge. Thank you.

## 2019-09-23 NOTE — TELEPHONE ENCOUNTER
LM on pt cell phone to call the office back. Order placed in [facility]

## 2019-09-23 NOTE — TELEPHONE ENCOUNTER
Patient is overdue for her screening mammogram.  However, patient is now having bloody nipple discharge and is asking for diagnostic mammogram.  Okay for order? Any additional orders? Please advise. Thank you!

## 2019-09-24 ENCOUNTER — PATIENT MESSAGE (OUTPATIENT)
Dept: FAMILY MEDICINE CLINIC | Facility: CLINIC | Age: 39
End: 2019-09-24

## 2019-09-24 DIAGNOSIS — N64.52 NIPPLE DISCHARGE: ICD-10-CM

## 2019-09-24 DIAGNOSIS — R92.8 ABNORMAL MAMMOGRAM: Primary | ICD-10-CM

## 2019-09-24 NOTE — TELEPHONE ENCOUNTER
Patient had completed he diagnostic mammogram yesterday. Patient received her results from radiology. Patient is requesting an order for a breast MRI as recommended by radiology. Okay for order?   Patient is also requesting a recommendation for a breast

## 2019-09-24 NOTE — TELEPHONE ENCOUNTER
From: Kamar Lea  To: Giulia Elise MD  Sent: 9/24/2019 10:31 AM CDT  Subject: Test Results Question    Hi Dr. Yousuf Andersen,    I had my mammogram and breast u/s for bloody nipple discharge.  The radiologist wanted me to reach out to you to request an orde

## 2019-10-04 ENCOUNTER — TELEPHONE (OUTPATIENT)
Dept: FAMILY MEDICINE CLINIC | Facility: CLINIC | Age: 39
End: 2019-10-04

## 2019-10-11 NOTE — TELEPHONE ENCOUNTER
Radiology dept contacted pt with the results per the MRI breast report and the US breast report. Did lmom for pt asking her if she had any questions to call our office.

## 2019-11-12 RX ORDER — ESCITALOPRAM OXALATE 5 MG/1
5 TABLET ORAL DAILY
Qty: 90 TABLET | Refills: 0 | Status: SHIPPED | OUTPATIENT
Start: 2019-11-12 | End: 2019-12-03

## 2019-11-12 RX ORDER — ESCITALOPRAM OXALATE 10 MG/1
10 TABLET ORAL DAILY
Qty: 90 TABLET | Refills: 0 | Status: SHIPPED | OUTPATIENT
Start: 2019-11-12 | End: 2019-12-03

## 2019-11-12 NOTE — TELEPHONE ENCOUNTER
Medication(s) to Refill:   Requested Prescriptions     Pending Prescriptions Disp Refills   • ESCITALOPRAM 5 MG Oral Tab [Pharmacy Med Name: ESCITALOPRAM 5 MG TABLET] 90 tablet 0     Sig: TAKE 1 TABLET (5 MG TOTAL) BY MOUTH DAILY.  TAKE ALONG WITH 10MG TABL

## 2019-12-03 DIAGNOSIS — G47.09 OTHER INSOMNIA: ICD-10-CM

## 2019-12-05 RX ORDER — ESZOPICLONE 2 MG/1
2 TABLET, FILM COATED ORAL NIGHTLY PRN
Qty: 30 TABLET | Refills: 0 | Status: SHIPPED | OUTPATIENT
Start: 2019-12-05 | End: 2020-03-29

## 2019-12-05 RX ORDER — ESCITALOPRAM OXALATE 5 MG/1
5 TABLET ORAL DAILY
Qty: 90 TABLET | Refills: 0 | Status: SHIPPED | OUTPATIENT
Start: 2019-12-05 | End: 2020-03-02

## 2019-12-05 RX ORDER — ESCITALOPRAM OXALATE 10 MG/1
10 TABLET ORAL DAILY
Qty: 90 TABLET | Refills: 0 | Status: SHIPPED | OUTPATIENT
Start: 2019-12-05 | End: 2020-02-27

## 2020-01-29 ENCOUNTER — GENETICS ENCOUNTER (OUTPATIENT)
Dept: GENETICS | Age: 40
End: 2020-01-29
Attending: GENETIC COUNSELOR, MS
Payer: COMMERCIAL

## 2020-01-29 PROCEDURE — 36415 COLL VENOUS BLD VENIPUNCTURE: CPT

## 2020-01-29 PROCEDURE — 96040 HC GENETIC COUNSELING EA 30 MIN: CPT | Performed by: GENETIC COUNSELOR, MS

## 2020-01-29 NOTE — PROGRESS NOTES
Referring Provider: Tony Crenshaw MD    Additional Provider: Radha Rutherford MD    Reason for Referral:  Thuan Bustos was referred for genetic counseling because of a family history of breast and ovarian cancer.   Ms. Ryan Lau is a 37year-old woman of [facility]’s paternal grandfather had prostate cancer in his 62s but  in his early 76s from multiple sclerosis. A paternal great-aunt had colorectal cancer in his 46s and another paternal great-aunt  in her late 45s from brain cancer.   Please see the the genetic test, or that the family is dealing with a hereditary cancer syndrome involving a different gene. It is also possible that Ms. Calvo’s relatives have a pathogenic variant in one of these genes that Ms. Calvo did not inherit.  In this scenari lifetime risk for a contralateral breast cancer after an initial breast cancer diagnosis. There is also up to a 20-50% lifetime risk of ovarian cancer in BRCA1/2 mutation carriers.   Other cancer risks in carriers include an increased risk for prostate can

## 2020-02-11 ENCOUNTER — OFFICE VISIT (OUTPATIENT)
Dept: FAMILY MEDICINE CLINIC | Facility: CLINIC | Age: 40
End: 2020-02-11
Payer: COMMERCIAL

## 2020-02-11 DIAGNOSIS — R51.9 ACUTE NONINTRACTABLE HEADACHE, UNSPECIFIED HEADACHE TYPE: Primary | ICD-10-CM

## 2020-02-11 DIAGNOSIS — R51.9 HEADACHE DISORDER: ICD-10-CM

## 2020-02-11 PROCEDURE — 99214 OFFICE O/P EST MOD 30 MIN: CPT | Performed by: FAMILY MEDICINE

## 2020-02-11 RX ORDER — METHYLPREDNISOLONE 4 MG/1
TABLET ORAL
Qty: 1 KIT | Refills: 0 | Status: SHIPPED | OUTPATIENT
Start: 2020-02-11 | End: 2020-06-08

## 2020-02-11 RX ORDER — ONDANSETRON 4 MG/1
4 TABLET, FILM COATED ORAL EVERY 8 HOURS PRN
Qty: 20 TABLET | Refills: 1 | Status: SHIPPED | OUTPATIENT
Start: 2020-02-11

## 2020-02-12 VITALS
BODY MASS INDEX: 29.51 KG/M2 | HEIGHT: 64.5 IN | TEMPERATURE: 98 F | DIASTOLIC BLOOD PRESSURE: 88 MMHG | RESPIRATION RATE: 16 BRPM | HEART RATE: 78 BPM | SYSTOLIC BLOOD PRESSURE: 138 MMHG | WEIGHT: 175 LBS

## 2020-02-12 NOTE — PROGRESS NOTES
Patient presents with:  Nausea: on and off for the past month or two   Headache: noticed last monday/tuesday   Patient complains of headaches. HPI:  Has had for: few weeks.  Pain is-  No associated neck pain:  Headaches last:  Associated symptoms: mil Take 1,000 mg by mouth one time.           Past Medical History:   Diagnosis Date   • JACKSON favor benign 6/2012   • Allergic rhinitis    • Anxiety state    • Back problem    • Blood disorder     HEMOCHROMATOSIS   • Dysplasia of cervix, low grade (DEE 1) 06/1 Aunt 43        dx age- 43   • Cancer Maternal Aunt 35        Cervical   • Cancer Maternal Aunt 48        colon   • Cancer Paternal Grandmother    • Heart Disorder Paternal Grandmother    • Heart Disorder Maternal Grandfather    • Hypertension Sister    • L

## 2020-02-15 ENCOUNTER — PATIENT MESSAGE (OUTPATIENT)
Dept: FAMILY MEDICINE CLINIC | Facility: CLINIC | Age: 40
End: 2020-02-15

## 2020-02-16 ENCOUNTER — GENETICS ENCOUNTER (OUTPATIENT)
Dept: HEMATOLOGY/ONCOLOGY | Facility: HOSPITAL | Age: 40
End: 2020-02-16

## 2020-02-16 NOTE — PROGRESS NOTES
Referring Provider:       Amrita Lutz MD     Additional Provider:     Nimo Cook MD    Reason for Referral:  Kathryn Rodriguez had genetic testing performed on 1/29/2020 because of a family history of breast and ovarian cancer.      Genetic Testing Result

## 2020-02-17 NOTE — TELEPHONE ENCOUNTER
Let patient know that Medrol does have a side effect of elevated heart rate and blood pressure if symptoms are stable, have her continue oral steroid.  Stop if worsening tachycardia, chest pain, increased anxiety and BP

## 2020-02-17 NOTE — TELEPHONE ENCOUNTER
From: Mian Keith  To: Christi Lopez MD  Sent: 2/15/2020 8:31 PM CST  Subject: Visit Follow-up Question    Hi Dr. Lizeth Gillespie,    I wanted to let you know that my head is feeling better since starting the steroid pack.  My left ear is about the same, but de

## 2020-02-17 NOTE — TELEPHONE ENCOUNTER
Patient reports that her BP has remained elevated since her office visit. Patient is taking the medrol dose pack also. Patient reports her BP from 160/97 to 150/92. Please advise. Thank you!

## 2020-02-18 ENCOUNTER — MED REC SCAN ONLY (OUTPATIENT)
Dept: FAMILY MEDICINE CLINIC | Facility: CLINIC | Age: 40
End: 2020-02-18

## 2020-02-18 NOTE — TELEPHONE ENCOUNTER
[place] [facility] [place] spoke with Johan.  She states that patient's labs were sent to [facility] for processing. Will contact [facility] in the morning for results. Will kaylie for follow-up.

## 2020-02-18 NOTE — TELEPHONE ENCOUNTER
Patient had labs completed last week, it appears at an outside lab. Did you receive these results? Please advise. Thank you!

## 2020-02-19 ENCOUNTER — TELEPHONE (OUTPATIENT)
Dept: FAMILY MEDICINE CLINIC | Facility: CLINIC | Age: 40
End: 2020-02-19

## 2020-02-19 DIAGNOSIS — E53.8 VITAMIN B12 DEFICIENCY: Primary | ICD-10-CM

## 2020-02-19 NOTE — TELEPHONE ENCOUNTER
Called patient and spoke with her. Advised her of results and POC below. Patient states understanding. Repeat lab orders placed and nurse visit scheduled as requested.

## 2020-02-19 NOTE — TELEPHONE ENCOUNTER
Dolores and spoke with [employer]. Results to be faxed to the office. Triage fax number given. Will kaylie for follow-up.

## 2020-02-19 NOTE — TELEPHONE ENCOUNTER
I got her labs from [employer] [employer]      B12 is low(316). Needs b12 1000 mcg IM every other week for 3 months, then once a month until recheck b12 and folate in 6 months.

## 2020-02-21 ENCOUNTER — NURSE ONLY (OUTPATIENT)
Dept: FAMILY MEDICINE CLINIC | Facility: CLINIC | Age: 40
End: 2020-02-21
Payer: COMMERCIAL

## 2020-02-21 DIAGNOSIS — E53.8 B12 DEFICIENCY: Primary | ICD-10-CM

## 2020-02-21 PROCEDURE — 96372 THER/PROPH/DIAG INJ SC/IM: CPT | Performed by: FAMILY MEDICINE

## 2020-02-21 RX ORDER — CYANOCOBALAMIN 1000 UG/ML
1000 INJECTION INTRAMUSCULAR; SUBCUTANEOUS ONCE
Status: COMPLETED | OUTPATIENT
Start: 2020-02-21 | End: 2020-02-21

## 2020-02-21 RX ADMIN — CYANOCOBALAMIN 1000 MCG: 1000 INJECTION INTRAMUSCULAR; SUBCUTANEOUS at 10:26:00

## 2020-02-27 RX ORDER — ESCITALOPRAM OXALATE 10 MG/1
10 TABLET ORAL DAILY
Qty: 90 TABLET | Refills: 0 | Status: SHIPPED | OUTPATIENT
Start: 2020-02-27 | End: 2020-06-10

## 2020-03-02 RX ORDER — ESCITALOPRAM OXALATE 5 MG/1
5 TABLET ORAL DAILY
Qty: 90 TABLET | Refills: 0 | Status: SHIPPED | OUTPATIENT
Start: 2020-03-02 | End: 2020-06-01

## 2020-03-29 DIAGNOSIS — G47.09 OTHER INSOMNIA: ICD-10-CM

## 2020-03-30 RX ORDER — ESZOPICLONE 2 MG/1
2 TABLET, FILM COATED ORAL NIGHTLY PRN
Qty: 30 TABLET | Refills: 0 | Status: SHIPPED | OUTPATIENT
Start: 2020-03-30 | End: 2020-07-25

## 2020-04-04 DIAGNOSIS — G47.09 OTHER INSOMNIA: ICD-10-CM

## 2020-04-06 RX ORDER — ALPRAZOLAM 0.25 MG/1
TABLET ORAL
Qty: 20 TABLET | Refills: 0 | Status: SHIPPED | OUTPATIENT
Start: 2020-04-06 | End: 2020-07-25

## 2020-04-06 NOTE — TELEPHONE ENCOUNTER
Medication(s) to Refill:   Requested Prescriptions     Pending Prescriptions Disp Refills   • ALPRAZOLAM 0.25 MG Oral Tab [Pharmacy Med Name: ALPRAZOLAM 0.25 MG TABLET] 20 tablet 0     Sig: TAKE 1 TABLET BY MOUTH DAILY AS NEEDED FOR ANXIETY         Reason

## 2020-04-19 ENCOUNTER — PATIENT MESSAGE (OUTPATIENT)
Dept: FAMILY MEDICINE CLINIC | Facility: CLINIC | Age: 40
End: 2020-04-19

## 2020-04-20 NOTE — TELEPHONE ENCOUNTER
From: Ignacio Leavitt  To: Amrita Lutz MD  Sent: 4/19/2020 11:14 AM CDT  Subject: Non-Urgent Medical Question    Hi Dr. Annabella Andujar,    I have attached two pictures of my left eye when I woke up this morning.  I was working in my yard yesterday and noticed a

## 2020-06-01 RX ORDER — ESCITALOPRAM OXALATE 5 MG/1
5 TABLET ORAL DAILY
Qty: 90 TABLET | Refills: 0 | Status: SHIPPED | OUTPATIENT
Start: 2020-06-01 | End: 2020-08-31

## 2020-06-08 ENCOUNTER — OFFICE VISIT (OUTPATIENT)
Dept: FAMILY MEDICINE CLINIC | Facility: CLINIC | Age: 40
End: 2020-06-08
Payer: COMMERCIAL

## 2020-06-08 DIAGNOSIS — R03.0 ELEVATED BLOOD PRESSURE READING: ICD-10-CM

## 2020-06-08 DIAGNOSIS — E53.8 B12 DEFICIENCY: Primary | ICD-10-CM

## 2020-06-08 DIAGNOSIS — M79.89 SWELLING OF LEFT LOWER EXTREMITY: ICD-10-CM

## 2020-06-08 DIAGNOSIS — Z71.3 WEIGHT LOSS COUNSELING, ENCOUNTER FOR: ICD-10-CM

## 2020-06-08 PROCEDURE — 96372 THER/PROPH/DIAG INJ SC/IM: CPT | Performed by: FAMILY MEDICINE

## 2020-06-08 PROCEDURE — 99214 OFFICE O/P EST MOD 30 MIN: CPT | Performed by: FAMILY MEDICINE

## 2020-06-08 RX ORDER — PHENTERMINE HYDROCHLORIDE 15 MG/1
15 CAPSULE ORAL EVERY MORNING
Qty: 30 CAPSULE | Refills: 1 | Status: SHIPPED | OUTPATIENT
Start: 2020-06-08 | End: 2021-12-08 | Stop reason: ALTCHOICE

## 2020-06-08 RX ORDER — SPIRONOLACTONE 25 MG/1
25 TABLET ORAL DAILY
Qty: 30 TABLET | Refills: 3 | Status: SHIPPED | OUTPATIENT
Start: 2020-06-08 | End: 2020-08-31

## 2020-06-08 RX ADMIN — CYANOCOBALAMIN 1000 MCG: 1000 INJECTION INTRAMUSCULAR; SUBCUTANEOUS at 11:31:00

## 2020-06-09 VITALS
HEIGHT: 64.5 IN | HEART RATE: 88 BPM | OXYGEN SATURATION: 97 % | SYSTOLIC BLOOD PRESSURE: 138 MMHG | RESPIRATION RATE: 16 BRPM | TEMPERATURE: 99 F | DIASTOLIC BLOOD PRESSURE: 88 MMHG | WEIGHT: 183 LBS | BODY MASS INDEX: 30.86 KG/M2

## 2020-06-09 RX ORDER — CYANOCOBALAMIN 1000 UG/ML
1000 INJECTION INTRAMUSCULAR; SUBCUTANEOUS ONCE
Status: COMPLETED | OUTPATIENT
Start: 2020-06-09 | End: 2020-06-08

## 2020-06-09 NOTE — PROGRESS NOTES
Chief Complaint:   Patient presents with:  Weight Problem  Fatigue  Anxiety    HPI:   This is a 41year old female presenting with worsening lower extremity swelling.    She has a history of B12 deficiency she is due for recheck of B12 she has not had a B12 ADJACENT VAGINA; W/ENDOCERVICAL CURETTAGE  6-2012    cervicitis   • DILATION/CURETTAGE,DIAGNOSTIC  2013    removal benign polyps   • ENDOMETRIAL ABLATION  10/21/2013   • ENDOMETRIAL BX CONJUNCT W/COLPOSCOPY  6-2012   • HYSTERECTOMY  6/2014    partial, stil Medication Sig Dispense Refill   • spironolactone 25 MG Oral Tab Take 1 tablet (25 mg total) by mouth daily. 30 tablet 3   • Phentermine HCl 15 MG Oral Cap Take 1 capsule (15 mg total) by mouth every morning.  30 capsule 1   • ESCITALOPRAM 5 MG Oral Tab T and neck stiffness. Skin: Negative for color change, pallor, rash and wound. Allergic/Immunologic: Negative for environmental allergies, food allergies and immunocompromised state.    Neurological: Negative for dizziness, weakness, light-headedness and stridor. No respiratory distress. She has no wheezes. She has no rales. She exhibits no tenderness. Abdominal: Soft. Bowel sounds are normal. She exhibits no distension and no mass. There is no hepatosplenomegaly. There is no tenderness.  There is no rebo

## 2020-06-10 RX ORDER — ESCITALOPRAM OXALATE 10 MG/1
10 TABLET ORAL DAILY
Qty: 90 TABLET | Refills: 0 | Status: SHIPPED | OUTPATIENT
Start: 2020-06-10 | End: 2020-08-31

## 2020-07-25 ENCOUNTER — LABORATORY ENCOUNTER (OUTPATIENT)
Dept: LAB | Age: 40
End: 2020-07-25
Attending: FAMILY MEDICINE
Payer: COMMERCIAL

## 2020-07-25 DIAGNOSIS — E83.110 HEREDITARY HEMOCHROMATOSIS (HCC): ICD-10-CM

## 2020-07-25 DIAGNOSIS — G47.09 OTHER INSOMNIA: ICD-10-CM

## 2020-07-25 DIAGNOSIS — E53.8 VITAMIN B12 DEFICIENCY: ICD-10-CM

## 2020-07-25 DIAGNOSIS — R51.9 HEADACHE DISORDER: ICD-10-CM

## 2020-07-25 DIAGNOSIS — E55.9 VITAMIN D DEFICIENCY: ICD-10-CM

## 2020-07-25 DIAGNOSIS — R51.9 ACUTE NONINTRACTABLE HEADACHE, UNSPECIFIED HEADACHE TYPE: ICD-10-CM

## 2020-07-25 LAB
ALBUMIN SERPL-MCNC: 4.3 G/DL (ref 3.4–5)
ALBUMIN/GLOB SERPL: 1.3 {RATIO} (ref 1–2)
ALP LIVER SERPL-CCNC: 82 U/L (ref 37–98)
ALT SERPL-CCNC: 52 U/L (ref 13–56)
ANION GAP SERPL CALC-SCNC: 3 MMOL/L (ref 0–18)
AST SERPL-CCNC: 31 U/L (ref 15–37)
BASOPHILS # BLD AUTO: 0.06 X10(3) UL (ref 0–0.2)
BASOPHILS NFR BLD AUTO: 0.8 %
BILIRUB SERPL-MCNC: 0.4 MG/DL (ref 0.1–2)
BUN BLD-MCNC: 12 MG/DL (ref 7–18)
BUN/CREAT SERPL: 14.5 (ref 10–20)
CALCIUM BLD-MCNC: 9.3 MG/DL (ref 8.5–10.1)
CHLORIDE SERPL-SCNC: 107 MMOL/L (ref 98–112)
CO2 SERPL-SCNC: 28 MMOL/L (ref 21–32)
CREAT BLD-MCNC: 0.83 MG/DL (ref 0.55–1.02)
DEPRECATED HBV CORE AB SER IA-ACNC: 66.2 NG/ML (ref 12–240)
DEPRECATED RDW RBC AUTO: 46.9 FL (ref 35.1–46.3)
EOSINOPHIL # BLD AUTO: 0.13 X10(3) UL (ref 0–0.7)
EOSINOPHIL NFR BLD AUTO: 1.7 %
ERYTHROCYTE [DISTWIDTH] IN BLOOD BY AUTOMATED COUNT: 12.3 % (ref 11–15)
GLOBULIN PLAS-MCNC: 3.4 G/DL (ref 2.8–4.4)
GLUCOSE BLD-MCNC: 108 MG/DL (ref 70–99)
HCT VFR BLD AUTO: 44.9 % (ref 35–48)
HGB BLD-MCNC: 14.6 G/DL (ref 12–16)
IMM GRANULOCYTES # BLD AUTO: 0.01 X10(3) UL (ref 0–1)
IMM GRANULOCYTES NFR BLD: 0.1 %
IRON SATURATION: 37 % (ref 15–50)
IRON SERPL-MCNC: 119 UG/DL (ref 50–170)
LYMPHOCYTES # BLD AUTO: 2.37 X10(3) UL (ref 1–4)
LYMPHOCYTES NFR BLD AUTO: 30.5 %
M PROTEIN MFR SERPL ELPH: 7.7 G/DL (ref 6.4–8.2)
MCH RBC QN AUTO: 33.8 PG (ref 26–34)
MCHC RBC AUTO-ENTMCNC: 32.5 G/DL (ref 31–37)
MCV RBC AUTO: 103.9 FL (ref 80–100)
MONOCYTES # BLD AUTO: 0.55 X10(3) UL (ref 0.1–1)
MONOCYTES NFR BLD AUTO: 7.1 %
NEUTROPHILS # BLD AUTO: 4.65 X10 (3) UL (ref 1.5–7.7)
NEUTROPHILS # BLD AUTO: 4.65 X10(3) UL (ref 1.5–7.7)
NEUTROPHILS NFR BLD AUTO: 59.8 %
OSMOLALITY SERPL CALC.SUM OF ELEC: 286 MOSM/KG (ref 275–295)
PATIENT FASTING Y/N/NP: YES
PLATELET # BLD AUTO: 331 10(3)UL (ref 150–450)
POTASSIUM SERPL-SCNC: 4.3 MMOL/L (ref 3.5–5.1)
RBC # BLD AUTO: 4.32 X10(6)UL (ref 3.8–5.3)
SED RATE-ML: 9 MM/HR (ref 0–25)
SODIUM SERPL-SCNC: 138 MMOL/L (ref 136–145)
T4 FREE SERPL-MCNC: 0.8 NG/DL (ref 0.8–1.7)
TOTAL IRON BINDING CAPACITY: 325 UG/DL (ref 240–450)
TRANSFERRIN SERPL-MCNC: 218 MG/DL (ref 200–360)
TSI SER-ACNC: 1.35 MIU/ML (ref 0.36–3.74)
VIT D+METAB SERPL-MCNC: 29.6 NG/ML (ref 30–100)
WBC # BLD AUTO: 7.8 X10(3) UL (ref 4–11)

## 2020-07-25 PROCEDURE — 36415 COLL VENOUS BLD VENIPUNCTURE: CPT | Performed by: FAMILY MEDICINE

## 2020-07-25 PROCEDURE — 85652 RBC SED RATE AUTOMATED: CPT | Performed by: FAMILY MEDICINE

## 2020-07-25 PROCEDURE — 86038 ANTINUCLEAR ANTIBODIES: CPT | Performed by: FAMILY MEDICINE

## 2020-07-25 PROCEDURE — 84439 ASSAY OF FREE THYROXINE: CPT | Performed by: FAMILY MEDICINE

## 2020-07-25 PROCEDURE — 80050 GENERAL HEALTH PANEL: CPT | Performed by: FAMILY MEDICINE

## 2020-07-25 PROCEDURE — 82306 VITAMIN D 25 HYDROXY: CPT | Performed by: FAMILY MEDICINE

## 2020-07-27 RX ORDER — ESZOPICLONE 2 MG/1
2 TABLET, FILM COATED ORAL NIGHTLY PRN
Qty: 30 TABLET | Refills: 0 | Status: SHIPPED | OUTPATIENT
Start: 2020-07-27 | End: 2020-10-20

## 2020-07-27 RX ORDER — ALPRAZOLAM 0.25 MG/1
0.25 TABLET ORAL
Qty: 20 TABLET | Refills: 0 | Status: SHIPPED | OUTPATIENT
Start: 2020-07-27 | End: 2021-06-07

## 2020-07-27 NOTE — TELEPHONE ENCOUNTER
Medication(s) to Refill:   Requested Prescriptions     Pending Prescriptions Disp Refills   • Eszopiclone (LUNESTA) 2 MG Oral Tab 30 tablet 0     Sig: Take 1 tablet (2 mg total) by mouth nightly as needed.          Reason for Medication Refill being sent to

## 2020-08-01 LAB — ANA SCREEN: NEGATIVE

## 2020-08-04 ENCOUNTER — TELEPHONE (OUTPATIENT)
Dept: FAMILY MEDICINE CLINIC | Facility: CLINIC | Age: 40
End: 2020-08-04

## 2020-08-04 NOTE — TELEPHONE ENCOUNTER
----- Message from Cory Gaffney MD sent at 8/3/2020 12:04 PM CDT -----  B12 is low. Needs b12 1000 mcg IM every other week for 3 months, then once a month until recheck b12 and folate in 6 months. Otherwise-labs are stable. CLAUDINE-negative.

## 2020-08-07 ENCOUNTER — PATIENT MESSAGE (OUTPATIENT)
Dept: FAMILY MEDICINE CLINIC | Facility: CLINIC | Age: 40
End: 2020-08-07

## 2020-08-10 NOTE — TELEPHONE ENCOUNTER
Please see above message. Patient is asking if her low Vitamin D and B12 levels could be the cause of her itching. Please advise. Thank you!

## 2020-08-10 NOTE — TELEPHONE ENCOUNTER
From: Mian Keith  To: Christi Lopez MD  Sent: 8/7/2020 3:11 PM CDT  Subject: Test Results Question    Hi Dr. Lizeth Gillespie-    I received the results from my labs and would like to proceed with the B12 injections.  I just got back from vacation and will call

## 2020-08-25 ENCOUNTER — NURSE ONLY (OUTPATIENT)
Dept: FAMILY MEDICINE CLINIC | Facility: CLINIC | Age: 40
End: 2020-08-25
Payer: COMMERCIAL

## 2020-08-25 DIAGNOSIS — G47.09 OTHER INSOMNIA: ICD-10-CM

## 2020-08-25 DIAGNOSIS — E53.8 B12 DEFICIENCY: Primary | ICD-10-CM

## 2020-08-25 PROCEDURE — 96372 THER/PROPH/DIAG INJ SC/IM: CPT | Performed by: EMERGENCY MEDICINE

## 2020-08-25 RX ORDER — ALPRAZOLAM 0.25 MG/1
0.25 TABLET ORAL
Qty: 20 TABLET | Refills: 0 | Status: CANCELLED | OUTPATIENT
Start: 2020-08-25

## 2020-08-25 RX ORDER — CYANOCOBALAMIN 1000 UG/ML
INJECTION INTRAMUSCULAR; SUBCUTANEOUS
Qty: 23 ML | Refills: 0 | Status: SHIPPED | OUTPATIENT
Start: 2020-08-25 | End: 2021-02-12 | Stop reason: ALTCHOICE

## 2020-08-25 RX ORDER — CYANOCOBALAMIN 1000 UG/ML
1000 INJECTION INTRAMUSCULAR; SUBCUTANEOUS ONCE
Status: COMPLETED | OUTPATIENT
Start: 2020-08-25 | End: 2020-08-25

## 2020-08-25 RX ADMIN — CYANOCOBALAMIN 1000 MCG: 1000 INJECTION INTRAMUSCULAR; SUBCUTANEOUS at 16:53:00

## 2020-08-27 ENCOUNTER — TELEPHONE (OUTPATIENT)
Dept: FAMILY MEDICINE CLINIC | Facility: CLINIC | Age: 40
End: 2020-08-27

## 2020-08-27 ENCOUNTER — PATIENT MESSAGE (OUTPATIENT)
Dept: FAMILY MEDICINE CLINIC | Facility: CLINIC | Age: 40
End: 2020-08-27

## 2020-08-27 DIAGNOSIS — J02.9 SORE THROAT: Primary | ICD-10-CM

## 2020-08-27 NOTE — TELEPHONE ENCOUNTER
From: Concepción Sim  To: Alejandra Gallo MD  Sent: 8/27/2020 11:11 AM CDT  Subject: Non-Urgent Brando Stone - I called to schedule an appointment with you regarding a sore throat and head cold.  I was told due to Covid like symptoms

## 2020-08-27 NOTE — TELEPHONE ENCOUNTER
Pt had fever of 100 yesterday and sore throat. No longer has fever.     Hx of Sinus/allergy    Please advise

## 2020-08-28 ENCOUNTER — APPOINTMENT (OUTPATIENT)
Dept: LAB | Age: 40
End: 2020-08-28
Attending: FAMILY MEDICINE
Payer: COMMERCIAL

## 2020-08-28 DIAGNOSIS — J02.9 SORE THROAT: ICD-10-CM

## 2020-08-30 LAB — SARS-COV-2 RNA RESP QL NAA+PROBE: NOT DETECTED

## 2020-08-31 DIAGNOSIS — M79.89 SWELLING OF LEFT LOWER EXTREMITY: ICD-10-CM

## 2020-08-31 RX ORDER — SPIRONOLACTONE 25 MG/1
TABLET ORAL
Qty: 90 TABLET | Refills: 1 | Status: SHIPPED | OUTPATIENT
Start: 2020-08-31 | End: 2021-02-01

## 2020-08-31 RX ORDER — ESCITALOPRAM OXALATE 10 MG/1
10 TABLET ORAL DAILY
Qty: 90 TABLET | Refills: 0 | Status: SHIPPED | OUTPATIENT
Start: 2020-08-31 | End: 2020-11-30

## 2020-08-31 RX ORDER — ESCITALOPRAM OXALATE 5 MG/1
5 TABLET ORAL DAILY
Qty: 90 TABLET | Refills: 0 | Status: SHIPPED | OUTPATIENT
Start: 2020-08-31 | End: 2021-06-07

## 2020-09-21 ENCOUNTER — HOSPITAL ENCOUNTER (OUTPATIENT)
Age: 40
Discharge: HOME | End: 2020-09-21
Payer: COMMERCIAL

## 2020-09-21 VITALS
OXYGEN SATURATION: 98 % | SYSTOLIC BLOOD PRESSURE: 151 MMHG | RESPIRATION RATE: 16 BRPM | HEART RATE: 78 BPM | DIASTOLIC BLOOD PRESSURE: 104 MMHG | TEMPERATURE: 98 F

## 2020-09-21 DIAGNOSIS — J02.9 PHARYNGITIS, UNSPECIFIED ETIOLOGY: Primary | ICD-10-CM

## 2020-09-21 PROCEDURE — 99204 OFFICE O/P NEW MOD 45 MIN: CPT

## 2020-09-21 PROCEDURE — 99213 OFFICE O/P EST LOW 20 MIN: CPT

## 2020-09-21 RX ORDER — CYANOCOBALAMIN 1000 UG/ML
INJECTION INTRAMUSCULAR; SUBCUTANEOUS
Qty: 10 ML | Refills: 1 | OUTPATIENT
Start: 2020-09-21

## 2020-09-21 RX ORDER — PREDNISONE 20 MG/1
40 TABLET ORAL DAILY
Qty: 6 TABLET | Refills: 0 | Status: SHIPPED | OUTPATIENT
Start: 2020-09-21 | End: 2020-09-24

## 2020-09-21 NOTE — ED PROVIDER NOTES
Patient Seen in: THE [facility] [place]      History   Patient presents with:  Sinus Problem: My primary physicians office would not see me today and referred me to the immediate care for sinus congestion, sore throat and ear pain.  Petra Schwarz Performed by Hillary Wells MD at [address] [place] [address]; W/ENDOCERVICAL CURETTAGE  6-2012    cervicitis   • DILATION/CURETTAGE,DIAGNOSTIC  2013    removal benign polyps   • ENDOMETRIAL ABLATION  10/21/2013 16   LMP 06/04/2014   SpO2 98%         Physical Exam    Vital signs and nursing notes reviewed    General Appearance: alert and oriented x 4, no acute distress  Head: Sinuses are nontender, without overlying erythema or edema  Eyes:  pupils equal and round Impression:  Pharyngitis, unspecified etiology  (primary encounter diagnosis)    Disposition:  Discharge  9/21/2020 10:54 am    Follow-up:  Timothy Ocampo, [phone]    In 1 week  As needed          Medications Pres

## 2020-09-24 ENCOUNTER — PATIENT MESSAGE (OUTPATIENT)
Dept: FAMILY MEDICINE CLINIC | Facility: CLINIC | Age: 40
End: 2020-09-24

## 2020-09-25 RX ORDER — AZITHROMYCIN 250 MG/1
TABLET, FILM COATED ORAL
Qty: 6 TABLET | Refills: 0 | Status: SHIPPED | OUTPATIENT
Start: 2020-09-25 | End: 2020-09-30

## 2020-09-25 RX ORDER — MONTELUKAST SODIUM 10 MG/1
10 TABLET ORAL DAILY
Qty: 30 TABLET | Refills: 3 | Status: SHIPPED | OUTPATIENT
Start: 2020-09-25 | End: 2020-12-17

## 2020-09-25 NOTE — TELEPHONE ENCOUNTER
Patient reporting PND, HA, and throat pain since mid August. She did test negative for COVID. IC treated her with prednisone for viral sinus infection. She is still having symptoms. Patient wondering if she should be seen by you? ENT? Please advise, thanks.

## 2020-09-25 NOTE — TELEPHONE ENCOUNTER
From: George Dobson  To:  Mateus Dooley MD  Sent: 9/24/2020 8:01 PM CDT  Subject: Visit Carmenza Cruz been battling some sinus issues with post nasal drip and right side throat pain since mid August. On 8/28/2020 I testjoaquin

## 2020-10-20 DIAGNOSIS — G47.09 OTHER INSOMNIA: ICD-10-CM

## 2020-10-21 RX ORDER — ESZOPICLONE 2 MG/1
2 TABLET, FILM COATED ORAL NIGHTLY PRN
Qty: 30 TABLET | Refills: 0 | Status: SHIPPED | OUTPATIENT
Start: 2020-10-21 | End: 2021-01-07

## 2020-10-23 ENCOUNTER — HOSPITAL ENCOUNTER (OUTPATIENT)
Age: 40
Discharge: HOME | End: 2020-10-23
Payer: COMMERCIAL

## 2020-10-23 VITALS
DIASTOLIC BLOOD PRESSURE: 92 MMHG | HEART RATE: 74 BPM | OXYGEN SATURATION: 97 % | RESPIRATION RATE: 16 BRPM | SYSTOLIC BLOOD PRESSURE: 150 MMHG | TEMPERATURE: 98 F

## 2020-10-23 DIAGNOSIS — J06.9 VIRAL URI WITH COUGH: Primary | ICD-10-CM

## 2020-10-23 PROCEDURE — 99213 OFFICE O/P EST LOW 20 MIN: CPT

## 2020-10-23 NOTE — ED PROVIDER NOTES
Patient Seen in: [facility] [place]      History   Patient presents with:  Cough    Stated Complaint: COVID EXPOSURE    HPI    CHIEF COMPLAINT: Postnasal drip, nonproductive cough, Covid exposure     HISTORY OF PRESENT ILLNESS: Patient is a 40-ye Performed by Derik Craven MD at [address] [place] [address]; W/ENDOCERVICAL CURETTAGE  6-2012    cervicitis   • DILATION/CURETTAGE,DIAGNOSTIC  2013    removal benign polyps   • ENDOMETRIAL ABLATION  10/21/2013 and nursing notes reviewed  General Appearance: Patient is alert and oriented x4 in no acute distress  Head: normocephalic, atraumatic  Eyes: pupils equal and round, reactive to light.  No pallor or injection, no sclera icterus  Ears: Bilateral TMs and [place]

## 2020-10-23 NOTE — ED INITIAL ASSESSMENT (HPI)
Patient presents with cc of cough x 4 days productive of clear sputum. No fever noted. +PND and s/p sinusitis. Had + covid exposure over the weekend. Works in healthcare

## 2020-11-30 RX ORDER — ESCITALOPRAM OXALATE 10 MG/1
10 TABLET ORAL DAILY
Qty: 90 TABLET | Refills: 0 | Status: SHIPPED | OUTPATIENT
Start: 2020-11-30 | End: 2021-03-04

## 2020-12-17 RX ORDER — MONTELUKAST SODIUM 10 MG/1
TABLET ORAL
Qty: 90 TABLET | Refills: 1 | Status: SHIPPED | OUTPATIENT
Start: 2020-12-17 | End: 2021-08-23

## 2021-01-07 DIAGNOSIS — G47.09 OTHER INSOMNIA: ICD-10-CM

## 2021-01-08 RX ORDER — ESZOPICLONE 2 MG/1
2 TABLET, FILM COATED ORAL NIGHTLY PRN
Qty: 30 TABLET | Refills: 0 | Status: SHIPPED | OUTPATIENT
Start: 2021-01-08 | End: 2021-07-19

## 2021-01-30 DIAGNOSIS — M79.89 SWELLING OF LEFT LOWER EXTREMITY: ICD-10-CM

## 2021-02-01 RX ORDER — SPIRONOLACTONE 25 MG/1
TABLET ORAL
Qty: 90 TABLET | Refills: 1 | Status: SHIPPED | OUTPATIENT
Start: 2021-02-01 | End: 2021-03-08

## 2021-02-01 NOTE — TELEPHONE ENCOUNTER
Medication(s) to Refill:   Requested Prescriptions     Pending Prescriptions Disp Refills   • SPIRONOLACTONE 25 MG Oral Tab [Pharmacy Med Name: SPIRONOLACTONE 25 MG TABLET] 90 tablet 1     Sig: TAKE 1 TABLET BY MOUTH EVERY DAY         Reason for Medication

## 2021-02-08 ENCOUNTER — LAB ENCOUNTER (OUTPATIENT)
Dept: LAB | Age: 41
End: 2021-02-08
Attending: INTERNAL MEDICINE
Payer: COMMERCIAL

## 2021-02-08 DIAGNOSIS — E83.110 HEREDITARY HEMOCHROMATOSIS (HCC): ICD-10-CM

## 2021-02-08 LAB
BASOPHILS # BLD AUTO: 0.05 X10(3) UL (ref 0–0.2)
BASOPHILS NFR BLD AUTO: 0.7 %
DEPRECATED HBV CORE AB SER IA-ACNC: 152 NG/ML
DEPRECATED RDW RBC AUTO: 41.6 FL (ref 35.1–46.3)
EOSINOPHIL # BLD AUTO: 0.1 X10(3) UL (ref 0–0.7)
EOSINOPHIL NFR BLD AUTO: 1.5 %
ERYTHROCYTE [DISTWIDTH] IN BLOOD BY AUTOMATED COUNT: 11.6 % (ref 11–15)
HCT VFR BLD AUTO: 42.9 %
HGB BLD-MCNC: 15 G/DL
IMM GRANULOCYTES # BLD AUTO: 0.02 X10(3) UL (ref 0–1)
IMM GRANULOCYTES NFR BLD: 0.3 %
IRON SATURATION: 75 %
IRON SERPL-MCNC: 214 UG/DL
LYMPHOCYTES # BLD AUTO: 2.61 X10(3) UL (ref 1–4)
LYMPHOCYTES NFR BLD AUTO: 38.1 %
MCH RBC QN AUTO: 34.2 PG (ref 26–34)
MCHC RBC AUTO-ENTMCNC: 35 G/DL (ref 31–37)
MCV RBC AUTO: 97.7 FL
MONOCYTES # BLD AUTO: 0.39 X10(3) UL (ref 0.1–1)
MONOCYTES NFR BLD AUTO: 5.7 %
NEUTROPHILS # BLD AUTO: 3.68 X10 (3) UL (ref 1.5–7.7)
NEUTROPHILS # BLD AUTO: 3.68 X10(3) UL (ref 1.5–7.7)
NEUTROPHILS NFR BLD AUTO: 53.7 %
PLATELET # BLD AUTO: 305 10(3)UL (ref 150–450)
RBC # BLD AUTO: 4.39 X10(6)UL
TOTAL IRON BINDING CAPACITY: 286 UG/DL (ref 240–450)
TRANSFERRIN SERPL-MCNC: 192 MG/DL (ref 200–360)
WBC # BLD AUTO: 6.9 X10(3) UL (ref 4–11)

## 2021-02-08 PROCEDURE — 36415 COLL VENOUS BLD VENIPUNCTURE: CPT

## 2021-02-08 PROCEDURE — 83540 ASSAY OF IRON: CPT

## 2021-02-08 PROCEDURE — 83550 IRON BINDING TEST: CPT

## 2021-02-08 PROCEDURE — 82728 ASSAY OF FERRITIN: CPT

## 2021-02-08 PROCEDURE — 85025 COMPLETE CBC W/AUTO DIFF WBC: CPT

## 2021-02-12 ENCOUNTER — OFFICE VISIT (OUTPATIENT)
Dept: HEMATOLOGY/ONCOLOGY | Facility: HOSPITAL | Age: 41
End: 2021-02-12
Attending: INTERNAL MEDICINE
Payer: COMMERCIAL

## 2021-02-12 VITALS
WEIGHT: 188.63 LBS | HEART RATE: 85 BPM | RESPIRATION RATE: 18 BRPM | OXYGEN SATURATION: 97 % | TEMPERATURE: 97 F | SYSTOLIC BLOOD PRESSURE: 135 MMHG | BODY MASS INDEX: 32 KG/M2 | DIASTOLIC BLOOD PRESSURE: 91 MMHG

## 2021-02-12 DIAGNOSIS — E83.110 HEREDITARY HEMOCHROMATOSIS (HCC): Primary | ICD-10-CM

## 2021-02-12 PROCEDURE — 99214 OFFICE O/P EST MOD 30 MIN: CPT | Performed by: INTERNAL MEDICINE

## 2021-02-12 NOTE — PROGRESS NOTES
Cancer Center Progress Note  Patient Name: Ann Marie Bruno   YOB: 1980   Medical Record Number: [id number]     Attending Physician: Gabriella Morin M.D. Date of Visit: 2/12/2021      Chief Complaint:  Patient presents with:   Trav Brooke MD at [facility]   • COLPOSCOPY, CERVIX W/UPPER ADJACENT VAGINA; W/ENDOCERVICAL CURETTAGE  6-2012    cervicitis   • DILATION/CURETTAGE,DIAGNOSTIC  2013    removal benign polyps   • ENDOMETRIAL ABLATION  10/21/2013   • ENDOMETRIAL BX CONJUNCT Not on file    Social Needs      Financial resource strain: Not on file      Food insecurity        Worry: Not on file        Inability: Not on file      Transportation needs        Medical: Not on file        Non-medical: Not on file    Tobacco Use 2 MG Oral Tab, Take 1 tablet (2 mg total) by mouth nightly as needed. , Disp: 30 tablet, Rfl: 0  •  MONTELUKAST SODIUM 10 MG Oral Tab, TAKE 1 TABLET BY MOUTH EVERY DAY, Disp: 90 tablet, Rfl: 1  •  ESCITALOPRAM 10 MG Oral Tab, TAKE 1 TABLET (10 MG TOTAL) BY pain, cough or hemoptysis. Cardiovascular No anginal chest pain, palpitations or orthopnea. Gastrointestinal No nausea, vomiting, diarrhea, GI bleeding, or constipation. NL appetite.    Genitorurinary  No hematuria, dysuria, abnormal bleeding, or incont 50 % 75 (H)   FERRITIN Latest Ref Range: 12.0 - 240.0 ng/mL 152.0   WBC Latest Ref Range: 4.0 - 11.0 x10(3) uL 6.9   Hemoglobin Latest Ref Range: 12.0 - 16.0 g/dL 15.0   Hematocrit Latest Ref Range: 35.0 - 48.0 % 42.9   Platelet Count Latest Ref Range: 150 treatment was explained to the patient and the family. Electronically Signed by: MARCEL Angeles [facility]

## 2021-02-18 ENCOUNTER — NURSE ONLY (OUTPATIENT)
Dept: HEMATOLOGY/ONCOLOGY | Facility: HOSPITAL | Age: 41
End: 2021-02-18
Attending: INTERNAL MEDICINE
Payer: COMMERCIAL

## 2021-02-18 VITALS
TEMPERATURE: 98 F | WEIGHT: 190.63 LBS | DIASTOLIC BLOOD PRESSURE: 93 MMHG | RESPIRATION RATE: 16 BRPM | BODY MASS INDEX: 32.15 KG/M2 | HEART RATE: 87 BPM | SYSTOLIC BLOOD PRESSURE: 129 MMHG | OXYGEN SATURATION: 96 % | HEIGHT: 64.49 IN

## 2021-02-18 LAB — HEMOGLOBIN POC: 15.4 G/DL

## 2021-02-18 PROCEDURE — 36416 COLLJ CAPILLARY BLOOD SPEC: CPT

## 2021-02-18 PROCEDURE — 99195 PHLEBOTOMY: CPT

## 2021-02-18 PROCEDURE — 85018 HEMOGLOBIN: CPT

## 2021-02-18 PROCEDURE — 36415 COLL VENOUS BLD VENIPUNCTURE: CPT

## 2021-02-18 NOTE — PROGRESS NOTES
Patient Name: Adithya Garnica  : 1980   Medical Record #: [id number]      Therapeutic Donor Physical and Record of Collection    Order:  Therapeutic Phlebotomy  Order Date:  21 Expiration Date:  22  Frequency:  Monthly  Parameters:   The

## 2021-02-18 NOTE — PROGRESS NOTES
Patient Name: Mario Gillespie  : 1980   Medical Record #: [id number]      Therapeutic Donor History    Donor History    When was your last healthy meal? Mid afternoon    When was the last time you did any strenuous activities today? none    Are y Syringe/Needle, Disp, (BD ECLIPSE SYRINGE) 25G X 1\" 3 ML Does not apply Misc Use as directed with b12 23 each 0   • ALPRAZolam 0.25 MG Oral Tab Take 1 tablet (0.25 mg total) by mouth daily as needed for Anxiety.  20 tablet 0   • Phentermine HCl 15 MG Oral

## 2021-03-04 RX ORDER — ESCITALOPRAM OXALATE 10 MG/1
10 TABLET ORAL DAILY
Qty: 90 TABLET | Refills: 0 | Status: SHIPPED | OUTPATIENT
Start: 2021-03-04 | End: 2021-06-04

## 2021-03-06 DIAGNOSIS — M79.89 SWELLING OF LEFT LOWER EXTREMITY: ICD-10-CM

## 2021-03-08 RX ORDER — SPIRONOLACTONE 25 MG/1
TABLET ORAL
Qty: 90 TABLET | Refills: 1 | Status: SHIPPED | OUTPATIENT
Start: 2021-03-08 | End: 2021-06-07 | Stop reason: ALTCHOICE

## 2021-03-12 ENCOUNTER — APPOINTMENT (OUTPATIENT)
Dept: HEMATOLOGY/ONCOLOGY | Facility: HOSPITAL | Age: 41
End: 2021-03-12
Attending: INTERNAL MEDICINE
Payer: COMMERCIAL

## 2021-04-15 ENCOUNTER — NURSE ONLY (OUTPATIENT)
Dept: HEMATOLOGY/ONCOLOGY | Facility: HOSPITAL | Age: 41
End: 2021-04-15
Attending: INTERNAL MEDICINE
Payer: COMMERCIAL

## 2021-04-15 VITALS
WEIGHT: 187 LBS | RESPIRATION RATE: 16 BRPM | OXYGEN SATURATION: 98 % | BODY MASS INDEX: 31.54 KG/M2 | HEART RATE: 70 BPM | TEMPERATURE: 98 F | DIASTOLIC BLOOD PRESSURE: 100 MMHG | HEIGHT: 64.49 IN | SYSTOLIC BLOOD PRESSURE: 161 MMHG

## 2021-04-15 PROCEDURE — 85018 HEMOGLOBIN: CPT

## 2021-04-15 PROCEDURE — 36416 COLLJ CAPILLARY BLOOD SPEC: CPT

## 2021-04-15 NOTE — PROGRESS NOTES
Pt here today for a Therapeutic Phlebotomy. Pt vital were with in normal limit. Per Dr Beryl Lyman order do not perform phlebotomy if HGB is 14 or greater. Pt HGB done. Pt HGB is 13.9. Pt advised she will not need phlebotomy done today.  Pt understood

## 2021-05-14 ENCOUNTER — APPOINTMENT (OUTPATIENT)
Dept: HEMATOLOGY/ONCOLOGY | Facility: HOSPITAL | Age: 41
End: 2021-05-14
Attending: INTERNAL MEDICINE
Payer: COMMERCIAL

## 2021-05-14 VITALS
WEIGHT: 191 LBS | RESPIRATION RATE: 18 BRPM | HEART RATE: 74 BPM | OXYGEN SATURATION: 97 % | HEIGHT: 64.49 IN | TEMPERATURE: 98 F | SYSTOLIC BLOOD PRESSURE: 146 MMHG | DIASTOLIC BLOOD PRESSURE: 105 MMHG | BODY MASS INDEX: 32.21 KG/M2

## 2021-05-14 DIAGNOSIS — E83.110 HEREDITARY HEMOCHROMATOSIS (HCC): ICD-10-CM

## 2021-05-14 PROCEDURE — 99213 OFFICE O/P EST LOW 20 MIN: CPT | Performed by: INTERNAL MEDICINE

## 2021-05-14 NOTE — PROGRESS NOTES
Pt here for 3mth MD f/u visit. Denies any complaints today. Labs drawn.      After MD visit, pt sent to lab for phleb

## 2021-05-15 NOTE — PROGRESS NOTES
Cancer Center Progress Note  Patient Name: Luis Trotter   YOB: 1980   Medical Record Number: [id number]     Attending Physician: Alexandre Fajardo M.D. Date of Visit: 5/14/21      Chief Complaint:  Patient presents with:   Follow [facility]   • COLPOSCOPY, CERVIX W/UPPER ADJACENT VAGINA; W/ENDOCERVICAL CURETTAGE  6-2012    cervicitis   • DILATION/CURETTAGE,DIAGNOSTIC  2013    removal benign polyps   • ENDOMETRIAL ABLATION  10/21/2013   • ENDOMETRIAL BX CONJUNCT W/COLPOSCO Occupational Exposure: Not Asked        Hobby Hazards: Not Asked        Sleep Concern: Not Asked        Back Care: Not Asked        Bike Helmet: Not Asked        Self-Exams: Not Asked    Social History Narrative      Not on file    Social Determinants of H Syringe/Needle, Disp, (BD ECLIPSE SYRINGE) 25G X 1\" 3 ML Does not apply Misc, Use as directed with b12, Disp: 23 each, Rfl: 0  •  ALPRAZolam 0.25 MG Oral Tab, Take 1 tablet (0.25 mg total) by mouth daily as needed for Anxiety. , Disp: 20 tablet, Rfl: 0  • nourished. Well developed. Eyes Normal - Conjunctivae and sclerae are clear and without icterus. Pupils are reactive and equal.   Hematologic/Lymphatic Normal - No petechiae or purpura.   No tender or palpable lymph nodes in the cervical, supraclavicular, Latest Ref Range: 1.00 - 4.00 x10(3) uL 3.32   Monocytes Absolute Latest Ref Range: 0.10 - 1.00 x10(3) uL 0.48   Eosinophils Absolute Latest Ref Range: 0.00 - 0.70 x10(3) uL 0.11   Basophils Absolute Latest Ref Range: 0.00 - 0.20 x10(3) uL 0.08   Immature

## 2021-05-17 ENCOUNTER — NURSE ONLY (OUTPATIENT)
Dept: HEMATOLOGY/ONCOLOGY | Facility: HOSPITAL | Age: 41
End: 2021-05-17
Attending: INTERNAL MEDICINE
Payer: COMMERCIAL

## 2021-05-17 VITALS
TEMPERATURE: 97 F | WEIGHT: 192 LBS | BODY MASS INDEX: 32.38 KG/M2 | DIASTOLIC BLOOD PRESSURE: 88 MMHG | OXYGEN SATURATION: 96 % | HEART RATE: 65 BPM | HEIGHT: 64.49 IN | SYSTOLIC BLOOD PRESSURE: 129 MMHG | RESPIRATION RATE: 16 BRPM

## 2021-05-17 PROCEDURE — 99195 PHLEBOTOMY: CPT

## 2021-05-17 NOTE — PROGRESS NOTES
Patient Name: Tanner Craven  : 1980   Medical Record #: [id number]      Therapeutic Donor History    Donor History    When was your last healthy meal? Lunch time    When was the last time you did any strenuous activities today? none    Are you Disp, (BD ECLIPSE SYRINGE) 25G X 1\" 3 ML Does not apply Misc Use as directed with b12 23 each 0   • ALPRAZolam 0.25 MG Oral Tab Take 1 tablet (0.25 mg total) by mouth daily as needed for Anxiety.  20 tablet 0   • Phentermine HCl 15 MG Oral Cap Take 1 capsu donor's physical assessment meets acceptance criteria parameters. This was completed by Ayla Gracia.     If the patient does not meet the criteria, please explain below:      Collection Information    Arm used:  left    Lot number of collection bag:  [id number]

## 2021-06-01 ENCOUNTER — TELEPHONE (OUTPATIENT)
Dept: FAMILY MEDICINE CLINIC | Facility: CLINIC | Age: 41
End: 2021-06-01

## 2021-06-01 NOTE — TELEPHONE ENCOUNTER
Below message received from schedule request:    Please contact patient to triage SOB/chest pain   ----- Message -----   From: Leesa Manzo   Sent: 5/31/2021   9:38 AM CDT   To: [facility] Front Office   Subject: Appointment scheduled from [place] [id number]

## 2021-06-02 NOTE — TELEPHONE ENCOUNTER
Called patient and spoke with her. She states that she has been having on and off shortness of breath for the last 4 to 5 months. Patient states that she will also get occasional chest/shoulder pain.   Patient states that it seems to be worse with walking

## 2021-06-04 RX ORDER — ESCITALOPRAM OXALATE 10 MG/1
10 TABLET ORAL DAILY
Qty: 30 TABLET | Refills: 0 | Status: SHIPPED | OUTPATIENT
Start: 2021-06-04 | End: 2021-06-07

## 2021-06-07 ENCOUNTER — OFFICE VISIT (OUTPATIENT)
Dept: FAMILY MEDICINE CLINIC | Facility: CLINIC | Age: 41
End: 2021-06-07
Payer: COMMERCIAL

## 2021-06-07 DIAGNOSIS — R00.2 HEART PALPITATIONS: ICD-10-CM

## 2021-06-07 DIAGNOSIS — G47.09 OTHER INSOMNIA: ICD-10-CM

## 2021-06-07 DIAGNOSIS — R61 HYPERHIDROSIS: ICD-10-CM

## 2021-06-07 DIAGNOSIS — Z00.00 ANNUAL PHYSICAL EXAM: Primary | ICD-10-CM

## 2021-06-07 DIAGNOSIS — Z00.00 LABORATORY EXAMINATION ORDERED AS PART OF A ROUTINE GENERAL MEDICAL EXAMINATION: ICD-10-CM

## 2021-06-07 DIAGNOSIS — F41.1 GAD (GENERALIZED ANXIETY DISORDER): ICD-10-CM

## 2021-06-07 DIAGNOSIS — E78.2 MIXED HYPERLIPIDEMIA: ICD-10-CM

## 2021-06-07 DIAGNOSIS — Z12.11 COLON CANCER SCREENING: ICD-10-CM

## 2021-06-07 PROCEDURE — 3008F BODY MASS INDEX DOCD: CPT | Performed by: FAMILY MEDICINE

## 2021-06-07 PROCEDURE — 3079F DIAST BP 80-89 MM HG: CPT | Performed by: FAMILY MEDICINE

## 2021-06-07 PROCEDURE — 99396 PREV VISIT EST AGE 40-64: CPT | Performed by: FAMILY MEDICINE

## 2021-06-07 PROCEDURE — 3075F SYST BP GE 130 - 139MM HG: CPT | Performed by: FAMILY MEDICINE

## 2021-06-07 RX ORDER — LECITHIN 1200 MG
1 CAPSULE ORAL 2 TIMES DAILY PRN
Qty: 60 CAPSULE | Refills: 2 | Status: SHIPPED | OUTPATIENT
Start: 2021-06-07

## 2021-06-07 RX ORDER — ESCITALOPRAM OXALATE 10 MG/1
10 TABLET ORAL DAILY
Qty: 90 TABLET | Refills: 1 | Status: SHIPPED | OUTPATIENT
Start: 2021-06-07 | End: 2021-07-19

## 2021-06-07 RX ORDER — ALPRAZOLAM 0.25 MG/1
0.25 TABLET ORAL
Qty: 30 TABLET | Refills: 1 | Status: SHIPPED | OUTPATIENT
Start: 2021-06-07 | End: 2021-12-05

## 2021-06-07 RX ORDER — ESCITALOPRAM OXALATE 5 MG/1
5 TABLET ORAL DAILY
Qty: 90 TABLET | Refills: 1 | Status: SHIPPED | OUTPATIENT
Start: 2021-06-07 | End: 2021-07-19

## 2021-06-07 RX ORDER — CHLORTHALIDONE 25 MG/1
25 TABLET ORAL DAILY
Qty: 30 TABLET | Refills: 3 | Status: SHIPPED | OUTPATIENT
Start: 2021-06-07

## 2021-06-08 VITALS
RESPIRATION RATE: 23 BRPM | HEART RATE: 107 BPM | TEMPERATURE: 97 F | OXYGEN SATURATION: 98 % | WEIGHT: 189 LBS | DIASTOLIC BLOOD PRESSURE: 88 MMHG | HEIGHT: 64 IN | BODY MASS INDEX: 32.27 KG/M2 | SYSTOLIC BLOOD PRESSURE: 132 MMHG

## 2021-06-08 NOTE — PROGRESS NOTES
HPI/Subjective:   Mukesh Sullivan is a 35year old female who presents for Well Adult (no pap smear ) and Shortness Of Breath     Presenting for annual physical.     Patient has history of MAKAYLA-stable, but anxiety slightly worse, increase HR and palpitatio total) by mouth every 8 (eight) hours as needed for Nausea. 20 tablet 1   • Ketoconazole 2 % External Shampoo Apply to the scalp few times weekly 120 mL 0   • Acetaminophen (ACETAMINOPHEN EXTRA STRENGTH) 500 MG Oral Cap Take 1,000 mg by mouth one time. along with 10mg tablet for a total of 15mg daily  Dispense: 90 tablet; Refill: 1  - escitalopram 10 MG Oral Tab; Take 1 tablet (10 mg total) by mouth daily. Take along with 5mg tablet for a total of 15mg daily  Dispense: 90 tablet; Refill: 1    4.  Alexandria Frames

## 2021-07-19 DIAGNOSIS — G47.09 OTHER INSOMNIA: ICD-10-CM

## 2021-07-19 DIAGNOSIS — F41.1 GAD (GENERALIZED ANXIETY DISORDER): ICD-10-CM

## 2021-07-20 RX ORDER — ESCITALOPRAM OXALATE 10 MG/1
10 TABLET ORAL DAILY
Qty: 90 TABLET | Refills: 1 | Status: SHIPPED | OUTPATIENT
Start: 2021-07-20 | End: 2021-12-08 | Stop reason: ALTCHOICE

## 2021-07-20 RX ORDER — ESCITALOPRAM OXALATE 5 MG/1
5 TABLET ORAL DAILY
Qty: 90 TABLET | Refills: 1 | Status: SHIPPED | OUTPATIENT
Start: 2021-07-20 | End: 2021-12-08 | Stop reason: ALTCHOICE

## 2021-07-20 RX ORDER — ESZOPICLONE 2 MG/1
2 TABLET, FILM COATED ORAL NIGHTLY PRN
Qty: 30 TABLET | Refills: 0 | Status: SHIPPED | OUTPATIENT
Start: 2021-07-20

## 2021-07-20 NOTE — TELEPHONE ENCOUNTER
Medication(s) to Refill:   Requested Prescriptions     Pending Prescriptions Disp Refills   • escitalopram 5 MG Oral Tab 90 tablet 1     Sig: Take 1 tablet (5 mg total) by mouth daily.  Take along with 10mg tablet for a total of 15mg daily   • escitalopram

## 2021-08-17 NOTE — TELEPHONE ENCOUNTER
8/17/2021         RE: Elsi Krause  [address]  [facility] [address]        Dear Colleague,    Thank you for referring your patient, Elsi Krause, to the [facility]. Please see a copy of my visit note below.    Visit Date: 08/17/2021    REFERRING PHYSICIAN:  Nurse practitioner, Arlin Garsia.    HISTORY OF PRESENT ILLNESS:  The patient is a 57-year-old  who relates about a year history of pain and paresthesia in the right hand, much more than the left.  Symptoms are not precisely localized.  She thinks there may be some loss of  strength.  There was no specific injury.    PAST MEDICAL HISTORY:  Significant for obesity, but negative for diabetes.    REVIEW OF SYSTEMS:  A 10-system review of systems is noncontributory.    FAMILY HISTORY:  Positive for carpal tunnel syndrome diagnosed for the patient's sister.    SOCIAL HISTORY:  The patient is a former smoker.  She works as a .    PHYSICAL EXAMINATION:  The patient is overweight and grossly deconditioned.  There is prominent weakness on the right for the abductors of the thumb with grossly normal strength for this muscle group. On the left she has normal strength bilaterally for the interossei, finger extensors and flexors, biceps and triceps.  Reflexes are hypoactive, but symmetric in the upper extremities.  The gait is normal.    NERVE CONDUCTION STUDIES:  Motor nerve conduction testing was performed for the right median and ulnar nerves.  The median nerves showed severe latency prolongation, conduction block and slowing at the wrist.  The ulnar study was normal.  H reflex latencies were within normal limits for both nerves.    Orthodromic mixed conduction studies were performed for the right median and ulnar nerves.  Antidromic sensory nerve conduction studies were performed for the second digital, fifth digital and radial nerves.  Waveforms were absent for the median and second digital nerves.  For the  See 2/19/20 TE other tested nerves, the latencies, amplitudes and conduction velocities were normal.    MONOPOLAR EMG NEEDLE EXAMINATION:  Monopolar needle exam was performed on the right side for the first dorsal interosseous, extensor digitorum communis, flexor carpi radialis, triceps and brachioradialis.  All of the tested muscles showed normal insertional activity.  Motor units were normal in size with normal recruitment and interference.    IMPRESSION:  The patient has severe right carpal tunnel syndrome, but otherwise looks to be neurologically healthy, the degree of conduction block is great enough that resolution of the carpal tunnel syndrome cannot be expected with nonsurgical treatment.  This was discussed with the patient.    Yury Lay MD        D: 2021   T: 2021   MT: [id number]    Name:     KWAME PIPER  MRN:      [id number]        Account:    [id number]   :      1963           Visit Date: 2021     Document: [id number]      Again, thank you for allowing me to participate in the care of your patient.        Sincerely,        Yury Lay MD

## 2021-08-21 ENCOUNTER — OFFICE VISIT (OUTPATIENT)
Dept: FAMILY MEDICINE CLINIC | Facility: CLINIC | Age: 41
End: 2021-08-21
Payer: COMMERCIAL

## 2021-08-21 VITALS
OXYGEN SATURATION: 98 % | HEIGHT: 64 IN | DIASTOLIC BLOOD PRESSURE: 90 MMHG | HEART RATE: 74 BPM | BODY MASS INDEX: 30.73 KG/M2 | SYSTOLIC BLOOD PRESSURE: 142 MMHG | TEMPERATURE: 98 F | WEIGHT: 180 LBS | RESPIRATION RATE: 20 BRPM

## 2021-08-21 DIAGNOSIS — J01.10 ACUTE FRONTAL SINUSITIS, RECURRENCE NOT SPECIFIED: Primary | ICD-10-CM

## 2021-08-21 DIAGNOSIS — Z20.822 SUSPECTED COVID-19 VIRUS INFECTION: ICD-10-CM

## 2021-08-21 PROCEDURE — 3080F DIAST BP >= 90 MM HG: CPT | Performed by: NURSE PRACTITIONER

## 2021-08-21 PROCEDURE — 3008F BODY MASS INDEX DOCD: CPT | Performed by: NURSE PRACTITIONER

## 2021-08-21 PROCEDURE — 3077F SYST BP >= 140 MM HG: CPT | Performed by: NURSE PRACTITIONER

## 2021-08-21 PROCEDURE — 99213 OFFICE O/P EST LOW 20 MIN: CPT | Performed by: NURSE PRACTITIONER

## 2021-08-21 RX ORDER — DOXYCYCLINE HYCLATE 100 MG
100 TABLET ORAL 2 TIMES DAILY
Qty: 14 TABLET | Refills: 0 | Status: SHIPPED | OUTPATIENT
Start: 2021-08-21 | End: 2021-08-28

## 2021-08-21 NOTE — PROGRESS NOTES
CHIEF COMPLAINT:   Patient presents with:  Cold: Head cold and GI symptoms - Entered by patient. x 4 days. HPI:   Kat Swift is a 35year old female who presents for cold symptoms for 1 weeks.  Symptoms have progressed into sinus congestion and few times weekly 120 mL 0   • Acetaminophen (ACETAMINOPHEN EXTRA STRENGTH) 500 MG Oral Cap Take 1,000 mg by mouth one time.           Past Medical History:   Diagnosis Date   • JACKSON favor benign 6/2012   • Allergic rhinitis    • Anxiety state    • Back prob appetite  SKIN: no rashes or abnormal skin lesions  HEENT: See HPI. LUNGS: denies shortness of breath or wheezing, See HPI  CARDIOVASCULAR: denies chest pain or palpitations   GI: denies N/V/C or abdominal pain  NEURO: + sinus headaches.   No numbness or the lining of the inside of the nose and the sinuses becomes irritated and swollen. It is also called sinusitis, or a sinus infection. Sinuses are air-filled spaces in the skull behind the face. They are kept moist and clean by a lining of mucosa.  Things throat, and sinuses. Imaging tests, such as X-rays, are often not needed. It can be hard to figure out if a sinus infection is caused by a virus or bacterium. A bacterial infection tends to last longer. Symptoms may also get better but then worsen.  Your h that don’t get better, or get worse  · New symptoms  Meet last reviewed this educational content on 6/1/2019  © 8033-0144 [employer]. All rights reserved. This information is not intended as a substitute for professional medical care.  Al

## 2021-08-21 NOTE — PATIENT INSTRUCTIONS
Understanding Acute Rhinosinusitis  Acute rhinosinusitis is when the lining of the inside of the nose and the sinuses becomes irritated and swollen. It is also called sinusitis, or a sinus infection.   Sinuses are air-filled spaces in the skull behind the symptoms and past health. He or she will look at your ears, nose, throat, and sinuses. Imaging tests, such as X-rays, are often not needed. It can be hard to figure out if a sinus infection is caused by a virus or bacterium.  A bacterial infection tends to directed by your healthcare provider  · Pain that gets worse  · Symptoms that don’t get better, or get worse  · New symptoms  Meet last reviewed this educational content on 6/1/2019  © 8912-1267 [employer]. All rights reserved.  This info

## 2021-08-23 LAB — SARS-COV-2 RNA RESP QL NAA+PROBE: NOT DETECTED

## 2021-08-23 RX ORDER — MONTELUKAST SODIUM 10 MG/1
TABLET ORAL
Qty: 90 TABLET | Refills: 1 | Status: SHIPPED | OUTPATIENT
Start: 2021-08-23 | End: 2021-12-06

## 2021-09-25 ENCOUNTER — LAB ENCOUNTER (OUTPATIENT)
Dept: LAB | Age: 41
End: 2021-09-25
Attending: FAMILY MEDICINE
Payer: COMMERCIAL

## 2021-09-25 DIAGNOSIS — E78.2 MIXED HYPERLIPIDEMIA: ICD-10-CM

## 2021-09-25 DIAGNOSIS — R61 HYPERHIDROSIS: ICD-10-CM

## 2021-09-25 LAB
CHOLEST SERPL-MCNC: 156 MG/DL (ref ?–200)
HDLC SERPL-MCNC: 40 MG/DL (ref 40–59)
LDLC SERPL CALC-MCNC: 85 MG/DL (ref ?–100)
NONHDLC SERPL-MCNC: 116 MG/DL (ref ?–130)
PATIENT FASTING Y/N/NP: YES
T4 FREE SERPL-MCNC: 1 NG/DL (ref 0.8–1.7)
TRIGL SERPL-MCNC: 183 MG/DL (ref 30–149)
TSI SER-ACNC: 1.1 MIU/ML (ref 0.36–3.74)
VLDLC SERPL CALC-MCNC: 29 MG/DL (ref 0–30)

## 2021-09-25 PROCEDURE — 84439 ASSAY OF FREE THYROXINE: CPT | Performed by: FAMILY MEDICINE

## 2021-09-25 PROCEDURE — 80061 LIPID PANEL: CPT | Performed by: FAMILY MEDICINE

## 2021-09-25 PROCEDURE — 84443 ASSAY THYROID STIM HORMONE: CPT | Performed by: FAMILY MEDICINE

## 2021-10-04 ENCOUNTER — TELEPHONE (OUTPATIENT)
Dept: FAMILY MEDICINE CLINIC | Facility: CLINIC | Age: 41
End: 2021-10-04

## 2021-10-04 DIAGNOSIS — E78.2 MIXED HYPERLIPIDEMIA: ICD-10-CM

## 2021-10-04 DIAGNOSIS — R61 HYPERHIDROSIS: Primary | ICD-10-CM

## 2021-10-04 NOTE — TELEPHONE ENCOUNTER
----- Message from Jacque Barroso MD sent at 10/2/2021  6:21 PM CDT -----  Stable, CPM, recheck in 6 months.

## 2021-10-19 ENCOUNTER — NURSE ONLY (OUTPATIENT)
Dept: HEMATOLOGY/ONCOLOGY | Facility: HOSPITAL | Age: 41
End: 2021-10-19
Attending: INTERNAL MEDICINE
Payer: COMMERCIAL

## 2021-10-19 VITALS
SYSTOLIC BLOOD PRESSURE: 150 MMHG | BODY MASS INDEX: 33.43 KG/M2 | WEIGHT: 195.81 LBS | HEART RATE: 94 BPM | RESPIRATION RATE: 16 BRPM | DIASTOLIC BLOOD PRESSURE: 103 MMHG | TEMPERATURE: 99 F | HEIGHT: 64.02 IN | OXYGEN SATURATION: 96 %

## 2021-10-19 DIAGNOSIS — E83.110 HEREDITARY HEMOCHROMATOSIS (HCC): Primary | ICD-10-CM

## 2021-10-19 PROCEDURE — 36416 COLLJ CAPILLARY BLOOD SPEC: CPT

## 2021-10-19 PROCEDURE — 85018 HEMOGLOBIN: CPT

## 2021-10-19 NOTE — PROGRESS NOTES
Pt here today for her Therapeutic Phlebotomy. Pt vitals were done. Pt bp was high. Per order do not perform phlebotomy if HGB is less than 14. Pt HGB today is 13.7. Pt did not need a therapeutic phlebotomy today.     Pt stated because she has not had one in

## 2021-10-22 ENCOUNTER — LAB ENCOUNTER (OUTPATIENT)
Dept: LAB | Facility: HOSPITAL | Age: 41
End: 2021-10-22
Attending: FAMILY MEDICINE
Payer: COMMERCIAL

## 2021-10-22 DIAGNOSIS — Z11.59 ENCOUNTER FOR SCREENING FOR OTHER VIRAL DISEASES: ICD-10-CM

## 2021-10-22 DIAGNOSIS — Z01.818 PRE-PROCEDURAL EXAMINATION: ICD-10-CM

## 2021-10-25 ENCOUNTER — TELEPHONE (OUTPATIENT)
Dept: FAMILY MEDICINE CLINIC | Facility: CLINIC | Age: 41
End: 2021-10-25

## 2021-10-25 ENCOUNTER — HOSPITAL ENCOUNTER (OUTPATIENT)
Dept: CV DIAGNOSTICS | Age: 41
Discharge: HOME | End: 2021-10-25
Attending: FAMILY MEDICINE
Payer: COMMERCIAL

## 2021-10-25 DIAGNOSIS — R00.2 HEART PALPITATIONS: ICD-10-CM

## 2021-10-25 DIAGNOSIS — R61 HYPERHIDROSIS: ICD-10-CM

## 2021-10-25 PROCEDURE — 93306 TTE W/DOPPLER COMPLETE: CPT | Performed by: FAMILY MEDICINE

## 2021-11-30 RX ORDER — HYDROXYZINE HYDROCHLORIDE 25 MG/1
25 TABLET, FILM COATED ORAL EVERY 8 HOURS PRN
Qty: 30 TABLET | Refills: 1 | Status: SHIPPED | OUTPATIENT
Start: 2021-11-30

## 2021-12-05 DIAGNOSIS — G47.09 OTHER INSOMNIA: ICD-10-CM

## 2021-12-05 RX ORDER — MONTELUKAST SODIUM 10 MG/1
10 TABLET ORAL DAILY
Qty: 90 TABLET | Refills: 1 | Status: CANCELLED | OUTPATIENT
Start: 2021-12-05

## 2021-12-07 ENCOUNTER — PATIENT MESSAGE (OUTPATIENT)
Dept: FAMILY MEDICINE CLINIC | Facility: CLINIC | Age: 41
End: 2021-12-07

## 2021-12-07 RX ORDER — MONTELUKAST SODIUM 10 MG/1
10 TABLET ORAL DAILY
Qty: 90 TABLET | Refills: 1 | Status: SHIPPED | OUTPATIENT
Start: 2021-12-07

## 2021-12-07 RX ORDER — ALPRAZOLAM 0.25 MG/1
0.25 TABLET ORAL
Qty: 30 TABLET | Refills: 1 | Status: SHIPPED | OUTPATIENT
Start: 2021-12-07

## 2021-12-07 NOTE — TELEPHONE ENCOUNTER
From: Harini Messina  To: Glenda Pearl MD  Sent: 2021 3:27 PM CST  Subject: Other    Hi Dr. Miracle Lyons,    My son Princess Ridley,  3, was admitted into [facility] after taking him into the ER yesterday for fear of him harming himself.  I would l

## 2021-12-13 NOTE — PROGRESS NOTES
Subjective:   Sahil Batista is a 35year old female who presents for Follow - Up     Presenting for follow up on MAKAYLA, acute stress reaction because of son's mental health, stable mood, but acute anxiety and panic, stable for now. No si or hi.      Histor following:    Height as of this encounter: 5' 4\" (1.626 m). Weight as of this encounter: 195 lb (88.5 kg). Physical Exam  Vitals and nursing note reviewed. Constitutional:       Appearance: She is well-developed.    Cardiovascular:      Rate and Rhyt

## 2021-12-21 ENCOUNTER — NURSE ONLY (OUTPATIENT)
Dept: HEMATOLOGY/ONCOLOGY | Facility: HOSPITAL | Age: 41
End: 2021-12-21
Attending: INTERNAL MEDICINE
Payer: COMMERCIAL

## 2021-12-21 VITALS
RESPIRATION RATE: 16 BRPM | HEART RATE: 94 BPM | DIASTOLIC BLOOD PRESSURE: 102 MMHG | TEMPERATURE: 98 F | OXYGEN SATURATION: 95 % | HEIGHT: 64.02 IN | SYSTOLIC BLOOD PRESSURE: 138 MMHG | WEIGHT: 198 LBS | BODY MASS INDEX: 33.8 KG/M2

## 2021-12-21 PROCEDURE — 36416 COLLJ CAPILLARY BLOOD SPEC: CPT

## 2021-12-21 PROCEDURE — 85018 HEMOGLOBIN: CPT

## 2021-12-21 NOTE — PROGRESS NOTES
Pt here today for her Therapeutic Phlebotomy. Pt vitals were done. Pt blood pressure is high. Per order do not perform phlebotomy if HGB is less than 14. Pt HGB today is 12.8. Pt did not need a therapeutic phlebotomy today.

## 2022-04-03 ENCOUNTER — OFFICE VISIT (OUTPATIENT)
Dept: FAMILY MEDICINE CLINIC | Facility: CLINIC | Age: 42
End: 2022-04-03
Payer: COMMERCIAL

## 2022-04-03 VITALS
WEIGHT: 195 LBS | HEART RATE: 85 BPM | OXYGEN SATURATION: 98 % | RESPIRATION RATE: 14 BRPM | HEIGHT: 64.5 IN | DIASTOLIC BLOOD PRESSURE: 70 MMHG | BODY MASS INDEX: 32.89 KG/M2 | TEMPERATURE: 97 F | SYSTOLIC BLOOD PRESSURE: 120 MMHG

## 2022-04-03 DIAGNOSIS — H65.193 OTHER NON-RECURRENT ACUTE NONSUPPURATIVE OTITIS MEDIA OF BOTH EARS: Primary | ICD-10-CM

## 2022-04-03 DIAGNOSIS — J06.9 VIRAL URI: ICD-10-CM

## 2022-04-03 PROCEDURE — 3078F DIAST BP <80 MM HG: CPT | Performed by: NURSE PRACTITIONER

## 2022-04-03 PROCEDURE — 99213 OFFICE O/P EST LOW 20 MIN: CPT | Performed by: NURSE PRACTITIONER

## 2022-04-03 PROCEDURE — 3074F SYST BP LT 130 MM HG: CPT | Performed by: NURSE PRACTITIONER

## 2022-04-03 PROCEDURE — 3008F BODY MASS INDEX DOCD: CPT | Performed by: NURSE PRACTITIONER

## 2022-04-03 RX ORDER — AMOXICILLIN 875 MG/1
875 TABLET, COATED ORAL 2 TIMES DAILY
Qty: 20 TABLET | Refills: 0 | Status: SHIPPED | OUTPATIENT
Start: 2022-04-03 | End: 2022-04-13

## 2022-04-03 RX ORDER — FLUTICASONE PROPIONATE 50 MCG
2 SPRAY, SUSPENSION (ML) NASAL DAILY
Qty: 1 EACH | Refills: 0 | Status: SHIPPED | OUTPATIENT
Start: 2022-04-03 | End: 2022-04-17

## 2022-04-08 ENCOUNTER — TELEPHONE (OUTPATIENT)
Dept: FAMILY MEDICINE CLINIC | Facility: CLINIC | Age: 42
End: 2022-04-08

## 2022-04-20 ENCOUNTER — OFFICE VISIT (OUTPATIENT)
Dept: FAMILY MEDICINE CLINIC | Facility: CLINIC | Age: 42
End: 2022-04-20
Payer: COMMERCIAL

## 2022-04-20 ENCOUNTER — PATIENT MESSAGE (OUTPATIENT)
Dept: FAMILY MEDICINE CLINIC | Facility: CLINIC | Age: 42
End: 2022-04-20

## 2022-04-20 VITALS
WEIGHT: 195 LBS | DIASTOLIC BLOOD PRESSURE: 102 MMHG | BODY MASS INDEX: 33.29 KG/M2 | SYSTOLIC BLOOD PRESSURE: 160 MMHG | HEART RATE: 77 BPM | OXYGEN SATURATION: 97 % | TEMPERATURE: 98 F | RESPIRATION RATE: 18 BRPM | HEIGHT: 64 IN

## 2022-04-20 DIAGNOSIS — H65.02 ACUTE SEROUS OTITIS MEDIA OF LEFT EAR, RECURRENCE NOT SPECIFIED: Primary | ICD-10-CM

## 2022-04-20 PROCEDURE — 3008F BODY MASS INDEX DOCD: CPT | Performed by: NURSE PRACTITIONER

## 2022-04-20 PROCEDURE — 3077F SYST BP >= 140 MM HG: CPT | Performed by: NURSE PRACTITIONER

## 2022-04-20 PROCEDURE — 99213 OFFICE O/P EST LOW 20 MIN: CPT | Performed by: NURSE PRACTITIONER

## 2022-04-20 PROCEDURE — 3080F DIAST BP >= 90 MM HG: CPT | Performed by: NURSE PRACTITIONER

## 2022-04-20 RX ORDER — CEFDINIR 300 MG/1
300 CAPSULE ORAL 2 TIMES DAILY
Qty: 20 CAPSULE | Refills: 0 | Status: SHIPPED | OUTPATIENT
Start: 2022-04-20 | End: 2022-04-30

## 2022-04-21 RX ORDER — CHLORTHALIDONE 25 MG/1
25 TABLET ORAL DAILY
Qty: 30 TABLET | Refills: 3 | Status: SHIPPED | OUTPATIENT
Start: 2022-04-21

## 2022-04-21 NOTE — TELEPHONE ENCOUNTER
Pt sent message stating she went to [facility] yesterday for ear pain and was given antibiotics and instructed to f/u with ENT. Pt does not want to f/u with [facility]NT. Okay for referral to Dr. Anupam Jones? Thank you.    LOV: 12/08/21

## 2022-04-21 NOTE — TELEPHONE ENCOUNTER
From: Ray Paez  To: Merissa Gomez MD  Sent: 4/20/2022 7:41 PM CDT  Subject: Non-Urgent Medical Question    Hi Dr. Candice Card,    I went to the walk-in clinic [facility] for ear pain. She has given me a script for another antibiotic and said to f/u with ENT if it doesn't improve. The doctor she recommended is thru [facility] and I'd prefer not to go thru their group of possible. Could you recommend anyone other than [facility] for me? Thanks so much!     Obdulia Morelos

## 2022-04-22 ENCOUNTER — PATIENT MESSAGE (OUTPATIENT)
Dept: FAMILY MEDICINE CLINIC | Facility: CLINIC | Age: 42
End: 2022-04-22

## 2022-04-25 NOTE — TELEPHONE ENCOUNTER
From: Cari Winston  To: Ashanti Navarrete MD  Sent: 4/22/2022 5:56 PM CDT  Subject: Non-Urgent Medical Question    Hi Dr. Vijay Clarke,    I'm overdue for my mammogram and noticed some bloody discharge today. 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 Could you please place the orders for me and fax them to [phone]     Thank No Brand

## 2022-04-25 NOTE — TELEPHONE ENCOUNTER
I'm overdue for my mammogram and noticed some bloody discharge today. 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  Could you please place the orders for me    Please advise

## 2022-07-09 DIAGNOSIS — F43.0 ACUTE STRESS REACTION: ICD-10-CM

## 2022-07-09 DIAGNOSIS — R61 HYPERHIDROSIS: ICD-10-CM

## 2022-07-09 DIAGNOSIS — F41.1 GAD (GENERALIZED ANXIETY DISORDER): ICD-10-CM

## 2022-07-10 DIAGNOSIS — G47.09 OTHER INSOMNIA: ICD-10-CM

## 2022-07-12 RX ORDER — ALPRAZOLAM 0.25 MG/1
0.25 TABLET ORAL
Qty: 30 TABLET | Refills: 1 | Status: SHIPPED | OUTPATIENT
Start: 2022-07-12

## 2022-07-12 RX ORDER — CHLORTHALIDONE 25 MG/1
25 TABLET ORAL DAILY
Qty: 30 TABLET | Refills: 0 | Status: SHIPPED | OUTPATIENT
Start: 2022-07-12

## 2022-07-12 RX ORDER — ESCITALOPRAM OXALATE 20 MG/1
20 TABLET ORAL DAILY
Qty: 90 TABLET | Refills: 0 | Status: SHIPPED | OUTPATIENT
Start: 2022-07-12

## 2022-08-08 DIAGNOSIS — R61 HYPERHIDROSIS: ICD-10-CM

## 2022-08-09 RX ORDER — CHLORTHALIDONE 25 MG/1
25 TABLET ORAL DAILY
Qty: 30 TABLET | Refills: 0 | Status: SHIPPED | OUTPATIENT
Start: 2022-08-09

## 2022-10-11 DIAGNOSIS — F41.1 GAD (GENERALIZED ANXIETY DISORDER): ICD-10-CM

## 2022-10-11 DIAGNOSIS — F43.0 ACUTE STRESS REACTION: ICD-10-CM

## 2022-10-11 RX ORDER — ESCITALOPRAM OXALATE 20 MG/1
TABLET ORAL
Qty: 90 TABLET | Refills: 0 | Status: SHIPPED | OUTPATIENT
Start: 2022-10-11

## 2022-10-21 ENCOUNTER — APPOINTMENT (OUTPATIENT)
Dept: HEMATOLOGY/ONCOLOGY | Facility: HOSPITAL | Age: 42
End: 2022-10-21
Attending: INTERNAL MEDICINE
Payer: COMMERCIAL

## 2022-10-21 ENCOUNTER — TELEPHONE (OUTPATIENT)
Dept: HEMATOLOGY/ONCOLOGY | Facility: HOSPITAL | Age: 42
End: 2022-10-21

## 2022-10-21 NOTE — TELEPHONE ENCOUNTER
Patient is cancelling her appointment for today 10/21/22 with Dr. Laureano Arita because she had to work today.

## 2022-10-28 ENCOUNTER — OFFICE VISIT (OUTPATIENT)
Dept: HEMATOLOGY/ONCOLOGY | Facility: HOSPITAL | Age: 42
End: 2022-10-28
Attending: INTERNAL MEDICINE
Payer: COMMERCIAL

## 2022-10-28 VITALS
HEART RATE: 86 BPM | TEMPERATURE: 98 F | WEIGHT: 203.81 LBS | SYSTOLIC BLOOD PRESSURE: 157 MMHG | RESPIRATION RATE: 18 BRPM | OXYGEN SATURATION: 95 % | BODY MASS INDEX: 35 KG/M2 | DIASTOLIC BLOOD PRESSURE: 112 MMHG

## 2022-10-28 DIAGNOSIS — E83.110 HEREDITARY HEMOCHROMATOSIS (HCC): Primary | ICD-10-CM

## 2022-10-28 LAB
BASOPHILS # BLD AUTO: 0.08 X10(3) UL (ref 0–0.2)
BASOPHILS NFR BLD AUTO: 0.9 %
DEPRECATED HBV CORE AB SER IA-ACNC: 210.9 NG/ML
EOSINOPHIL # BLD AUTO: 0.16 X10(3) UL (ref 0–0.7)
EOSINOPHIL NFR BLD AUTO: 1.8 %
ERYTHROCYTE [DISTWIDTH] IN BLOOD BY AUTOMATED COUNT: 11.7 %
HCT VFR BLD AUTO: 42.4 %
HGB BLD-MCNC: 15.6 G/DL
IMM GRANULOCYTES # BLD AUTO: 0.03 X10(3) UL (ref 0–1)
IMM GRANULOCYTES NFR BLD: 0.3 %
IRON SATN MFR SERPL: 39 %
IRON SERPL-MCNC: 119 UG/DL
LYMPHOCYTES # BLD AUTO: 3.05 X10(3) UL (ref 1–4)
LYMPHOCYTES NFR BLD AUTO: 34.3 %
MCH RBC QN AUTO: 35.9 PG (ref 26–34)
MCHC RBC AUTO-ENTMCNC: 36.8 G/DL (ref 31–37)
MCV RBC AUTO: 97.5 FL
MONOCYTES # BLD AUTO: 0.57 X10(3) UL (ref 0.1–1)
MONOCYTES NFR BLD AUTO: 6.4 %
NEUTROPHILS # BLD AUTO: 5.01 X10 (3) UL (ref 1.5–7.7)
NEUTROPHILS # BLD AUTO: 5.01 X10(3) UL (ref 1.5–7.7)
NEUTROPHILS NFR BLD AUTO: 56.3 %
PLATELET # BLD AUTO: 326 10(3)UL (ref 150–450)
RBC # BLD AUTO: 4.35 X10(6)UL
TIBC SERPL-MCNC: 307 UG/DL (ref 240–450)
TRANSFERRIN SERPL-MCNC: 206 MG/DL (ref 200–360)
WBC # BLD AUTO: 8.9 X10(3) UL (ref 4–11)

## 2022-10-28 PROCEDURE — 99214 OFFICE O/P EST MOD 30 MIN: CPT | Performed by: INTERNAL MEDICINE

## 2022-10-28 NOTE — PROGRESS NOTES
Pt here for follow up. Blood pressure high. No symptoms. She states she takes her bp at work and it has been fine. She does not remember the last time she had a phlebotomy. She complains of fatigue.     Outpatient Oncology Care Plan  Problem list:  knowledge deficit    Problems related to:    disease/disease progression    Interventions:  provided general teaching    Expected outcomes:  understands plan of care    Progress towards outcome:  making progress    Education Record    Learner:  Patient  Barriers / Limitations:  None  Method:  Brief focused  Outcome:  Shows understanding  Comments:

## 2022-11-04 ENCOUNTER — APPOINTMENT (OUTPATIENT)
Dept: HEMATOLOGY/ONCOLOGY | Facility: HOSPITAL | Age: 42
End: 2022-11-04
Attending: INTERNAL MEDICINE
Payer: COMMERCIAL

## 2022-11-16 ENCOUNTER — NURSE ONLY (OUTPATIENT)
Dept: HEMATOLOGY/ONCOLOGY | Facility: HOSPITAL | Age: 42
End: 2022-11-16
Attending: INTERNAL MEDICINE
Payer: COMMERCIAL

## 2022-11-16 VITALS
BODY MASS INDEX: 35 KG/M2 | SYSTOLIC BLOOD PRESSURE: 187 MMHG | DIASTOLIC BLOOD PRESSURE: 131 MMHG | OXYGEN SATURATION: 100 % | HEART RATE: 97 BPM | TEMPERATURE: 98 F | WEIGHT: 203.81 LBS | RESPIRATION RATE: 18 BRPM

## 2022-11-16 DIAGNOSIS — E83.110 HEREDITARY HEMOCHROMATOSIS (HCC): Primary | ICD-10-CM

## 2022-11-16 LAB — HGB BLD-MCNC: 15.7 G/DL

## 2022-11-16 PROCEDURE — 99195 PHLEBOTOMY: CPT

## 2022-11-16 PROCEDURE — 85018 HEMOGLOBIN: CPT

## 2022-11-16 PROCEDURE — 36415 COLL VENOUS BLD VENIPUNCTURE: CPT

## 2022-11-28 ENCOUNTER — OFFICE VISIT (OUTPATIENT)
Dept: FAMILY MEDICINE CLINIC | Facility: CLINIC | Age: 42
End: 2022-11-28
Payer: COMMERCIAL

## 2022-11-28 DIAGNOSIS — Z12.11 SCREENING FOR COLORECTAL CANCER: ICD-10-CM

## 2022-11-28 DIAGNOSIS — Z00.00 ANNUAL PHYSICAL EXAM: Primary | ICD-10-CM

## 2022-11-28 DIAGNOSIS — Z12.12 SCREENING FOR COLORECTAL CANCER: ICD-10-CM

## 2022-11-28 DIAGNOSIS — R61 HYPERHIDROSIS: ICD-10-CM

## 2022-11-28 DIAGNOSIS — Z13.29 SCREENING FOR ENDOCRINE DISORDER: ICD-10-CM

## 2022-11-28 DIAGNOSIS — Z23 NEED FOR VACCINATION: ICD-10-CM

## 2022-11-28 DIAGNOSIS — Z23 NEED FOR TDAP VACCINATION: ICD-10-CM

## 2022-11-28 DIAGNOSIS — R00.2 HEART PALPITATIONS: ICD-10-CM

## 2022-11-28 DIAGNOSIS — G47.09 OTHER INSOMNIA: ICD-10-CM

## 2022-11-28 PROCEDURE — 90686 IIV4 VACC NO PRSV 0.5 ML IM: CPT | Performed by: FAMILY MEDICINE

## 2022-11-28 PROCEDURE — 99396 PREV VISIT EST AGE 40-64: CPT | Performed by: FAMILY MEDICINE

## 2022-11-28 PROCEDURE — 3075F SYST BP GE 130 - 139MM HG: CPT | Performed by: FAMILY MEDICINE

## 2022-11-28 PROCEDURE — 3008F BODY MASS INDEX DOCD: CPT | Performed by: FAMILY MEDICINE

## 2022-11-28 PROCEDURE — 90715 TDAP VACCINE 7 YRS/> IM: CPT | Performed by: FAMILY MEDICINE

## 2022-11-28 PROCEDURE — 90472 IMMUNIZATION ADMIN EACH ADD: CPT | Performed by: FAMILY MEDICINE

## 2022-11-28 PROCEDURE — 90471 IMMUNIZATION ADMIN: CPT | Performed by: FAMILY MEDICINE

## 2022-11-28 PROCEDURE — 3079F DIAST BP 80-89 MM HG: CPT | Performed by: FAMILY MEDICINE

## 2022-11-28 PROCEDURE — 99214 OFFICE O/P EST MOD 30 MIN: CPT | Performed by: FAMILY MEDICINE

## 2022-11-28 RX ORDER — METOPROLOL SUCCINATE 25 MG/1
25 TABLET, EXTENDED RELEASE ORAL EVERY MORNING
Qty: 30 TABLET | Refills: 2 | Status: SHIPPED | OUTPATIENT
Start: 2022-11-28

## 2022-11-28 RX ORDER — CHLORTHALIDONE 25 MG/1
25 TABLET ORAL DAILY
Qty: 30 TABLET | Refills: 3 | Status: SHIPPED | OUTPATIENT
Start: 2022-11-28

## 2022-11-28 RX ORDER — ALPRAZOLAM 0.25 MG/1
0.25 TABLET ORAL
Qty: 30 TABLET | Refills: 1 | Status: SHIPPED | OUTPATIENT
Start: 2022-11-28

## 2022-11-29 ENCOUNTER — LAB ENCOUNTER (OUTPATIENT)
Dept: LAB | Age: 42
End: 2022-11-29
Attending: FAMILY MEDICINE
Payer: COMMERCIAL

## 2022-11-29 DIAGNOSIS — Z12.12 SCREENING FOR COLORECTAL CANCER: ICD-10-CM

## 2022-11-29 DIAGNOSIS — Z12.11 SCREENING FOR COLORECTAL CANCER: ICD-10-CM

## 2022-11-29 DIAGNOSIS — Z13.29 SCREENING FOR ENDOCRINE DISORDER: ICD-10-CM

## 2022-11-29 LAB
CHOLEST SERPL-MCNC: 191 MG/DL (ref ?–200)
FASTING PATIENT LIPID ANSWER: YES
HDLC SERPL-MCNC: 47 MG/DL (ref 40–59)
LDLC SERPL CALC-MCNC: 96 MG/DL (ref ?–100)
NONHDLC SERPL-MCNC: 144 MG/DL (ref ?–130)
T4 FREE SERPL-MCNC: 1 NG/DL (ref 0.8–1.7)
TRIGL SERPL-MCNC: 286 MG/DL (ref 30–149)
TSI SER-ACNC: 2.64 MIU/ML (ref 0.36–3.74)
VLDLC SERPL CALC-MCNC: 48 MG/DL (ref 0–30)

## 2022-11-29 PROCEDURE — 84439 ASSAY OF FREE THYROXINE: CPT | Performed by: FAMILY MEDICINE

## 2022-11-29 PROCEDURE — 80061 LIPID PANEL: CPT | Performed by: FAMILY MEDICINE

## 2022-11-29 PROCEDURE — 84443 ASSAY THYROID STIM HORMONE: CPT | Performed by: FAMILY MEDICINE

## 2022-11-30 ENCOUNTER — HOSPITAL ENCOUNTER (OUTPATIENT)
Dept: CV DIAGNOSTICS | Facility: HOSPITAL | Age: 42
Discharge: HOME | End: 2022-11-30
Attending: FAMILY MEDICINE
Payer: COMMERCIAL

## 2022-11-30 VITALS
BODY MASS INDEX: 34.31 KG/M2 | SYSTOLIC BLOOD PRESSURE: 138 MMHG | WEIGHT: 201 LBS | HEIGHT: 64 IN | DIASTOLIC BLOOD PRESSURE: 89 MMHG | RESPIRATION RATE: 16 BRPM | HEART RATE: 126 BPM | OXYGEN SATURATION: 97 %

## 2022-11-30 DIAGNOSIS — R00.2 HEART PALPITATIONS: ICD-10-CM

## 2022-11-30 PROCEDURE — 93226 XTRNL ECG REC<48 HR SCAN A/R: CPT | Performed by: FAMILY MEDICINE

## 2022-11-30 PROCEDURE — 93225 XTRNL ECG REC<48 HRS REC: CPT | Performed by: FAMILY MEDICINE

## 2022-11-30 PROCEDURE — 93227 XTRNL ECG REC<48 HR R&I: CPT | Performed by: FAMILY MEDICINE

## 2022-12-02 ENCOUNTER — TELEPHONE (OUTPATIENT)
Dept: FAMILY MEDICINE CLINIC | Facility: CLINIC | Age: 42
End: 2022-12-02

## 2022-12-02 NOTE — TELEPHONE ENCOUNTER
[employer] message sent regarding results, and recommendation as noted below.     ----- Message from Johnaa Ortiz MD sent at 12/2/2022 10:50 AM CST -----  Stable labs, advise on low carb diet ok to recheck in 1 year

## 2022-12-06 ENCOUNTER — PATIENT MESSAGE (OUTPATIENT)
Dept: FAMILY MEDICINE CLINIC | Facility: CLINIC | Age: 42
End: 2022-12-06

## 2022-12-06 ENCOUNTER — TELEPHONE (OUTPATIENT)
Dept: FAMILY MEDICINE CLINIC | Facility: CLINIC | Age: 42
End: 2022-12-06

## 2022-12-06 DIAGNOSIS — R00.2 HEART PALPITATIONS: ICD-10-CM

## 2022-12-06 NOTE — TELEPHONE ENCOUNTER
Pt was seen 11/28 for HTN and palpitations. Starting last Wednesday or Thursday she started experiencing some sharp chest pain. It is in the center but raidates to the left a little. Episodes are usually brief, however, on Sunday it did last for about 5 minutes. She is not SOB. BP still remains elevated. Today is 140/99 but has not had chest pain today. She did complete the 24 hour holter monitor and said she did have episodes of chest pain while she was wearing the monitor. Below are additional recent readings for her. Please advise, thanks. BP.  Last week remained elevated 169/111, 150/106, 164/107, 167/108    Metoprolol succinate 25mg daily.

## 2022-12-06 NOTE — TELEPHONE ENCOUNTER
From: No Moses  To: Jennifer Masters MD  Sent: 12/6/2022 7:20 AM CST  Subject: Other    Hi Dr. Stan Gonzalez,    Just following up with you as requested regarding my BP. Last week remained elevated 169/111, 150/106, 164/107, 167/108. This morning it was lower @ 140/99. I'm having random shooting pains in my chest which last anywhere from a few seconds to 5 minutes. Headaches are improving slightly. I wore the holter monitor and turned it in over the weekend. I believe they told me it would be 5 days for the results of that.      Thank Sarah Gomes

## 2022-12-09 ENCOUNTER — PATIENT MESSAGE (OUTPATIENT)
Dept: FAMILY MEDICINE CLINIC | Facility: CLINIC | Age: 42
End: 2022-12-09

## 2022-12-09 RX ORDER — METOPROLOL SUCCINATE 25 MG/1
25 TABLET, EXTENDED RELEASE ORAL 2 TIMES DAILY
Qty: 60 TABLET | Refills: 2 | COMMUNITY
Start: 2022-12-09

## 2022-12-09 NOTE — TELEPHONE ENCOUNTER
From: Nicolas Neal  To: Trixie Rios MD  Sent: 12/9/2022 1:40 PM CST  Subject: Test Results Question    I have a question about CARDIAC HOLTER MONITOR 24 HOUR resulted on 12/9/22, 8:43 AM.    Hi Dr. Connolly Last,    I'm assuming the results of this test are normal, but please let me know if they are abnormal in any way.      Thank Mihaela Montoya

## 2022-12-12 ENCOUNTER — PATIENT MESSAGE (OUTPATIENT)
Dept: FAMILY MEDICINE CLINIC | Facility: CLINIC | Age: 42
End: 2022-12-12

## 2022-12-13 NOTE — TELEPHONE ENCOUNTER
From: Ray Paez  To: Merissa Gomez MD  Sent: 12/12/2022 6:00 PM CST  Subject: Visit Follow-up Question    Hi Dr. Haylee Aguirre been taking the BP med twice a day and BP was down to 135/82 today, so it's definitely bringing it down. I'm feeling better as far as headaches and the \"anxious\" feeling I was having. However, I am getting beyond exhausted and short of breath just going down stairs or walking a short hallway at work. I've never experienced these symptoms to this degree and it is getting increasingly worse. I normally walk on the treadmill and haven't been able to do more than a few minutes without having to stop. Please let me know what I can do to improve this. I want to continue exercising but I'm unable when these symptoms occur.      Thank Mansoor Zayas

## 2022-12-13 NOTE — TELEPHONE ENCOUNTER
Pt sent message stating BP is trending down. Today's BP was 135/82. Pt then c/o sob, random chest pain and dizziness. Pt had these symptoms while holter monitor was in place. Please advise. Thank you.  LOV: 11/28/22

## 2022-12-14 NOTE — TELEPHONE ENCOUNTER
Patient's lightheaded and dizziness correlated to normal sinus rhythm on Holter monitor okay to watch for now. Advised her on deep breathing when that happens supportive care and update us next week. We can add buspar if patient is interested, sounds like increase anxiety when she has palpitations.

## 2023-01-24 ENCOUNTER — TELEPHONE (OUTPATIENT)
Dept: FAMILY MEDICINE CLINIC | Facility: CLINIC | Age: 43
End: 2023-01-24

## 2023-01-24 DIAGNOSIS — R00.2 HEART PALPITATIONS: ICD-10-CM

## 2023-01-24 RX ORDER — METOPROLOL SUCCINATE 25 MG/1
TABLET, EXTENDED RELEASE ORAL
Qty: 90 TABLET | Refills: 0 | Status: SHIPPED | OUTPATIENT
Start: 2023-01-24

## 2023-01-24 NOTE — TELEPHONE ENCOUNTER
I saw patient today for her son's visit and asked her how her blood pressure is doing she reports that her average blood pressure has been 150/90 please send her a [employer] message to increase her metoprolol succinate ER to 50 mg in the morning and 25 mg at night for a total of 75 mg.

## 2023-01-31 ENCOUNTER — OFFICE VISIT (OUTPATIENT)
Facility: LOCATION | Age: 43
End: 2023-01-31
Payer: COMMERCIAL

## 2023-01-31 ENCOUNTER — HOSPITAL ENCOUNTER (OUTPATIENT)
Dept: MAMMOGRAPHY | Age: 43
Discharge: HOME | End: 2023-01-31
Attending: FAMILY MEDICINE
Payer: COMMERCIAL

## 2023-01-31 DIAGNOSIS — Z12.31 ENCOUNTER FOR SCREENING MAMMOGRAM FOR MALIGNANT NEOPLASM OF BREAST: ICD-10-CM

## 2023-01-31 DIAGNOSIS — R10.11 ABDOMINAL PAIN, RUQ (RIGHT UPPER QUADRANT): Primary | ICD-10-CM

## 2023-01-31 PROCEDURE — 99244 OFF/OP CNSLTJ NEW/EST MOD 40: CPT | Performed by: SURGERY

## 2023-01-31 PROCEDURE — 77067 SCR MAMMO BI INCL CAD: CPT | Performed by: FAMILY MEDICINE

## 2023-01-31 PROCEDURE — 77063 BREAST TOMOSYNTHESIS BI: CPT | Performed by: FAMILY MEDICINE

## 2023-01-31 RX ORDER — POLYETHYLENE GLYCOL 3350, SODIUM CHLORIDE, SODIUM BICARBONATE, POTASSIUM CHLORIDE 420; 11.2; 5.72; 1.48 G/4L; G/4L; G/4L; G/4L
POWDER, FOR SOLUTION ORAL
Qty: 1 EACH | Refills: 0 | Status: SHIPPED | OUTPATIENT
Start: 2023-01-31

## 2023-02-06 DIAGNOSIS — R51.9 HEADACHE DISORDER: ICD-10-CM

## 2023-02-06 DIAGNOSIS — R51.9 ACUTE NONINTRACTABLE HEADACHE, UNSPECIFIED HEADACHE TYPE: ICD-10-CM

## 2023-02-07 RX ORDER — ONDANSETRON 4 MG/1
4 TABLET, FILM COATED ORAL EVERY 8 HOURS PRN
Qty: 20 TABLET | Refills: 1 | Status: SHIPPED | OUTPATIENT
Start: 2023-02-07

## 2023-02-09 DIAGNOSIS — R00.2 HEART PALPITATIONS: ICD-10-CM

## 2023-02-09 DIAGNOSIS — G47.09 OTHER INSOMNIA: ICD-10-CM

## 2023-02-09 DIAGNOSIS — F41.1 GAD (GENERALIZED ANXIETY DISORDER): ICD-10-CM

## 2023-02-09 DIAGNOSIS — F43.0 ACUTE STRESS REACTION: ICD-10-CM

## 2023-02-11 DIAGNOSIS — F41.1 GAD (GENERALIZED ANXIETY DISORDER): ICD-10-CM

## 2023-02-11 DIAGNOSIS — F43.0 ACUTE STRESS REACTION: ICD-10-CM

## 2023-02-13 RX ORDER — METOPROLOL SUCCINATE 25 MG/1
TABLET, EXTENDED RELEASE ORAL
Qty: 90 TABLET | Refills: 0 | Status: SHIPPED | OUTPATIENT
Start: 2023-02-13

## 2023-02-13 RX ORDER — ESCITALOPRAM OXALATE 20 MG/1
20 TABLET ORAL DAILY
Qty: 90 TABLET | Refills: 0 | Status: SHIPPED | OUTPATIENT
Start: 2023-02-13

## 2023-02-13 RX ORDER — ESCITALOPRAM OXALATE 20 MG/1
20 TABLET ORAL DAILY
Qty: 90 TABLET | Refills: 0 | OUTPATIENT
Start: 2023-02-13

## 2023-02-13 RX ORDER — ALPRAZOLAM 0.25 MG/1
0.25 TABLET ORAL
Qty: 30 TABLET | Refills: 1 | Status: SHIPPED | OUTPATIENT
Start: 2023-02-13

## 2023-02-19 ENCOUNTER — HOSPITAL ENCOUNTER (OUTPATIENT)
Dept: ULTRASOUND IMAGING | Age: 43
End: 2023-02-19
Attending: SURGERY
Payer: COMMERCIAL

## 2023-02-19 ENCOUNTER — HOSPITAL ENCOUNTER (OUTPATIENT)
Dept: ULTRASOUND IMAGING | Age: 43
Discharge: HOME | End: 2023-02-19
Attending: SURGERY
Payer: COMMERCIAL

## 2023-02-19 DIAGNOSIS — R10.11 ABDOMINAL PAIN, RUQ (RIGHT UPPER QUADRANT): ICD-10-CM

## 2023-02-19 PROCEDURE — 76700 US EXAM ABDOM COMPLETE: CPT | Performed by: SURGERY

## 2023-02-20 ENCOUNTER — PATIENT MESSAGE (OUTPATIENT)
Dept: FAMILY MEDICINE CLINIC | Facility: CLINIC | Age: 43
End: 2023-02-20

## 2023-02-21 ENCOUNTER — APPOINTMENT (OUTPATIENT)
Dept: HEMATOLOGY/ONCOLOGY | Facility: HOSPITAL | Age: 43
End: 2023-02-21
Attending: INTERNAL MEDICINE
Payer: COMMERCIAL

## 2023-02-21 DIAGNOSIS — R10.11 RUQ ABDOMINAL PAIN: Primary | ICD-10-CM

## 2023-02-21 NOTE — PROGRESS NOTES
HIDA ordered. Reply sent to the patient's My Chart message. Follow up with Dr. Saloni Koch in clinic once this has been completed.      Geovanni Malone PA-C

## 2023-02-21 NOTE — TELEPHONE ENCOUNTER
From: Ortega Masterson  To: Luisana Augustin MD  Sent: 2/20/2023 9:41 PM CST  Subject: Test Results Question    Hi Dr. Annemarie Espinosa,    Is there anything I need to worry about with the results of my abdominal u/s and the comments regarding my left kidney?     Thank Sonia May

## 2023-02-22 ENCOUNTER — PATIENT MESSAGE (OUTPATIENT)
Dept: FAMILY MEDICINE CLINIC | Facility: CLINIC | Age: 43
End: 2023-02-22

## 2023-02-23 DIAGNOSIS — R61 HYPERHIDROSIS: ICD-10-CM

## 2023-02-23 RX ORDER — CHLORTHALIDONE 25 MG/1
25 TABLET ORAL DAILY
Qty: 90 TABLET | Refills: 1 | Status: SHIPPED | OUTPATIENT
Start: 2023-02-23

## 2023-02-23 NOTE — TELEPHONE ENCOUNTER
If patient is asymptomatic, ie no urinary complaints and no left flank pain ok to watch for now, if urinary s/s with flank pain, refer to [facility].

## 2023-02-23 NOTE — TELEPHONE ENCOUNTER
See previous TE. No s/s of hydronephrosis with stable kidney stone. Urology only if s/s are not improving.

## 2023-03-12 DIAGNOSIS — G47.09 OTHER INSOMNIA: ICD-10-CM

## 2023-03-13 RX ORDER — ALPRAZOLAM 0.25 MG/1
0.25 TABLET ORAL
Qty: 30 TABLET | Refills: 1 | Status: SHIPPED | OUTPATIENT
Start: 2023-03-13

## 2023-03-13 RX ORDER — HYDROXYZINE HYDROCHLORIDE 25 MG/1
25 TABLET, FILM COATED ORAL EVERY 8 HOURS PRN
Qty: 30 TABLET | Refills: 1 | Status: SHIPPED | OUTPATIENT
Start: 2023-03-13

## 2023-04-20 ENCOUNTER — TELEPHONE (OUTPATIENT)
Facility: LOCATION | Age: 43
End: 2023-04-20

## 2023-04-24 ENCOUNTER — PATIENT MESSAGE (OUTPATIENT)
Dept: FAMILY MEDICINE CLINIC | Facility: CLINIC | Age: 43
End: 2023-04-24

## 2023-04-24 NOTE — TELEPHONE ENCOUNTER
From: Diane Oliva  To: Shelli Duffy MD  Sent: 4/24/2023 2:00 PM CDT  Subject: Non-Urgent Medical Question    Hi Dr. Myla Ibarra,    My weight is still not coming down and Im wondering if Erwinanant Kimble would be a consideration for me to take in order to help with weight loss? My friend is similar to me with high BP, higher than wanted triglycerides and obese. Her doctor put her on Mounjaro and she has had amazing results. Her BP is decreasing, she's lost 30lbs and her triglycerides are dropping. Please let me know if this is something you would consider prescribing for me or not.      Thank Prashanth Giang

## 2023-04-27 ENCOUNTER — LAB REQUISITION (OUTPATIENT)
Age: 43
End: 2023-04-27
Payer: COMMERCIAL

## 2023-04-27 DIAGNOSIS — Z80.0 FAMILY HISTORY OF COLON CANCER REQUIRING SCREENING COLONOSCOPY: ICD-10-CM

## 2023-04-27 PROCEDURE — 88305 TISSUE EXAM BY PATHOLOGIST: CPT | Performed by: SURGERY

## 2023-05-04 ENCOUNTER — PATIENT OUTREACH (OUTPATIENT)
Facility: LOCATION | Age: 43
End: 2023-05-04

## 2023-05-05 ENCOUNTER — MED REC SCAN ONLY (OUTPATIENT)
Facility: LOCATION | Age: 43
End: 2023-05-05

## 2023-05-05 ENCOUNTER — PATIENT OUTREACH (OUTPATIENT)
Facility: LOCATION | Age: 43
End: 2023-05-05

## 2023-05-08 ENCOUNTER — PATIENT MESSAGE (OUTPATIENT)
Facility: LOCATION | Age: 43
End: 2023-05-08

## 2023-05-08 ENCOUNTER — HOSPITAL ENCOUNTER (OUTPATIENT)
Dept: NUCLEAR MEDICINE | Facility: HOSPITAL | Age: 43
Discharge: HOME | End: 2023-05-08
Attending: PHYSICIAN ASSISTANT
Payer: COMMERCIAL

## 2023-05-08 DIAGNOSIS — R10.11 RUQ ABDOMINAL PAIN: ICD-10-CM

## 2023-05-08 PROCEDURE — 78227 HEPATOBIL SYST IMAGE W/DRUG: CPT | Performed by: PHYSICIAN ASSISTANT

## 2023-05-08 NOTE — TELEPHONE ENCOUNTER
From: Wes Feliciano  To: Paula LimakimberlyDO  Sent: 5/8/2023 1:44 PM CDT  Subject: Test Results Question    Hi Dr. Kala Almanza,    I had the HIDA test you ordered and the results were normal. To be honest I thought they would show something given the symptoms I continue to have. Is there anything else I can do to find the root cause of this pain and nausea? I was in terrible pain two days ago and it was to the point that I was unable to stand straight. It was RUQ pain which also went to my shoulder blade. My abdomen also was rigid - almost felt like when you get a harris horse.      Thank Chico Vazquez

## 2023-05-09 ENCOUNTER — TELEPHONE (OUTPATIENT)
Facility: LOCATION | Age: 43
End: 2023-05-09

## 2023-05-09 DIAGNOSIS — R10.9 ABDOMINAL PAIN, UNSPECIFIED ABDOMINAL LOCATION: Primary | ICD-10-CM

## 2023-05-09 NOTE — TELEPHONE ENCOUNTER
Placed order for CT abdomen with oral/IV contrast per Dr. Danay Osborn. Spoke with patient. Advised patient to call central scheduling and follow up in clinic with Dr. Danay Osborn after imaging is completed. Patient voiced understanding.      Orders Placed This Encounter      CT ABDOMEN(CONTRAST ONLY) (CPT=74160)

## 2023-05-09 NOTE — TELEPHONE ENCOUNTER
----- Message from Leonel Bingham sent at 5/8/2023  8:58 PM CDT -----  Regarding: RE: Test Results Question  Contact: [phone]  Thank you for your response. I will contact the office to schedule a follow up appointment. 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 Is this something I could have ordered and performed before I have a follow up appointment? Thank Boyd Anderson     ----- Message -----  From: Mary Ann Otto  Sent: 5/8/23, 2:24 PM  To: Leonel Bingham  Subject: Test Results Question    Good afternoon    The best way to get reassessed and discuss your plan of care of Dr. Darya Mark would be to make an appointment and come in to discuss. Please call our office at [phone] to schedule. Thank you       ----- Message -----       From:Audelia Calvo       Sent:5/8/2023  1:44 PM CDT         To:Kevin Escobar,     Subject:Test Results Question    Hi Dr. Darya Mark,    I had the HIDA test you ordered and the results were normal. To be honest I thought they would show something given the symptoms I continue to have. Is there anything else I can do to find the root cause of this pain and nausea? I was in terrible pain two days ago and it was to the point that I was unable to stand straight. It was RUQ pain which also went to my shoulder blade. My abdomen also was rigid - almost felt like when you get a harris horse.       Thank Boyd Anderson

## 2023-05-16 ENCOUNTER — OFFICE VISIT (OUTPATIENT)
Facility: CLINIC | Age: 43
End: 2023-05-16
Payer: COMMERCIAL

## 2023-05-16 ENCOUNTER — LAB ENCOUNTER (OUTPATIENT)
Dept: LAB | Age: 43
End: 2023-05-16
Attending: STUDENT IN AN ORGANIZED HEALTH CARE EDUCATION/TRAINING PROGRAM
Payer: COMMERCIAL

## 2023-05-16 VITALS — OXYGEN SATURATION: 97 % | DIASTOLIC BLOOD PRESSURE: 86 MMHG | SYSTOLIC BLOOD PRESSURE: 142 MMHG | HEART RATE: 88 BPM

## 2023-05-16 DIAGNOSIS — R53.83 OTHER FATIGUE: Primary | ICD-10-CM

## 2023-05-16 DIAGNOSIS — R23.2 HOT FLASHES: ICD-10-CM

## 2023-05-16 PROCEDURE — 99204 OFFICE O/P NEW MOD 45 MIN: CPT | Performed by: STUDENT IN AN ORGANIZED HEALTH CARE EDUCATION/TRAINING PROGRAM

## 2023-05-16 PROCEDURE — 3077F SYST BP >= 140 MM HG: CPT | Performed by: STUDENT IN AN ORGANIZED HEALTH CARE EDUCATION/TRAINING PROGRAM

## 2023-05-16 PROCEDURE — 3079F DIAST BP 80-89 MM HG: CPT | Performed by: STUDENT IN AN ORGANIZED HEALTH CARE EDUCATION/TRAINING PROGRAM

## 2023-05-17 DIAGNOSIS — F43.0 ACUTE STRESS REACTION: ICD-10-CM

## 2023-05-17 DIAGNOSIS — G47.09 OTHER INSOMNIA: ICD-10-CM

## 2023-05-17 DIAGNOSIS — R00.2 HEART PALPITATIONS: ICD-10-CM

## 2023-05-17 DIAGNOSIS — F41.1 GAD (GENERALIZED ANXIETY DISORDER): ICD-10-CM

## 2023-05-18 RX ORDER — ESCITALOPRAM OXALATE 20 MG/1
20 TABLET ORAL DAILY
Qty: 90 TABLET | Refills: 0 | Status: SHIPPED | OUTPATIENT
Start: 2023-05-18

## 2023-05-18 RX ORDER — ALPRAZOLAM 0.25 MG/1
0.25 TABLET ORAL
Qty: 30 TABLET | Refills: 0 | Status: SHIPPED | OUTPATIENT
Start: 2023-05-18

## 2023-05-18 RX ORDER — METOPROLOL SUCCINATE 25 MG/1
TABLET, EXTENDED RELEASE ORAL
Qty: 90 TABLET | Refills: 0 | Status: SHIPPED | OUTPATIENT
Start: 2023-05-18

## 2023-05-22 ENCOUNTER — OFFICE VISIT (OUTPATIENT)
Dept: SURGERY | Facility: CLINIC | Age: 43
End: 2023-05-22
Payer: COMMERCIAL

## 2023-05-22 VITALS
SYSTOLIC BLOOD PRESSURE: 126 MMHG | DIASTOLIC BLOOD PRESSURE: 82 MMHG | HEIGHT: 64 IN | WEIGHT: 200 LBS | BODY MASS INDEX: 34.15 KG/M2 | HEART RATE: 72 BPM

## 2023-05-22 DIAGNOSIS — M54.16 LUMBAR RADICULOPATHY: Primary | ICD-10-CM

## 2023-05-22 PROCEDURE — 99203 OFFICE O/P NEW LOW 30 MIN: CPT | Performed by: PHYSICIAN ASSISTANT

## 2023-05-22 PROCEDURE — 3008F BODY MASS INDEX DOCD: CPT | Performed by: PHYSICIAN ASSISTANT

## 2023-05-22 PROCEDURE — 3074F SYST BP LT 130 MM HG: CPT | Performed by: PHYSICIAN ASSISTANT

## 2023-05-22 PROCEDURE — 3079F DIAST BP 80-89 MM HG: CPT | Performed by: PHYSICIAN ASSISTANT

## 2023-05-22 RX ORDER — PREGABALIN 50 MG/1
CAPSULE ORAL
Qty: 60 CAPSULE | Refills: 0 | Status: SHIPPED | OUTPATIENT
Start: 2023-05-22

## 2023-05-22 RX ORDER — METHYLPREDNISOLONE 4 MG/1
TABLET ORAL
Qty: 1 EACH | Refills: 0 | Status: SHIPPED | OUTPATIENT
Start: 2023-05-22

## 2023-05-22 NOTE — PROGRESS NOTES
New patient:  Reason for visit:   Lower back pain with left leg numbness and tingling     Estimated time of onset:6 months     Numeric Rating Scale:   Pain at Present:  4/10                                                                                                                       Distribution of Pain:    left    Past Treatments for Current Pain Condition:   NSAIDS  Response to treatment: some relief    Prior diagnostic testing related to this condition:    No recent imaging

## 2023-06-07 ENCOUNTER — LAB ENCOUNTER (OUTPATIENT)
Dept: LAB | Age: 43
End: 2023-06-07
Attending: STUDENT IN AN ORGANIZED HEALTH CARE EDUCATION/TRAINING PROGRAM
Payer: COMMERCIAL

## 2023-06-07 DIAGNOSIS — R23.2 HOT FLASHES: ICD-10-CM

## 2023-06-07 DIAGNOSIS — R53.83 OTHER FATIGUE: ICD-10-CM

## 2023-06-07 LAB
CORTIS SERPL-MCNC: 15.6 UG/DL
ERYTHROCYTE [DISTWIDTH] IN BLOOD BY AUTOMATED COUNT: 12.6 %
ESTRADIOL SERPL-MCNC: 40.2 PG/ML
FSH SERPL-ACNC: 3.3 MIU/ML
HCT VFR BLD AUTO: 44.3 %
HGB BLD-MCNC: 15.2 G/DL
MCH RBC QN AUTO: 34.3 PG (ref 26–34)
MCHC RBC AUTO-ENTMCNC: 34.3 G/DL (ref 31–37)
MCV RBC AUTO: 100 FL
PLATELET # BLD AUTO: 270 10(3)UL (ref 150–450)
PROLACTIN SERPL-MCNC: 8 NG/ML
RBC # BLD AUTO: 4.43 X10(6)UL
VIT D+METAB SERPL-MCNC: 30.3 NG/ML (ref 30–100)
WBC # BLD AUTO: 7.6 X10(3) UL (ref 4–11)

## 2023-06-07 PROCEDURE — 85027 COMPLETE CBC AUTOMATED: CPT | Performed by: STUDENT IN AN ORGANIZED HEALTH CARE EDUCATION/TRAINING PROGRAM

## 2023-06-07 PROCEDURE — 84146 ASSAY OF PROLACTIN: CPT | Performed by: STUDENT IN AN ORGANIZED HEALTH CARE EDUCATION/TRAINING PROGRAM

## 2023-06-07 PROCEDURE — 83001 ASSAY OF GONADOTROPIN (FSH): CPT | Performed by: STUDENT IN AN ORGANIZED HEALTH CARE EDUCATION/TRAINING PROGRAM

## 2023-06-07 PROCEDURE — 82670 ASSAY OF TOTAL ESTRADIOL: CPT | Performed by: STUDENT IN AN ORGANIZED HEALTH CARE EDUCATION/TRAINING PROGRAM

## 2023-06-07 PROCEDURE — 82306 VITAMIN D 25 HYDROXY: CPT | Performed by: STUDENT IN AN ORGANIZED HEALTH CARE EDUCATION/TRAINING PROGRAM

## 2023-06-07 PROCEDURE — 82533 TOTAL CORTISOL: CPT | Performed by: STUDENT IN AN ORGANIZED HEALTH CARE EDUCATION/TRAINING PROGRAM

## 2023-06-08 ENCOUNTER — TELEPHONE (OUTPATIENT)
Facility: CLINIC | Age: 43
End: 2023-06-08

## 2023-06-08 NOTE — TELEPHONE ENCOUNTER
Called pt and informed of the following information from Dr. Lavell Wang: \"  CBC, vit D, cortisol, PRL all normal. FSH seems to be in pre-menopausal range but Estradiol seems a little low for premenopausal -- would recommend her to have OBGYN review the estradiol level. \"  Pt verbalized understanding of all but states she is not on estradiol. I informed her that I would relay that information to Dr. Lavell Wang. Patient is requesting suggestions for OB/GYN provider to follow up with. Referred pt to [facility] website physician finder. Routing message to Dr Lavell Wang so she can be informed that pt is not on Estradiol.

## 2023-06-26 ENCOUNTER — OFFICE VISIT (OUTPATIENT)
Dept: FAMILY MEDICINE CLINIC | Facility: CLINIC | Age: 43
End: 2023-06-26
Payer: COMMERCIAL

## 2023-06-26 VITALS
OXYGEN SATURATION: 96 % | DIASTOLIC BLOOD PRESSURE: 82 MMHG | RESPIRATION RATE: 16 BRPM | WEIGHT: 200 LBS | HEIGHT: 64 IN | HEART RATE: 76 BPM | BODY MASS INDEX: 34.15 KG/M2 | SYSTOLIC BLOOD PRESSURE: 120 MMHG

## 2023-06-26 DIAGNOSIS — E34.8 ESTRADIOL DEFICIENCY: ICD-10-CM

## 2023-06-26 DIAGNOSIS — Z90.710 S/P VAGINAL HYSTERECTOMY: ICD-10-CM

## 2023-06-26 DIAGNOSIS — Z71.3 WEIGHT LOSS COUNSELING, ENCOUNTER FOR: Primary | ICD-10-CM

## 2023-06-26 PROBLEM — M54.16 LUMBAR RADICULOPATHY: Status: RESOLVED | Noted: 2018-07-06 | Resolved: 2023-06-26

## 2023-06-26 PROCEDURE — 3079F DIAST BP 80-89 MM HG: CPT | Performed by: FAMILY MEDICINE

## 2023-06-26 PROCEDURE — 3074F SYST BP LT 130 MM HG: CPT | Performed by: FAMILY MEDICINE

## 2023-06-26 PROCEDURE — 3008F BODY MASS INDEX DOCD: CPT | Performed by: FAMILY MEDICINE

## 2023-06-26 PROCEDURE — 99214 OFFICE O/P EST MOD 30 MIN: CPT | Performed by: FAMILY MEDICINE

## 2023-07-13 ENCOUNTER — NURSE ONLY (OUTPATIENT)
Dept: HEMATOLOGY/ONCOLOGY | Facility: HOSPITAL | Age: 43
End: 2023-07-13
Attending: INTERNAL MEDICINE
Payer: COMMERCIAL

## 2023-07-13 VITALS
WEIGHT: 202 LBS | RESPIRATION RATE: 16 BRPM | TEMPERATURE: 99 F | HEART RATE: 73 BPM | HEIGHT: 64.02 IN | SYSTOLIC BLOOD PRESSURE: 129 MMHG | BODY MASS INDEX: 34.49 KG/M2 | OXYGEN SATURATION: 96 % | DIASTOLIC BLOOD PRESSURE: 92 MMHG

## 2023-07-13 LAB — HEMOGLOBIN POC: 13.8 G/DL

## 2023-07-13 PROCEDURE — 36416 COLLJ CAPILLARY BLOOD SPEC: CPT

## 2023-07-13 PROCEDURE — 99195 PHLEBOTOMY: CPT

## 2023-07-13 PROCEDURE — 85018 HEMOGLOBIN: CPT

## 2023-07-27 ENCOUNTER — HOSPITAL ENCOUNTER (OUTPATIENT)
Dept: CT IMAGING | Age: 43
Discharge: HOME | End: 2023-07-27
Attending: SURGERY
Payer: COMMERCIAL

## 2023-07-27 DIAGNOSIS — R10.9 ABDOMINAL PAIN, UNSPECIFIED ABDOMINAL LOCATION: ICD-10-CM

## 2023-07-27 PROCEDURE — 82565 ASSAY OF CREATININE: CPT

## 2023-07-27 PROCEDURE — 74160 CT ABDOMEN W/CONTRAST: CPT | Performed by: SURGERY

## 2023-07-27 NOTE — PROGRESS NOTES
Call patient tell her CT scan is normal she has a tiny hiatal hernia no indication for surgery she also has fatty infiltration of the liver.

## 2023-07-29 LAB
CREAT BLD-MCNC: 0.7 MG/DL
EGFRCR SERPLBLD CKD-EPI 2021: 109 ML/MIN/1.73M2 (ref 60–?)

## 2023-08-14 DIAGNOSIS — G47.09 OTHER INSOMNIA: ICD-10-CM

## 2023-08-14 DIAGNOSIS — R00.2 HEART PALPITATIONS: ICD-10-CM

## 2023-08-15 RX ORDER — ALPRAZOLAM 0.25 MG/1
0.25 TABLET ORAL
Qty: 30 TABLET | Refills: 0 | Status: SHIPPED | OUTPATIENT
Start: 2023-08-15

## 2023-08-15 RX ORDER — METOPROLOL SUCCINATE 25 MG/1
TABLET, EXTENDED RELEASE ORAL
Qty: 90 TABLET | Refills: 0 | Status: SHIPPED | OUTPATIENT
Start: 2023-08-15

## 2023-08-17 ENCOUNTER — PATIENT MESSAGE (OUTPATIENT)
Dept: FAMILY MEDICINE CLINIC | Facility: CLINIC | Age: 43
End: 2023-08-17

## 2023-08-17 NOTE — TELEPHONE ENCOUNTER
From: Ernesto Kocher  To: Ander Ling MD  Sent: 8/17/2023 11:53 AM CDT  Subject: Other    Hi Dr. Katrin Garner,    I made an appointment with you for next Tuesday, but I'm wondering if there is anything available tomorrow? I have an area on my arm that seemed like a big bite which was maybe infected, but it seems to be moving to other areas of my arm. They have fluid/puss coming from them which gets crusty and falls off and repeats. They are also itchy. If unable to get in with you, should I go to walk-in clinic to have it looked at or is it ok to wait until Tuesday?     Thank Faith Daniels

## 2023-09-07 ENCOUNTER — OFFICE VISIT (OUTPATIENT)
Dept: INTERNAL MEDICINE CLINIC | Facility: CLINIC | Age: 43
End: 2023-09-07
Payer: COMMERCIAL

## 2023-09-07 VITALS
DIASTOLIC BLOOD PRESSURE: 70 MMHG | BODY MASS INDEX: 33.18 KG/M2 | HEART RATE: 92 BPM | SYSTOLIC BLOOD PRESSURE: 118 MMHG | RESPIRATION RATE: 18 BRPM | WEIGHT: 192 LBS | OXYGEN SATURATION: 98 % | HEIGHT: 63.78 IN

## 2023-09-07 DIAGNOSIS — F32.A ANXIETY AND DEPRESSION: ICD-10-CM

## 2023-09-07 DIAGNOSIS — Z51.81 ENCOUNTER FOR THERAPEUTIC DRUG LEVEL MONITORING: Primary | ICD-10-CM

## 2023-09-07 DIAGNOSIS — E88.81 INSULIN RESISTANCE: ICD-10-CM

## 2023-09-07 DIAGNOSIS — F41.9 ANXIETY AND DEPRESSION: ICD-10-CM

## 2023-09-07 DIAGNOSIS — K76.0 FATTY LIVER: ICD-10-CM

## 2023-09-07 DIAGNOSIS — I10 HYPERTENSION, UNSPECIFIED TYPE: ICD-10-CM

## 2023-09-07 DIAGNOSIS — E66.9 CLASS 1 OBESITY WITH SERIOUS COMORBIDITY AND BODY MASS INDEX (BMI) OF 33.0 TO 33.9 IN ADULT, UNSPECIFIED OBESITY TYPE: ICD-10-CM

## 2023-09-07 PROCEDURE — 3008F BODY MASS INDEX DOCD: CPT | Performed by: PHYSICIAN ASSISTANT

## 2023-09-07 PROCEDURE — 99214 OFFICE O/P EST MOD 30 MIN: CPT | Performed by: PHYSICIAN ASSISTANT

## 2023-09-07 PROCEDURE — 3074F SYST BP LT 130 MM HG: CPT | Performed by: PHYSICIAN ASSISTANT

## 2023-09-07 PROCEDURE — 3078F DIAST BP <80 MM HG: CPT | Performed by: PHYSICIAN ASSISTANT

## 2023-09-07 RX ORDER — METFORMIN HYDROCHLORIDE 750 MG/1
750 TABLET, EXTENDED RELEASE ORAL DAILY
Qty: 90 TABLET | Refills: 0 | Status: SHIPPED | OUTPATIENT
Start: 2023-09-07

## 2023-09-17 DIAGNOSIS — F41.1 GAD (GENERALIZED ANXIETY DISORDER): ICD-10-CM

## 2023-09-17 DIAGNOSIS — F43.0 ACUTE STRESS REACTION: ICD-10-CM

## 2023-09-19 RX ORDER — ESCITALOPRAM OXALATE 20 MG/1
20 TABLET ORAL DAILY
Qty: 90 TABLET | Refills: 0 | Status: SHIPPED | OUTPATIENT
Start: 2023-09-19

## 2023-10-20 ENCOUNTER — APPOINTMENT (OUTPATIENT)
Dept: HEMATOLOGY/ONCOLOGY | Facility: HOSPITAL | Age: 43
End: 2023-10-20
Attending: INTERNAL MEDICINE
Payer: COMMERCIAL

## 2023-12-27 DIAGNOSIS — G47.09 OTHER INSOMNIA: ICD-10-CM

## 2023-12-31 RX ORDER — ALPRAZOLAM 0.25 MG/1
0.25 TABLET ORAL
Qty: 30 TABLET | Refills: 0 | Status: SHIPPED | OUTPATIENT
Start: 2023-12-31

## 2024-01-03 ENCOUNTER — OFFICE VISIT (OUTPATIENT)
Dept: INTERNAL MEDICINE CLINIC | Facility: CLINIC | Age: 44
End: 2024-01-03
Payer: COMMERCIAL

## 2024-01-03 VITALS
BODY MASS INDEX: 31.97 KG/M2 | HEIGHT: 63.78 IN | RESPIRATION RATE: 18 BRPM | OXYGEN SATURATION: 97 % | HEART RATE: 78 BPM | WEIGHT: 185 LBS | SYSTOLIC BLOOD PRESSURE: 146 MMHG | DIASTOLIC BLOOD PRESSURE: 90 MMHG

## 2024-01-03 DIAGNOSIS — F41.9 ANXIETY AND DEPRESSION: ICD-10-CM

## 2024-01-03 DIAGNOSIS — E66.9 CLASS 1 OBESITY WITH SERIOUS COMORBIDITY AND BODY MASS INDEX (BMI) OF 31.0 TO 31.9 IN ADULT, UNSPECIFIED OBESITY TYPE: ICD-10-CM

## 2024-01-03 DIAGNOSIS — E78.5 HYPERLIPIDEMIA, UNSPECIFIED HYPERLIPIDEMIA TYPE: ICD-10-CM

## 2024-01-03 DIAGNOSIS — F32.A ANXIETY AND DEPRESSION: ICD-10-CM

## 2024-01-03 DIAGNOSIS — K76.0 FATTY LIVER: ICD-10-CM

## 2024-01-03 DIAGNOSIS — E88.819 INSULIN RESISTANCE: ICD-10-CM

## 2024-01-03 DIAGNOSIS — Z51.81 ENCOUNTER FOR THERAPEUTIC DRUG LEVEL MONITORING: Primary | ICD-10-CM

## 2024-01-03 DIAGNOSIS — I10 HYPERTENSION, UNSPECIFIED TYPE: ICD-10-CM

## 2024-01-03 PROCEDURE — 3008F BODY MASS INDEX DOCD: CPT | Performed by: PHYSICIAN ASSISTANT

## 2024-01-03 PROCEDURE — 99214 OFFICE O/P EST MOD 30 MIN: CPT | Performed by: PHYSICIAN ASSISTANT

## 2024-01-03 PROCEDURE — 3077F SYST BP >= 140 MM HG: CPT | Performed by: PHYSICIAN ASSISTANT

## 2024-01-03 PROCEDURE — 3080F DIAST BP >= 90 MM HG: CPT | Performed by: PHYSICIAN ASSISTANT

## 2024-01-03 RX ORDER — METFORMIN HYDROCHLORIDE 750 MG/1
750 TABLET, EXTENDED RELEASE ORAL DAILY
Qty: 90 TABLET | Refills: 0 | Status: SHIPPED | OUTPATIENT
Start: 2024-01-03

## 2024-01-03 RX ORDER — TIRZEPATIDE 2.5 MG/.5ML
2.5 INJECTION, SOLUTION SUBCUTANEOUS WEEKLY
Qty: 2 ML | Refills: 0 | Status: SHIPPED | OUTPATIENT
Start: 2024-01-03

## 2024-01-03 NOTE — PROGRESS NOTES
HISTORY OF PRESENT ILLNESS  Chief Complaint   Patient presents with    Weight Check     -7lbs       Audelia Calvo is a 43 year old female here for follow up in medical weight loss program.   -7lbs  Compliant on metformin  Denies chest pain, shortness of breath, dizziness, blurred vision, headache, paresthesia, nausea/vomiting.   The weight is not going down as fast as she would like it too  But she is feeling better, sleeping better, more energy  Cut out soda  Decrease cravings for sweets, not doing pasta, feels like making better choices all around    Exercise/Activity: 4-5 days a week, strength or cardio, pilates  Nutrition: 24 hour food log reviewed, eating regular meals, +protein  Stress: always high, feels like managing ok and not emotional eating  Sleep: improving, not waking up and snacking      Wt Readings from Last 6 Encounters:   01/03/24 185 lb (83.9 kg)   09/07/23 192 lb (87.1 kg)   07/13/23 202 lb (91.6 kg)   06/26/23 200 lb (90.7 kg)   05/22/23 200 lb (90.7 kg)   11/28/22 201 lb (91.2 kg)            Breakfast Lunch Dinner Snacks Fluids   Reviewed           REVIEW OF SYSTEMS  GENERAL HEALTH: feels well otherwise, denied any fevers chills or night sweats   RESPIRATORY: denies shortness of breath   CARDIOVASCULAR: denies chest pain  GI: denies abdominal pain    EXAM  /90   Pulse 78   Resp 18   Ht 5' 3.78\" (1.62 m)   Wt 185 lb (83.9 kg)   LMP 06/04/2014   SpO2 97%   BMI 31.97 kg/m²   GENERAL: well developed, well nourished,in no apparent distress, A/O x3  SKIN: no rashes,no suspicious lesions  HEENT: atraumatic, normocephalic, OP-clear, PERRL  NECK: supple,no adenopathy  LUNGS: clear to auscultation bilaterally   CARDIO: RRR without murmur  GI: good BS's,NT/ND, no masses or HSM  EXTREMITIES: no cyanosis, no clubbing, no edema    Lab Results   Component Value Date    WBC 7.6 06/07/2023    RBC 4.43 06/07/2023    HGB 13.8 07/13/2023    HCT 44.3 06/07/2023    .0 06/07/2023    MCH 34.3  (H) 06/07/2023    MCHC 34.3 06/07/2023    RDW 12.6 06/07/2023    .0 06/07/2023    MPV 11.0 02/08/2013     Lab Results   Component Value Date     (H) 07/25/2020    BUN 12 07/25/2020    BUNCREA 14.5 07/25/2020    CREATSERUM 0.83 07/25/2020    ANIONGAP 3 07/25/2020    GFR 88 12/12/2017    GFRNAA 87 07/25/2020    GFRAA 101 07/25/2020    CA 9.3 07/25/2020    OSMOCALC 286 07/25/2020    ALKPHO 82 07/25/2020    AST 31 07/25/2020    ALT 52 07/25/2020    BILT 0.4 07/25/2020    TP 7.7 07/25/2020    ALB 4.3 07/25/2020    GLOBULIN 3.4 07/25/2020    AGRATIO 2.1 02/18/2012     07/25/2020    K 4.3 07/25/2020     07/25/2020    CO2 28.0 07/25/2020     Lab Results   Component Value Date     10/12/2018    A1C 5.3 10/12/2018     Lab Results   Component Value Date    CHOLEST 191 11/29/2022    TRIG 286 (H) 11/29/2022    HDL 47 11/29/2022    LDL 96 11/29/2022    VLDL 48 (H) 11/29/2022    TCHDLRATIO 2.58 12/12/2017    NONHDLC 144 (H) 11/29/2022     Lab Results   Component Value Date    T4F 1.0 11/29/2022    TSH 2.640 11/29/2022     Lab Results   Component Value Date    B12 360 07/25/2020    VITB12 420 03/22/2016     Lab Results   Component Value Date    VITD 30.3 06/07/2023       Current Outpatient Medications on File Prior to Visit   Medication Sig Dispense Refill    ALPRAZolam 0.25 MG Oral Tab Take 1 tablet (0.25 mg total) by mouth daily as needed for Anxiety. 30 tablet 0    escitalopram 20 MG Oral Tab Take 1 tablet (20 mg total) by mouth daily. 90 tablet 0    metoprolol succinate ER 25 MG Oral Tablet 24 Hr Take 2 tablets by mouth in AM & 1 tablet by mouth at night. (For a 75mg total every day) 90 tablet 0    CHLORTHALIDONE 25 MG Oral Tab TAKE 1 TABLET (25 MG TOTAL) BY MOUTH DAILY. 90 tablet 1    ondansetron (ZOFRAN) 4 mg tablet Take 1 tablet (4 mg total) by mouth every 8 (eight) hours as needed for Nausea. 20 tablet 1    Acetaminophen 500 MG Oral Cap Take 2 capsules (1,000 mg total) by mouth one time.       hydrOXYzine 25 MG Oral Tab Take 1 tablet (25 mg total) by mouth every 8 (eight) hours as needed for Itching. (Patient not taking: Reported on 1/3/2024) 30 tablet 1     No current facility-administered medications on file prior to visit.       ASSESSMENT  Analyzed weight data:       Diagnoses and all orders for this visit:    Encounter for therapeutic drug level monitoring    Class 1 obesity with serious comorbidity and body mass index (BMI) of 31.0 to 31.9 in adult, unspecified obesity type    Insulin resistance    Hyperlipidemia, unspecified hyperlipidemia type    Fatty liver    Hypertension, unspecified type    Anxiety and depression    Other orders  -     metFORMIN  MG Oral Tablet 24 Hr; Take 1 tablet (750 mg total) by mouth daily.  -     Tirzepatide-Weight Management (ZEPBOUND) 2.5 MG/0.5ML Subcutaneous Solution Auto-injector; Inject 2.5 mg into the skin once a week.        PLAN  Initial Weight: 192 lbs  Initial Weight Date: 09/07/23  Today's Weight: 185 lbs  5% Goal: 9.6  10% Goal: 19.2  Total Weight Loss: Net loss 7 lbs    Will continue metformin - advised side effects and adverse effects of medication   Will begin Zepbound 2.5mg weekly - denies any personal or family history of pancreatitis, pancreatic cancer, thyroid cancer, MEN2 - discussed MOA, advised side effects and adverse effects of medication.  Insulin resistance/IFG - continue current medications, low carb diet  Fatty liver - low carb, low fat diet  HTN - blood pressure controlled on current medication, elevated in office today, pt to monitor at home, discussed red flag signs and symptoms and to call 911 or go to ER- advised signs and symptoms of hypotension and will monitor with continued weight loss  HLD - lifestyle changes  Mood is stable on current medications  Consistency with logging foods - protein and produce  Nutrition: low carb diet/ recommended to eat breakfast daily/ regular protein intake  Medication use and side effects  reviewed with patient.  Medication contraindications: EKG prior to stimulant, mother thyroid cancer - incidental papillary after partial thyroidectomy due to nodules  Follow up with dietitian and psychologist as recommended.  Discussed the role of sleep and stress in weight management.  Counseled on comprehensive weight loss plan including attention to nutrition, exercise and behavior/stress management for success. See patient instruction below for more details.  Discussed strategies to overcome barriers to successful weight loss and weight maintenance  FITTE: ACSM recommendations (150-300 minutes/ week in active weight loss)   Total time spent on chart review, pre-charting, obtaining history, counseling, and educating, reviewing labs was 30 minutes.  Weight Loss consent to treat reviewed and signed       NOTE TO PATIENT: The 21st Century Cures Act makes clinical notes like these available to patients in the interest of transparency. Clinical notes are medical documents used by physicians and care providers to communicate with each other. These documents include medical language and terminology, abbreviations, and treatment information that may sound technical and at times possibly unfamiliar. In addition, at times, the verbiage may appear blunt or direct. These documents are one tool providers use to communicate relevant information and clinical opinions of the care providers in a way that allows common understanding of the clinical context.     There are no Patient Instructions on file for this visit.    No follow-ups on file.    Patient verbalizes understanding.    France Dillard PA-C  1/3/2024

## 2024-01-24 DIAGNOSIS — F41.1 GAD (GENERALIZED ANXIETY DISORDER): ICD-10-CM

## 2024-01-24 DIAGNOSIS — F43.0 ACUTE STRESS REACTION: ICD-10-CM

## 2024-01-25 NOTE — TELEPHONE ENCOUNTER
Medication(s) to Refill:   Requested Prescriptions     Pending Prescriptions Disp Refills    escitalopram 20 MG Oral Tab 90 tablet 0     Sig: Take 1 tablet (20 mg total) by mouth daily.         Reason for Medication Refill being sent to Provider / Reason Protocol Failed:  [] 90 day refill has already been granted  [] Blood Pressure out of range  [] Labs Abnormal/over due  [] Medication not previously prescribed by Provider  [] Non-Protocol Medication  [] Controlled Substance   [] Due for appointment- no future appointment scheduled  [] No Follow up specified      Last Time Medication was Filled:  9/19/23      Last Office Visit with PCP: 6/26/23    When Patient was Due Back to the Office:    (from when PCP last addressed condition)    Future Appointments:  Future Appointments   Date Time Provider Department Center   4/17/2024  3:20 PM France Dillard PA-C [facility] [address]         Last Blood Pressures:  BP Readings from Last 2 Encounters:   01/03/24 146/90   09/07/23 118/70         Action taken:  [] Refill approved per protocol  [] Routing to provider for approval

## 2024-01-26 RX ORDER — ESCITALOPRAM OXALATE 20 MG/1
20 TABLET ORAL DAILY
Qty: 90 TABLET | Refills: 0 | Status: SHIPPED | OUTPATIENT
Start: 2024-01-26

## 2024-02-01 ENCOUNTER — OFFICE VISIT (OUTPATIENT)
Dept: FAMILY MEDICINE CLINIC | Facility: CLINIC | Age: 44
End: 2024-02-01
Payer: COMMERCIAL

## 2024-02-01 VITALS
DIASTOLIC BLOOD PRESSURE: 84 MMHG | OXYGEN SATURATION: 99 % | WEIGHT: 180 LBS | BODY MASS INDEX: 30.73 KG/M2 | RESPIRATION RATE: 16 BRPM | HEART RATE: 85 BPM | HEIGHT: 64 IN | TEMPERATURE: 97 F | SYSTOLIC BLOOD PRESSURE: 131 MMHG

## 2024-02-01 DIAGNOSIS — R50.9 FEVER, UNSPECIFIED FEVER CAUSE: ICD-10-CM

## 2024-02-01 DIAGNOSIS — J11.1 INFLUENZA-LIKE ILLNESS: Primary | ICD-10-CM

## 2024-02-01 LAB
OPERATOR ID: NORMAL
POCT LOT NUMBER: NORMAL
RAPID SARS-COV-2 BY PCR: NOT DETECTED

## 2024-02-01 PROCEDURE — U0002 COVID-19 LAB TEST NON-CDC: HCPCS | Performed by: NURSE PRACTITIONER

## 2024-02-01 PROCEDURE — 3008F BODY MASS INDEX DOCD: CPT | Performed by: NURSE PRACTITIONER

## 2024-02-01 PROCEDURE — 99213 OFFICE O/P EST LOW 20 MIN: CPT | Performed by: NURSE PRACTITIONER

## 2024-02-01 PROCEDURE — 3075F SYST BP GE 130 - 139MM HG: CPT | Performed by: NURSE PRACTITIONER

## 2024-02-01 PROCEDURE — 87637 SARSCOV2&INF A&B&RSV AMP PRB: CPT | Performed by: NURSE PRACTITIONER

## 2024-02-01 PROCEDURE — 3079F DIAST BP 80-89 MM HG: CPT | Performed by: NURSE PRACTITIONER

## 2024-02-01 RX ORDER — OSELTAMIVIR PHOSPHATE 75 MG/1
75 CAPSULE ORAL 2 TIMES DAILY
Qty: 10 CAPSULE | Refills: 0 | Status: SHIPPED | OUTPATIENT
Start: 2024-02-01 | End: 2024-02-06

## 2024-02-01 NOTE — PATIENT INSTRUCTIONS
Take Tamiflu as prescribed  Await the results for the Quad swab panel test- if Influenza negative you can stop Tamiflu medication  Tylenol and Ibuprofen as needed  Push fluids    I recommend the 4 H's for inflammation:    1. Heat (warm mist from the shower or warm liquids such as tea)  2. Honey (mixed in your tea or by the spoonful [if you are not diabetic; over the age of 1 year]--take a spoonful 3 times a day and don't eat or drink anything for 15-20 minutes)  3. Humidity--cool mist in the bedroom at night  4. Hydration --at least 8 -10 glasses a day

## 2024-02-01 NOTE — PROGRESS NOTES
CHIEF COMPLAINT:     Chief Complaint   Patient presents with    Cold     Severe headache, fever up to 101, congestion, symptoms started yesterday        HPI:   Audelia Calvo is a 43 year old female who presents for upper respiratory symptoms for  1 days. Patient reports congestion, fever with Tmax to 101F, sinus pain. Symptoms have been worsening since onset.  Treating symptoms with Sudafed and Tylenol.      Current Outpatient Medications   Medication Sig Dispense Refill    oseltamivir 75 MG Oral Cap Take 1 capsule (75 mg total) by mouth 2 (two) times daily for 5 days. 10 capsule 0    escitalopram 20 MG Oral Tab Take 1 tablet (20 mg total) by mouth daily. 90 tablet 0    metFORMIN  MG Oral Tablet 24 Hr Take 1 tablet (750 mg total) by mouth daily. 90 tablet 0    Tirzepatide-Weight Management (ZEPBOUND) 2.5 MG/0.5ML Subcutaneous Solution Auto-injector Inject 2.5 mg into the skin once a week. 2 mL 0    ALPRAZolam 0.25 MG Oral Tab Take 1 tablet (0.25 mg total) by mouth daily as needed for Anxiety. 30 tablet 0    metoprolol succinate ER 25 MG Oral Tablet 24 Hr Take 2 tablets by mouth in AM & 1 tablet by mouth at night. (For a 75mg total every day) 90 tablet 0    CHLORTHALIDONE 25 MG Oral Tab TAKE 1 TABLET (25 MG TOTAL) BY MOUTH DAILY. 90 tablet 1    ondansetron (ZOFRAN) 4 mg tablet Take 1 tablet (4 mg total) by mouth every 8 (eight) hours as needed for Nausea. 20 tablet 1    Acetaminophen 500 MG Oral Cap Take 2 capsules (1,000 mg total) by mouth one time.      hydrOXYzine 25 MG Oral Tab Take 1 tablet (25 mg total) by mouth every 8 (eight) hours as needed for Itching. (Patient not taking: Reported on 1/3/2024) 30 tablet 1      Past Medical History:   Diagnosis Date    JACKSON favor benign 6/2012    Allergic rhinitis     Anxiety state     Back problem     Blood disorder     HEMOCHROMATOSIS    Dysplasia of cervix, low grade (DEE 1) 06/19/2012    Hemochromatosis     IBS     Trouble in sleeping     Visual impairment      GLASSES      Past Surgical History:   Procedure Laterality Date    BACK SURGERY  10/23/2018    COLONOSCOPY      normal age 30, fathers sister with colon cancer age 45    COLONOSCOPY,DIAGNOSTIC  2011    Performed by CAMERON LUNA at [facility], [facility]    COLPOSCOPY, CERVIX W/UPPER ADJACENT VAGINA; W/ENDOCERVICAL CURETTAGE      cervicitis    DILATION/CURETTAGE,DIAGNOSTIC      removal benign polyps    ENDOMETRIAL ABLATION  10/21/2013    ENDOMETRIAL BX CONJUNCT W/COLPOSCOPY      HYSTERECTOMY  2014    partial, still has ovaries      ,          Social History     Socioeconomic History    Marital status:    Tobacco Use    Smoking status: Never    Smokeless tobacco: Never   Vaping Use    Vaping Use: Never used   Substance and Sexual Activity    Alcohol use: Yes     Alcohol/week: 0.0 - 1.0 standard drinks of alcohol     Comment: Socially    Drug use: No    Sexual activity: Yes     Partners: Male     Birth control/protection: Vasectomy, Hysterectomy   Other Topics Concern    Caffeine Concern Yes     Comment: 1-2 cups coffee    Exercise No    Seat Belt No    Special Diet No    Stress Concern No    Weight Concern Yes     Comment: Unable to lose weight.         REVIEW OF SYSTEMS:   GENERAL: good appetite  SKIN: no rashes or abnormal skin lesions  HEENT: See HPI  LUNGS: See HPI  CARDIOVASCULAR: denies chest pain or palpitations         EXAM:   /84   Pulse 85   Temp 96.8 °F (36 °C) (Temporal)   Resp 16   Ht 5' 4\" (1.626 m)   Wt 180 lb (81.6 kg)   LMP 2014   SpO2 99%   BMI 30.90 kg/m²   GENERAL: well developed, well nourished,in no apparent distress  SKIN: no rashes,no suspicious lesions  HEAD: atraumatic, normocephalic.  no tenderness on palpation of frontal and maxillary sinuses  EYES: conjunctiva clear, EOM intact  EARS: TM's grey and no erythema, no bulging, nor retraction,no fluid, bony landmarks visible  NOSE: Nostrils patent, clear nasal discharge, nasal  mucosa moist   THROAT: Oral mucosa pink, moist. Posterior pharynx is mildly erythematous. no exudates. Tonsils 2/4.    NECK: Supple, non-tender  LUNGS: clear to auscultation bilaterally, no wheezes or rhonchi. Breathing is non labored.  CARDIO: RRR without murmur  EXTREMITIES: no cyanosis, clubbing or edema  LYMPH:  no lymphadenopathy.      Recent Results (from the past 24 hour(s))   Rapid Covid-19    Collection Time: 02/01/24 12:13 PM    Specimen: Nares   Result Value Ref Range    Rapid SARS-CoV-2 by PCR Not Detected Not Detected    POCT Lot Number [id number]     POCT Expiration Date 10/7/25     POCT Procedure Control Control Valid Control Valid     ,319           ASSESSMENT AND PLAN:   Audelia Calvo is a 43 year old female who presents with upper respiratory symptoms that are consistent with viral syndrome    ASSESSMENT:   Encounter Diagnosis   Name Primary?    Fever, unspecified fever cause Yes       PLAN: Meds as below.  Comfort care as described in Patient Instructions  Take Oseltamivir as prescribed.  Await Quad panel swab results, will stop Tamiflu if influenza negative.  Tylenol and Ibuprofen as needed for discomfort    Meds & Refills for this Visit:  Requested Prescriptions     Signed Prescriptions Disp Refills    oseltamivir 75 MG Oral Cap 10 capsule 0     Sig: Take 1 capsule (75 mg total) by mouth 2 (two) times daily for 5 days.     Risks, benefits, and side effects of medication explained and discussed.    The patient indicates understanding of these issues and agrees to the plan.  The patient is asked to f/u with PCP if sx's persist or worsen.  There are no Patient Instructions on file for this visit.

## 2024-02-02 LAB
FLUAV + FLUBV RNA SPEC NAA+PROBE: NOT DETECTED
FLUAV + FLUBV RNA SPEC NAA+PROBE: NOT DETECTED
RSV RNA SPEC NAA+PROBE: NOT DETECTED
SARS-COV-2 RNA RESP QL NAA+PROBE: NOT DETECTED

## 2024-02-28 ENCOUNTER — TELEPHONE (OUTPATIENT)
Dept: INTERNAL MEDICINE CLINIC | Facility: CLINIC | Age: 44
End: 2024-02-28

## 2024-02-28 NOTE — TELEPHONE ENCOUNTER
PA needed for zepbound 2.5 mg  Could not be done in epic  Could not be done in [facility]  I called [employer] - she is listed under [facility] they are faxing form.  Formulary exception

## 2024-02-29 DIAGNOSIS — G47.09 OTHER INSOMNIA: ICD-10-CM

## 2024-03-01 RX ORDER — ALPRAZOLAM 0.25 MG/1
0.25 TABLET ORAL
Qty: 30 TABLET | Refills: 0 | OUTPATIENT
Start: 2024-03-01

## 2024-03-02 DIAGNOSIS — G47.09 OTHER INSOMNIA: ICD-10-CM

## 2024-03-04 RX ORDER — ALPRAZOLAM 0.25 MG/1
0.25 TABLET ORAL
Qty: 30 TABLET | Refills: 0 | Status: SHIPPED | OUTPATIENT
Start: 2024-03-04

## 2024-03-11 NOTE — TELEPHONE ENCOUNTER
[employer] sent back denial of coverage for Zepbound 2.5 mg  [id number]    NO COVERAGE FOR WEIGHT LOSS DRUGS

## 2024-03-15 RX ORDER — SEMAGLUTIDE 0.68 MG/ML
INJECTION, SOLUTION SUBCUTANEOUS
Qty: 9 ML | Refills: 0 | Status: SHIPPED | OUTPATIENT
Start: 2024-03-15

## 2024-03-22 ENCOUNTER — PATIENT MESSAGE (OUTPATIENT)
Dept: FAMILY MEDICINE CLINIC | Facility: CLINIC | Age: 44
End: 2024-03-22

## 2024-03-22 DIAGNOSIS — N64.52 BLOODY DISCHARGE FROM NIPPLE: Primary | ICD-10-CM

## 2024-03-22 DIAGNOSIS — N64.4 BREAST PAIN: ICD-10-CM

## 2024-03-25 NOTE — TELEPHONE ENCOUNTER
Pt sent message c/o bloody discharge to left nipple and pain in breast. Pt requesting for diagnostic mammogram and breast surgeon. Okay for orders?   LOV: 06/26/23

## 2024-03-25 NOTE — TELEPHONE ENCOUNTER
From: Audelia Calvo  To: Deondre Anguiano  Sent: 3/22/2024 8:09 PM CDT  Subject: Non-Urgent Medical Question    Hi Dr. Anguiano,    I'm having bloody discharge from my left nipple and focal pain near the 9:00-12:00 location in my right breast. Are you able to place orders for a bilateral diagnostic mammogram and a bilateral diagnostic complete breast ultrasound? I would also like to see a breast surgeon after having my imaging. Do you have anyone you would recommend?    Thank You,  Audelia

## 2024-03-31 DIAGNOSIS — R00.2 HEART PALPITATIONS: ICD-10-CM

## 2024-04-02 RX ORDER — METOPROLOL SUCCINATE 25 MG/1
TABLET, EXTENDED RELEASE ORAL
Qty: 90 TABLET | Refills: 0 | Status: SHIPPED | OUTPATIENT
Start: 2024-04-02

## 2024-04-05 ENCOUNTER — PATIENT MESSAGE (OUTPATIENT)
Dept: FAMILY MEDICINE CLINIC | Facility: CLINIC | Age: 44
End: 2024-04-05

## 2024-04-05 ENCOUNTER — HOSPITAL ENCOUNTER (OUTPATIENT)
Dept: MAMMOGRAPHY | Facility: HOSPITAL | Age: 44
Discharge: HOME | End: 2024-04-05
Attending: FAMILY MEDICINE
Payer: COMMERCIAL

## 2024-04-05 DIAGNOSIS — N64.52 BLOODY DISCHARGE FROM NIPPLE: ICD-10-CM

## 2024-04-05 DIAGNOSIS — N64.52 BLOODY DISCHARGE FROM NIPPLE: Primary | ICD-10-CM

## 2024-04-05 DIAGNOSIS — N64.4 BREAST PAIN: ICD-10-CM

## 2024-04-05 PROCEDURE — 77066 DX MAMMO INCL CAD BI: CPT | Performed by: FAMILY MEDICINE

## 2024-04-05 PROCEDURE — 76641 ULTRASOUND BREAST COMPLETE: CPT | Performed by: FAMILY MEDICINE

## 2024-04-05 PROCEDURE — 77062 BREAST TOMOSYNTHESIS BI: CPT | Performed by: FAMILY MEDICINE

## 2024-04-05 NOTE — TELEPHONE ENCOUNTER
From: Audelia Calvo  To: Deodnre Anguiano  Sent: 4/5/2024 11:59 AM CDT  Subject: Test Results Question    Hi Dr. Anguiano,  I had my mammogram and ultrasound today and the radiologist is recommending a breast MRI due to the bloody nipple discharge. Could you please place that order for me?  Thank You!

## 2024-04-05 NOTE — TELEPHONE ENCOUNTER
Per mammogram results, radiologist recommends bilateral breast MRI for bloody discharge from nipples. Ok to order?

## 2024-04-10 ENCOUNTER — HOSPITAL ENCOUNTER (OUTPATIENT)
Dept: MRI IMAGING | Facility: HOSPITAL | Age: 44
Discharge: HOME | End: 2024-04-10
Attending: FAMILY MEDICINE
Payer: COMMERCIAL

## 2024-04-10 DIAGNOSIS — N64.52 BLOODY DISCHARGE FROM NIPPLE: ICD-10-CM

## 2024-04-10 PROCEDURE — A9575 INJ GADOTERATE MEGLUMI 0.1ML: HCPCS

## 2024-04-10 PROCEDURE — 77049 MRI BREAST C-+ W/CAD BI: CPT | Performed by: FAMILY MEDICINE

## 2024-04-10 RX ORDER — GADOTERATE MEGLUMINE 376.9 MG/ML
20 INJECTION INTRAVENOUS
Status: COMPLETED | OUTPATIENT
Start: 2024-04-10 | End: 2024-04-10

## 2024-04-10 RX ADMIN — GADOTERATE MEGLUMINE 17 ML: 376.9 INJECTION INTRAVENOUS at 15:56:00

## 2024-04-11 ENCOUNTER — TELEPHONE (OUTPATIENT)
Dept: FAMILY MEDICINE CLINIC | Facility: CLINIC | Age: 44
End: 2024-04-11

## 2024-04-11 NOTE — TELEPHONE ENCOUNTER
Rcvd fax from Mammography Dept recommending an MRI bilateral (with and without contrast) form for Dr Anguiano to sign.   I faxed with confirmation today. I will keep form in brown file folder near triage room.

## 2024-04-17 ENCOUNTER — OFFICE VISIT (OUTPATIENT)
Facility: CLINIC | Age: 44
End: 2024-04-17
Payer: COMMERCIAL

## 2024-04-17 VITALS
OXYGEN SATURATION: 96 % | SYSTOLIC BLOOD PRESSURE: 132 MMHG | BODY MASS INDEX: 30.73 KG/M2 | DIASTOLIC BLOOD PRESSURE: 86 MMHG | HEART RATE: 78 BPM | RESPIRATION RATE: 16 BRPM | HEIGHT: 64 IN | WEIGHT: 180 LBS

## 2024-04-17 DIAGNOSIS — E66.9 CLASS 1 OBESITY WITH SERIOUS COMORBIDITY AND BODY MASS INDEX (BMI) OF 30.0 TO 30.9 IN ADULT, UNSPECIFIED OBESITY TYPE: ICD-10-CM

## 2024-04-17 DIAGNOSIS — E88.819 INSULIN RESISTANCE: ICD-10-CM

## 2024-04-17 DIAGNOSIS — K76.0 FATTY LIVER: ICD-10-CM

## 2024-04-17 DIAGNOSIS — E78.5 HYPERLIPIDEMIA, UNSPECIFIED HYPERLIPIDEMIA TYPE: ICD-10-CM

## 2024-04-17 DIAGNOSIS — F41.9 ANXIETY AND DEPRESSION: ICD-10-CM

## 2024-04-17 DIAGNOSIS — F32.A ANXIETY AND DEPRESSION: ICD-10-CM

## 2024-04-17 DIAGNOSIS — I10 HYPERTENSION, UNSPECIFIED TYPE: ICD-10-CM

## 2024-04-17 DIAGNOSIS — Z51.81 ENCOUNTER FOR THERAPEUTIC DRUG LEVEL MONITORING: Primary | ICD-10-CM

## 2024-04-17 PROCEDURE — 3075F SYST BP GE 130 - 139MM HG: CPT | Performed by: PHYSICIAN ASSISTANT

## 2024-04-17 PROCEDURE — 3079F DIAST BP 80-89 MM HG: CPT | Performed by: PHYSICIAN ASSISTANT

## 2024-04-17 PROCEDURE — 99214 OFFICE O/P EST MOD 30 MIN: CPT | Performed by: PHYSICIAN ASSISTANT

## 2024-04-17 PROCEDURE — 3008F BODY MASS INDEX DOCD: CPT | Performed by: PHYSICIAN ASSISTANT

## 2024-04-17 RX ORDER — TOPIRAMATE 25 MG/1
25 TABLET ORAL 2 TIMES DAILY
Qty: 180 TABLET | Refills: 0 | Status: SHIPPED | OUTPATIENT
Start: 2024-04-17

## 2024-04-17 RX ORDER — METFORMIN HYDROCHLORIDE 750 MG/1
1500 TABLET, EXTENDED RELEASE ORAL DAILY
Qty: 180 TABLET | Refills: 0 | Status: SHIPPED | OUTPATIENT
Start: 2024-04-17

## 2024-04-17 NOTE — PROGRESS NOTES
HISTORY OF PRESENT ILLNESS  Chief Complaint   Patient presents with    Weight Check     -5lbs       Audelia Calvo is a 43 year old female here for follow up in medical weight loss program.   -5lbs  Compliant on metformin  Denies chest pain, shortness of breath, dizziness, blurred vision, headache, paresthesia, nausea/vomiting.   In the mornings has to force herself to eat something  Is not craving sweets like she was before  Struggling more with night time eating  Exercise/Activity: more walking with the change in weather, pilates  Nutrition: 24 hour food log reviewed, eating regular meals, +protein  Stress: 10/10 - feels like managing ok  Sleep: 5 hours/night       Wt Readings from Last 6 Encounters:   04/17/24 180 lb (81.6 kg)   02/01/24 180 lb (81.6 kg)   01/03/24 185 lb (83.9 kg)   09/07/23 192 lb (87.1 kg)   07/13/23 202 lb (91.6 kg)   06/26/23 200 lb (90.7 kg)            Breakfast Lunch Dinner Snacks Fluids   Reviewed           REVIEW OF SYSTEMS  GENERAL HEALTH: feels well otherwise, denied any fevers chills or night sweats   RESPIRATORY: denies shortness of breath   CARDIOVASCULAR: denies chest pain  GI: denies abdominal pain    EXAM  /86   Pulse 78   Resp 16   Ht 5' 4\" (1.626 m)   Wt 180 lb (81.6 kg)   LMP 06/04/2014   SpO2 96%   BMI 30.90 kg/m²   GENERAL: well developed, well nourished,in no apparent distress, A/O x3  SKIN: no rashes,no suspicious lesions  HEENT: atraumatic, normocephalic, OP-clear, PERRL  NECK: supple,no adenopathy  LUNGS: clear to auscultation bilaterally   CARDIO: RRR without murmur  EXTREMITIES: no cyanosis, no clubbing, no edema    Lab Results   Component Value Date    WBC 7.6 06/07/2023    RBC 4.43 06/07/2023    HGB 13.8 07/13/2023    HCT 44.3 06/07/2023    .0 06/07/2023    MCH 34.3 (H) 06/07/2023    MCHC 34.3 06/07/2023    RDW 12.6 06/07/2023    .0 06/07/2023    MPV 11.0 02/08/2013     Lab Results   Component Value Date     (H) 07/25/2020    BUN  12 07/25/2020    BUNCREA 14.5 07/25/2020    CREATSERUM 0.83 07/25/2020    ANIONGAP 3 07/25/2020    GFR 88 12/12/2017    GFRNAA 87 07/25/2020    GFRAA 101 07/25/2020    CA 9.3 07/25/2020    OSMOCALC 286 07/25/2020    ALKPHO 82 07/25/2020    AST 31 07/25/2020    ALT 52 07/25/2020    BILT 0.4 07/25/2020    TP 7.7 07/25/2020    ALB 4.3 07/25/2020    GLOBULIN 3.4 07/25/2020    AGRATIO 2.1 02/18/2012     07/25/2020    K 4.3 07/25/2020     07/25/2020    CO2 28.0 07/25/2020     Lab Results   Component Value Date     10/12/2018    A1C 5.3 10/12/2018     Lab Results   Component Value Date    CHOLEST 191 11/29/2022    TRIG 286 (H) 11/29/2022    HDL 47 11/29/2022    LDL 96 11/29/2022    VLDL 48 (H) 11/29/2022    TCHDLRATIO 2.58 12/12/2017    NONHDLC 144 (H) 11/29/2022     Lab Results   Component Value Date    T4F 1.0 11/29/2022    TSH 2.640 11/29/2022     Lab Results   Component Value Date    B12 360 07/25/2020    VITB12 420 03/22/2016     Lab Results   Component Value Date    VITD 30.3 06/07/2023       Current Outpatient Medications on File Prior to Visit   Medication Sig Dispense Refill    metoprolol succinate ER 25 MG Oral Tablet 24 Hr Take 2 tablets by mouth in AM & 1 tablet by mouth at night. (For a 75mg total every day) 90 tablet 0    ALPRAZolam 0.25 MG Oral Tab Take 1 tablet (0.25 mg total) by mouth daily as needed for Anxiety. 30 tablet 0    escitalopram 20 MG Oral Tab Take 1 tablet (20 mg total) by mouth daily. 90 tablet 0    hydrOXYzine 25 MG Oral Tab Take 1 tablet (25 mg total) by mouth every 8 (eight) hours as needed for Itching. 30 tablet 1    CHLORTHALIDONE 25 MG Oral Tab TAKE 1 TABLET (25 MG TOTAL) BY MOUTH DAILY. 90 tablet 1    ondansetron (ZOFRAN) 4 mg tablet Take 1 tablet (4 mg total) by mouth every 8 (eight) hours as needed for Nausea. 20 tablet 1    Acetaminophen 500 MG Oral Cap Take 2 capsules (1,000 mg total) by mouth one time. (Patient not taking: Reported on 4/17/2024)       No current  facility-administered medications on file prior to visit.       ASSESSMENT  Analyzed weight data:       Diagnoses and all orders for this visit:    Encounter for therapeutic drug level monitoring    Class 1 obesity with serious comorbidity and body mass index (BMI) of 30.0 to 30.9 in adult, unspecified obesity type    Insulin resistance    Hyperlipidemia, unspecified hyperlipidemia type    Fatty liver    Hypertension, unspecified type    Anxiety and depression    Other orders  -     topiramate 25 MG Oral Tab; Take 1 tablet (25 mg total) by mouth 2 (two) times daily.  -     metFORMIN  MG Oral Tablet 24 Hr; Take 2 tablets (1,500 mg total) by mouth daily.        PLAN  Initial Weight: 192 lbs  Initial Weight Date: 09/07/23  Today's Weight: 180 lbs  5% Goal: 9.6  10% Goal: 19.2  Total Weight Loss: Down 5lbs/Net loss 12 lbs    Will continue metformin - advised side effects and adverse effects of medication   Begin topamax - discussed MOA, advised side effects and adverse effects of medication, denies h/o kidney stones or glaucoma  HTN - blood pressure well controlled on current medication - advised signs and symptoms of hypotension and will monitor with continued weight loss  Fatty liver - low carb, low fat diet  Mood is stable, continue to work on stress management  HLD - lifestyle changes  Increase protein at lunch, consistency with logging foods  Discussed ways to incorporate more strength/resistance training, 7 min workout rafaela, [employer] rafaela  Mindful eating in the evenings  Nutrition: low carb diet/ recommended to eat breakfast daily/ regular protein intake  Medication use and side effects reviewed with patient.  Medication contraindications: Medication contraindications: EKG prior to stimulant, mother thyroid cancer - incidental papillary after partial thyroidectomy due to nodules   Follow up with dietitian and psychologist as recommended.  Discussed the role of sleep and stress in weight management.  Counseled  on comprehensive weight loss plan including attention to nutrition, exercise and behavior/stress management for success. See patient instruction below for more details.  Discussed strategies to overcome barriers to successful weight loss and weight maintenance  FITTE: ACSM recommendations (150-300 minutes/ week in active weight loss)   Weight Loss consent to treat reviewed and signed       NOTE TO PATIENT: The 21st Century Cures Act makes clinical notes like these available to patients in the interest of transparency. Clinical notes are medical documents used by physicians and care providers to communicate with each other. These documents include medical language and terminology, abbreviations, and treatment information that may sound technical and at times possibly unfamiliar. In addition, at times, the verbiage may appear blunt or direct. These documents are one tool providers use to communicate relevant information and clinical opinions of the care providers in a way that allows common understanding of the clinical context.     There are no Patient Instructions on file for this visit.    No follow-ups on file.    Patient verbalizes understanding.    France Dillard PA-C  4/17/2024

## 2024-05-10 ENCOUNTER — TELEPHONE (OUTPATIENT)
Dept: HEMATOLOGY/ONCOLOGY | Facility: HOSPITAL | Age: 44
End: 2024-05-10

## 2024-05-22 DIAGNOSIS — G47.09 OTHER INSOMNIA: ICD-10-CM

## 2024-05-22 DIAGNOSIS — R51.9 ACUTE NONINTRACTABLE HEADACHE, UNSPECIFIED HEADACHE TYPE: ICD-10-CM

## 2024-05-22 DIAGNOSIS — R00.2 HEART PALPITATIONS: ICD-10-CM

## 2024-05-22 DIAGNOSIS — F43.0 ACUTE STRESS REACTION: ICD-10-CM

## 2024-05-22 DIAGNOSIS — R51.9 HEADACHE DISORDER: ICD-10-CM

## 2024-05-22 DIAGNOSIS — F41.1 GAD (GENERALIZED ANXIETY DISORDER): ICD-10-CM

## 2024-05-22 RX ORDER — METFORMIN HYDROCHLORIDE 750 MG/1
1500 TABLET, EXTENDED RELEASE ORAL DAILY
Qty: 180 TABLET | Refills: 0 | Status: CANCELLED | OUTPATIENT
Start: 2024-05-22

## 2024-05-23 RX ORDER — ESCITALOPRAM OXALATE 20 MG/1
20 TABLET ORAL DAILY
Qty: 30 TABLET | Refills: 0 | Status: SHIPPED | OUTPATIENT
Start: 2024-05-23

## 2024-05-23 RX ORDER — ONDANSETRON 4 MG/1
4 TABLET, FILM COATED ORAL EVERY 8 HOURS PRN
Qty: 20 TABLET | Refills: 0 | Status: SHIPPED | OUTPATIENT
Start: 2024-05-23

## 2024-05-23 RX ORDER — ALPRAZOLAM 0.25 MG/1
0.25 TABLET ORAL
Qty: 30 TABLET | Refills: 0 | Status: SHIPPED | OUTPATIENT
Start: 2024-05-23

## 2024-05-23 RX ORDER — METOPROLOL SUCCINATE 25 MG/1
TABLET, EXTENDED RELEASE ORAL
Qty: 90 TABLET | Refills: 0 | Status: SHIPPED | OUTPATIENT
Start: 2024-05-23

## 2024-06-27 DIAGNOSIS — F43.0 ACUTE STRESS REACTION: ICD-10-CM

## 2024-06-27 DIAGNOSIS — F41.1 GAD (GENERALIZED ANXIETY DISORDER): ICD-10-CM

## 2024-06-27 DIAGNOSIS — G47.09 OTHER INSOMNIA: ICD-10-CM

## 2024-06-28 DIAGNOSIS — G47.09 OTHER INSOMNIA: ICD-10-CM

## 2024-06-28 DIAGNOSIS — F41.1 GAD (GENERALIZED ANXIETY DISORDER): ICD-10-CM

## 2024-06-28 DIAGNOSIS — F43.0 ACUTE STRESS REACTION: ICD-10-CM

## 2024-06-28 RX ORDER — ALPRAZOLAM 0.25 MG/1
0.25 TABLET ORAL
Qty: 30 TABLET | Refills: 0 | OUTPATIENT
Start: 2024-06-28

## 2024-06-28 RX ORDER — ESCITALOPRAM OXALATE 20 MG/1
20 TABLET ORAL DAILY
Qty: 30 TABLET | Refills: 0 | OUTPATIENT
Start: 2024-06-28

## 2024-07-02 ENCOUNTER — PATIENT MESSAGE (OUTPATIENT)
Dept: FAMILY MEDICINE CLINIC | Facility: CLINIC | Age: 44
End: 2024-07-02

## 2024-07-02 DIAGNOSIS — F41.1 GAD (GENERALIZED ANXIETY DISORDER): ICD-10-CM

## 2024-07-02 DIAGNOSIS — G47.09 OTHER INSOMNIA: ICD-10-CM

## 2024-07-02 DIAGNOSIS — F43.0 ACUTE STRESS REACTION: ICD-10-CM

## 2024-07-03 ENCOUNTER — TELEPHONE (OUTPATIENT)
Dept: FAMILY MEDICINE CLINIC | Facility: CLINIC | Age: 44
End: 2024-07-03

## 2024-07-03 DIAGNOSIS — G47.09 OTHER INSOMNIA: ICD-10-CM

## 2024-07-03 RX ORDER — ALPRAZOLAM 0.25 MG/1
0.25 TABLET ORAL
Qty: 30 TABLET | Refills: 2 | Status: SHIPPED | OUTPATIENT
Start: 2024-07-03 | End: 2024-07-03

## 2024-07-03 RX ORDER — ESCITALOPRAM OXALATE 20 MG/1
20 TABLET ORAL DAILY
Qty: 90 TABLET | Refills: 2 | Status: SHIPPED | OUTPATIENT
Start: 2024-07-03

## 2024-07-03 NOTE — TELEPHONE ENCOUNTER
Please read message and advise  Requesting refills of lexapro and xanax   Appt scheduled for 07/18   Please approve or deny pending Rxs

## 2024-07-03 NOTE — TELEPHONE ENCOUNTER
From: Audelia Calvo  To: Deondre Anguiano  Sent: 7/2/2024 6:26 PM CDT  Subject: Medication request denial    A refill request was placed for my Lexapro and Xanax prescriptions. They were both denied, and when I reached out to the pharmacy, they said they have no current prescriptions. I am completely out of my Lexapro and the last refill was made on May 23, 2024 with no refill option. Could you please let me know why my prescriptions are getting denied for refills. They should be sent to the [facility] in [place]. I’m not sure if they were sent somewhere else and I wasn’t made aware or if there is another problem. If someone could, please let me know as soon as possible so that I can get back on my Lexapro. I have gone without for a week.     Thank you

## 2024-07-03 NOTE — TELEPHONE ENCOUNTER
Ingrid from [facility] called for clarification on ALPRAZolam 0.25 MG Oral Tab . Needs to know quantity Dr wants to dispense at this time. 30 or 14. Note is 14 days only appt due for further. Quantity shows 30 tablet.

## 2024-07-03 NOTE — TELEPHONE ENCOUNTER
Please clarify if you want 14 pills dispensed on alprazolam or 30  \"Note is 14 days only appt due for further. Quantity shows 30 tablet.\"

## 2024-07-08 RX ORDER — ALPRAZOLAM 0.25 MG/1
0.25 TABLET ORAL
Qty: 30 TABLET | Refills: 1 | Status: SHIPPED | OUTPATIENT
Start: 2024-07-08

## 2024-07-18 ENCOUNTER — LAB ENCOUNTER (OUTPATIENT)
Dept: LAB | Age: 44
End: 2024-07-18
Attending: FAMILY MEDICINE
Payer: COMMERCIAL

## 2024-07-18 ENCOUNTER — OFFICE VISIT (OUTPATIENT)
Dept: HEMATOLOGY/ONCOLOGY | Age: 44
End: 2024-07-18
Attending: INTERNAL MEDICINE
Payer: COMMERCIAL

## 2024-07-18 ENCOUNTER — OFFICE VISIT (OUTPATIENT)
Dept: FAMILY MEDICINE CLINIC | Facility: CLINIC | Age: 44
End: 2024-07-18
Payer: COMMERCIAL

## 2024-07-18 DIAGNOSIS — R23.2 HOT FLASHES: ICD-10-CM

## 2024-07-18 DIAGNOSIS — F43.0 ACUTE STRESS REACTION: ICD-10-CM

## 2024-07-18 DIAGNOSIS — F41.1 GAD (GENERALIZED ANXIETY DISORDER): ICD-10-CM

## 2024-07-18 DIAGNOSIS — E83.110 HEREDITARY HEMOCHROMATOSIS (HCC): Primary | ICD-10-CM

## 2024-07-18 DIAGNOSIS — Z00.00 ANNUAL PHYSICAL EXAM: ICD-10-CM

## 2024-07-18 DIAGNOSIS — Z00.00 ANNUAL PHYSICAL EXAM: Primary | ICD-10-CM

## 2024-07-18 DIAGNOSIS — E83.110 HEREDITARY HEMOCHROMATOSIS (HCC): ICD-10-CM

## 2024-07-18 LAB
ALBUMIN SERPL-MCNC: 5.2 G/DL (ref 3.2–4.8)
ALBUMIN/GLOB SERPL: 1.7 {RATIO} (ref 1–2)
ALP LIVER SERPL-CCNC: 137 U/L
ALT SERPL-CCNC: 84 U/L
ANION GAP SERPL CALC-SCNC: 7 MMOL/L (ref 0–18)
AST SERPL-CCNC: 51 U/L (ref ?–34)
BASOPHILS # BLD AUTO: 0.07 X10(3) UL (ref 0–0.2)
BASOPHILS NFR BLD AUTO: 0.9 %
BILIRUB SERPL-MCNC: 0.6 MG/DL (ref 0.3–1.2)
BUN BLD-MCNC: 12 MG/DL (ref 9–23)
CALCIUM BLD-MCNC: 10.3 MG/DL (ref 8.7–10.4)
CHLORIDE SERPL-SCNC: 106 MMOL/L (ref 98–112)
CHOLEST SERPL-MCNC: 207 MG/DL (ref ?–200)
CO2 SERPL-SCNC: 24 MMOL/L (ref 21–32)
CREAT BLD-MCNC: 0.7 MG/DL
DEPRECATED HBV CORE AB SER IA-ACNC: 214.9 NG/ML
EGFRCR SERPLBLD CKD-EPI 2021: 108 ML/MIN/1.73M2 (ref 60–?)
EOSINOPHIL # BLD AUTO: 0.12 X10(3) UL (ref 0–0.7)
EOSINOPHIL NFR BLD AUTO: 1.5 %
ERYTHROCYTE [DISTWIDTH] IN BLOOD BY AUTOMATED COUNT: 12.2 %
FASTING PATIENT LIPID ANSWER: YES
FASTING STATUS PATIENT QL REPORTED: YES
GLOBULIN PLAS-MCNC: 3 G/DL (ref 2.8–4.4)
GLUCOSE BLD-MCNC: 140 MG/DL (ref 70–99)
HCT VFR BLD AUTO: 47.6 %
HDLC SERPL-MCNC: 47 MG/DL (ref 40–59)
HGB BLD-MCNC: 16.6 G/DL
IMM GRANULOCYTES # BLD AUTO: 0.02 X10(3) UL (ref 0–1)
IMM GRANULOCYTES NFR BLD: 0.2 %
IRON SATN MFR SERPL: 51 %
IRON SERPL-MCNC: 149 UG/DL
LDLC SERPL CALC-MCNC: 117 MG/DL (ref ?–100)
LYMPHOCYTES # BLD AUTO: 2.97 X10(3) UL (ref 1–4)
LYMPHOCYTES NFR BLD AUTO: 36.4 %
MCH RBC QN AUTO: 35 PG (ref 26–34)
MCHC RBC AUTO-ENTMCNC: 34.9 G/DL (ref 31–37)
MCV RBC AUTO: 100.4 FL
MONOCYTES # BLD AUTO: 0.35 X10(3) UL (ref 0.1–1)
MONOCYTES NFR BLD AUTO: 4.3 %
NEUTROPHILS # BLD AUTO: 4.63 X10 (3) UL (ref 1.5–7.7)
NEUTROPHILS # BLD AUTO: 4.63 X10(3) UL (ref 1.5–7.7)
NEUTROPHILS NFR BLD AUTO: 56.7 %
NONHDLC SERPL-MCNC: 160 MG/DL (ref ?–130)
OSMOLALITY SERPL CALC.SUM OF ELEC: 286 MOSM/KG (ref 275–295)
PLATELET # BLD AUTO: 293 10(3)UL (ref 150–450)
POTASSIUM SERPL-SCNC: 5.1 MMOL/L (ref 3.5–5.1)
PROT SERPL-MCNC: 8.2 G/DL (ref 5.7–8.2)
RBC # BLD AUTO: 4.74 X10(6)UL
SODIUM SERPL-SCNC: 137 MMOL/L (ref 136–145)
TOTAL IRON BINDING CAPACITY: 293 UG/DL (ref 250–425)
TRANSFERRIN SERPL-MCNC: 232 MG/DL (ref 250–380)
TRIGL SERPL-MCNC: 246 MG/DL (ref 30–149)
TSI SER-ACNC: 3.19 MIU/ML (ref 0.55–4.78)
VLDLC SERPL CALC-MCNC: 43 MG/DL (ref 0–30)
WBC # BLD AUTO: 8.2 X10(3) UL (ref 4–11)

## 2024-07-18 PROCEDURE — 84443 ASSAY THYROID STIM HORMONE: CPT | Performed by: FAMILY MEDICINE

## 2024-07-18 PROCEDURE — 99214 OFFICE O/P EST MOD 30 MIN: CPT | Performed by: FAMILY MEDICINE

## 2024-07-18 PROCEDURE — 99396 PREV VISIT EST AGE 40-64: CPT | Performed by: FAMILY MEDICINE

## 2024-07-18 PROCEDURE — 80061 LIPID PANEL: CPT | Performed by: FAMILY MEDICINE

## 2024-07-18 RX ORDER — ESCITALOPRAM OXALATE 20 MG/1
20 TABLET ORAL DAILY
Qty: 90 TABLET | Refills: 4 | Status: SHIPPED | OUTPATIENT
Start: 2024-07-18

## 2024-07-18 RX ORDER — ESCITALOPRAM OXALATE 20 MG/1
20 TABLET ORAL DAILY
Qty: 90 TABLET | Refills: 2 | Status: SHIPPED | OUTPATIENT
Start: 2024-07-18 | End: 2024-07-18

## 2024-07-22 NOTE — PROGRESS NOTES
HPI:    Audelia Calvo is a 44 year old female who presents for Physical (Pt req to reassess BP )   Patient presents for recheck of her hypertension. Pt has been taking medications as instructed, no medication side effects, home BP monitoring in the range of 120's systolic and 70's diastolic.   Taking rx with no side effects.   MAKAYLA-taking lexapro, has been out of rx with stable mood, with increased in anxiety, takes xanax prn.     Past History:   She  has a past medical history of JACKSON favor benign (2012), Allergic rhinitis, Anxiety, Anxiety state, Back problem, Blood disorder, Depression, Dysplasia of cervix, low grade (DEE 1) (2012), Essential hypertension (2022), Hemochromatosis, IBS, Trouble in sleeping, and Visual impairment.   She  has a past surgical history that includes colonoscopy,diagnostic (2011); colonoscopy; colposcopy, cervix w/upper adjacent vagina; w/endocervical curettage (); endometrial bx conjunct w/colposcopy (); dilation/curettage,diagnostic (); hysterectomy (2014); endometrial ablation (10/21/2013); back surgery (10/23/2018); and  (, ).   Her family history includes Breast Cancer (age of onset: 42) in her paternal aunt; Cancer in her father and paternal grandmother; Cancer (age of onset: 35) in her maternal aunt; Cancer (age of onset: 40) in her maternal aunt; Cancer (age of onset: 42) in her paternal aunt; Cancer (age of onset: 50) in her maternal aunt; Dementia in her maternal grandfather; Heart Disorder in her maternal grandfather, mother, and paternal grandmother; Hypertension in her brother, brother, mother, sister, and sister; Lipids in her mother and sister; Other in her sister; Stroke in her mother.   She  reports that she has never smoked. She has never used smokeless tobacco. She reports that she does not currently use alcohol. She reports that she does not use drugs.     She is not on any long-term medications.   She is allergic to  seasonal.     Current Outpatient Medications on File Prior to Visit   Medication Sig    ALPRAZolam 0.25 MG Oral Tab Take 1 tablet (0.25 mg total) by mouth daily as needed for Anxiety.    ondansetron (ZOFRAN) 4 mg tablet Take 1 tablet (4 mg total) by mouth every 8 (eight) hours as needed for Nausea.    metoprolol succinate ER 25 MG Oral Tablet 24 Hr Take 2 tablets by mouth in AM & 1 tablet by mouth at night. (For a 75mg total every day)    topiramate 25 MG Oral Tab Take 1 tablet (25 mg total) by mouth 2 (two) times daily.    hydrOXYzine 25 MG Oral Tab Take 1 tablet (25 mg total) by mouth every 8 (eight) hours as needed for Itching.    CHLORTHALIDONE 25 MG Oral Tab TAKE 1 TABLET (25 MG TOTAL) BY MOUTH DAILY.    Acetaminophen 500 MG Oral Cap Take 2 capsules (1,000 mg total) by mouth one time.     No current facility-administered medications on file prior to visit.         REVIEW OF SYSTEMS:   Patient denies shortness of breath, denies chest pain and denies any recent fevers or chills.    Patient reports no urinary complaints and denies headaches or visual disturbances.   Patient denies any abdominal pain at this time. Patient has no new skin lesions.  Patient reports no acute back pain and reports no dizziness or headaches.   Patient reports no visual disturbances and reports hearing has been about the same.   Patient reports no recent injury or trauma.               EXAM:    BP (P) 128/88   Pulse (P) 77   Temp (P) 97.8 °F (36.6 °C)   Resp (P) 14   Ht (P) 5' 4\" (1.626 m)   Wt (P) 175 lb (79.4 kg)   LMP 06/04/2014   SpO2 (P) 99%   BMI (P) 30.04 kg/m²  Estimated body mass index is 30.04 kg/m² (pended) as calculated from the following:    Height as of this encounter: (P) 5' 4\" (1.626 m).    Weight as of this encounter: (P) 175 lb (79.4 kg).    General Appearance:  Alert, cooperative, no distress, appears stated age   Head:  Normocephalic, without obvious abnormality, atraumatic   Eyes:  conjunctiva/cornea is not  erythematous.        Nose: No nasal drainage.    Throat: No erythema    Neck: Supple, symmetrical, trachea midline, and normal ROM  thyroid: no obvious nodules   Back:   Symmetric, no curvature, ROM normal, no CVA tenderness   Lungs:   Clear to auscultation bilaterally, respirations unlabored   Chest Wall:  No tenderness or deformity   Heart:  Regular rate and rhythm, S1, S2 normal, no murmur,   Abdomen:   Soft, non-tender, bowel sounds active. No hernia.    Genitalia:     Rectal:     Extremities: Extremities normal, atraumatic, no cyanosis or edema   Pulses: 2+ and symmetric   Skin: Skin color, texture, turgor normal, no new rashes    Lymph nodes: No obvious cervical adenopathy.    Neurologic and psych: Normal speech, Alert and oriented x 3.   Normal mood, normal insight and judgment.                    ASSESSMENT AND PLAN:   1. Annual physical exam  -wellness visit was done.   - CBC With Differential With Platelet; Future  - Comp Metabolic Panel (14); Future  - Lipid Panel; Future  - TSH W Reflex To Free T4; Future    2. MAKAYLA (generalized anxiety disorder)  -stable, CPM  - escitalopram 20 MG Oral Tab; Take 1 tablet (20 mg total) by mouth daily.  Dispense: 90 tablet; Refill: 4  - CBC With Differential With Platelet; Future  - Comp Metabolic Panel (14); Future  - Lipid Panel; Future  - TSH W Reflex To Free T4; Future    3. Acute stress reaction  -stable, CPM  - escitalopram 20 MG Oral Tab; Take 1 tablet (20 mg total) by mouth daily.  Dispense: 90 tablet; Refill: 4  - CBC With Differential With Platelet; Future  - Comp Metabolic Panel (14); Future  - Lipid Panel; Future  - TSH W Reflex To Free T4; Future    4. Hot flashes  -will refer to GYN for possible HRT  - OBG Referral - In Network     Deondre Anguiano MD,

## 2024-08-16 ENCOUNTER — APPOINTMENT (OUTPATIENT)
Dept: HEMATOLOGY/ONCOLOGY | Facility: HOSPITAL | Age: 44
End: 2024-08-16
Attending: INTERNAL MEDICINE
Payer: COMMERCIAL

## 2024-08-23 ENCOUNTER — APPOINTMENT (OUTPATIENT)
Dept: HEMATOLOGY/ONCOLOGY | Facility: HOSPITAL | Age: 44
End: 2024-08-23
Attending: INTERNAL MEDICINE
Payer: COMMERCIAL

## 2024-08-23 ENCOUNTER — PATIENT MESSAGE (OUTPATIENT)
Dept: HEMATOLOGY/ONCOLOGY | Facility: HOSPITAL | Age: 44
End: 2024-08-23

## 2024-08-30 ENCOUNTER — OFFICE VISIT (OUTPATIENT)
Dept: HEMATOLOGY/ONCOLOGY | Facility: HOSPITAL | Age: 44
End: 2024-08-30
Attending: INTERNAL MEDICINE
Payer: COMMERCIAL

## 2024-08-30 VITALS
SYSTOLIC BLOOD PRESSURE: 160 MMHG | WEIGHT: 180 LBS | DIASTOLIC BLOOD PRESSURE: 105 MMHG | HEART RATE: 84 BPM | HEIGHT: 64.02 IN | RESPIRATION RATE: 16 BRPM | BODY MASS INDEX: 30.73 KG/M2 | OXYGEN SATURATION: 94 % | TEMPERATURE: 98 F

## 2024-08-30 DIAGNOSIS — E83.110 HEREDITARY HEMOCHROMATOSIS (HCC): ICD-10-CM

## 2024-08-30 LAB
ALBUMIN SERPL-MCNC: 4.9 G/DL (ref 3.2–4.8)
ALBUMIN/GLOB SERPL: 2 {RATIO} (ref 1–2)
ALP LIVER SERPL-CCNC: 132 U/L
ALT SERPL-CCNC: 95 U/L
ANION GAP SERPL CALC-SCNC: 12 MMOL/L (ref 0–18)
AST SERPL-CCNC: 57 U/L (ref ?–34)
BASOPHILS # BLD AUTO: 0.06 X10(3) UL (ref 0–0.2)
BASOPHILS NFR BLD AUTO: 0.8 %
BILIRUB SERPL-MCNC: 0.4 MG/DL (ref 0.3–1.2)
BUN BLD-MCNC: 8 MG/DL (ref 9–23)
CALCIUM BLD-MCNC: 9.9 MG/DL (ref 8.7–10.4)
CHLORIDE SERPL-SCNC: 102 MMOL/L (ref 98–112)
CO2 SERPL-SCNC: 25 MMOL/L (ref 21–32)
CREAT BLD-MCNC: 0.85 MG/DL
DEPRECATED HBV CORE AB SER IA-ACNC: 446 NG/ML
EGFRCR SERPLBLD CKD-EPI 2021: 86 ML/MIN/1.73M2 (ref 60–?)
EOSINOPHIL # BLD AUTO: 0.09 X10(3) UL (ref 0–0.7)
EOSINOPHIL NFR BLD AUTO: 1.2 %
ERYTHROCYTE [DISTWIDTH] IN BLOOD BY AUTOMATED COUNT: 11.9 %
GLOBULIN PLAS-MCNC: 2.4 G/DL (ref 2–3.5)
GLUCOSE BLD-MCNC: 249 MG/DL (ref 70–99)
HCT VFR BLD AUTO: 42.4 %
HGB BLD-MCNC: 15.6 G/DL
IMM GRANULOCYTES # BLD AUTO: 0.03 X10(3) UL (ref 0–1)
IMM GRANULOCYTES NFR BLD: 0.4 %
IRON SATN MFR SERPL: 76 %
IRON SERPL-MCNC: 185 UG/DL
LYMPHOCYTES # BLD AUTO: 2.69 X10(3) UL (ref 1–4)
LYMPHOCYTES NFR BLD AUTO: 34.8 %
MCH RBC QN AUTO: 34.9 PG (ref 26–34)
MCHC RBC AUTO-ENTMCNC: 36.8 G/DL (ref 31–37)
MCV RBC AUTO: 94.9 FL
MONOCYTES # BLD AUTO: 0.36 X10(3) UL (ref 0.1–1)
MONOCYTES NFR BLD AUTO: 4.7 %
NEUTROPHILS # BLD AUTO: 4.51 X10 (3) UL (ref 1.5–7.7)
NEUTROPHILS # BLD AUTO: 4.51 X10(3) UL (ref 1.5–7.7)
NEUTROPHILS NFR BLD AUTO: 58.1 %
OSMOLALITY SERPL CALC.SUM OF ELEC: 295 MOSM/KG (ref 275–295)
PLATELET # BLD AUTO: 318 10(3)UL (ref 150–450)
PLATELETS.RETICULATED NFR BLD AUTO: 6.9 % (ref 0–7)
POTASSIUM SERPL-SCNC: 3.4 MMOL/L (ref 3.5–5.1)
PROT SERPL-MCNC: 7.3 G/DL (ref 5.7–8.2)
RBC # BLD AUTO: 4.47 X10(6)UL
SODIUM SERPL-SCNC: 139 MMOL/L (ref 136–145)
TOTAL IRON BINDING CAPACITY: 244 UG/DL (ref 250–425)
TRANSFERRIN SERPL-MCNC: 187 MG/DL (ref 250–380)
WBC # BLD AUTO: 7.7 X10(3) UL (ref 4–11)

## 2024-08-30 PROCEDURE — 99214 OFFICE O/P EST MOD 30 MIN: CPT | Performed by: INTERNAL MEDICINE

## 2024-08-30 NOTE — PROGRESS NOTES
Pt here for follow up. Last seen Oct 2022  No new complaints.    Outpatient Oncology Care Plan  Problem list:  knowledge deficit    Problems related to:    disease/disease progression    Interventions:  provided general teaching    Expected outcomes:  understands plan of care    Progress towards outcome:  making progress    Education Record    Learner:  Patient  Barriers / Limitations:  None  Method:  Brief focused  Outcome:  Shows understanding  Comments:

## 2024-08-30 NOTE — PROGRESS NOTES
Cancer Center Progress Note  Patient Name: Audelia Calvo   YOB: 1980   Medical Record Number: [id number]     Attending Physician: Silvio Guerrero M.D.       Date of Visit: 8/30/2024      Chief Complaint:  Chief Complaint   Patient presents with    Follow - Up        Oncologic History:  Audelia Calvo is a 44 year old female referred for evaluation and treatment of her iron overload. The patient reports that her iron levels were initially checked in February, 2015, as part of a workup for fatigue. It was mildly elevated at the time and the decision was made to repeat the test in 6 months. It ws repeated in October at an outside facility, revealing a ferritin of 421. Her iron was elevated at 183 and her Fe sat was 80%. She was referred for evaluation. There is no family history of iron overload or liver failure. She lives in a relatively new house (no karma pipes) and her water source is city water (no well water). She denies a history of diabetes or thyroid problems. No abdominal pain.     When her repeat Iron studies worsened again, prompting hemochromatosis studies to be ordered.  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     History of Present Illness:  Pt is here for follow up.  She feels well.    Performance Status:  ECOG 0    Past Medical History:  Past Medical History:    JACKSON favor benign    Allergic rhinitis    Anxiety    Anxiety state    Back problem    Blood disorder    HEMOCHROMATOSIS    Depression    Dysplasia of cervix, low grade (DEE 1)    Essential hypertension    Hemochromatosis    IBS    Trouble in sleeping    Visual impairment    GLASSES       Past Surgical History:  Past Surgical History:   Procedure Laterality Date    Back surgery  10/23/2018    Colonoscopy      normal age 30, fathers sister with colon cancer age 45    Colonoscopy,diagnostic  2/25/2011    Performed by CAMERON LUNA at [facility], [facility]    Colposcopy, cervix w/upper adjacent vagina; w/endocervical  curettage      cervicitis    Dilation/curettage,diagnostic      removal benign polyps    Endometrial ablation  10/21/2013    Endometrial bx conjunct w/colposcopy      Hysterectomy  2014    partial, still has ovaries      ,        Family History:  Family History   Problem Relation Age of Onset    Hypertension Mother     Lipids Mother     Stroke Mother     Heart Disorder Mother     Hypertension Sister     Hypertension Sister     Lipids Sister     Other (Other) Sister         Anuerysm    Hypertension Brother     Hypertension Brother     Heart Disorder Maternal Grandfather     Dementia Maternal Grandfather     Cancer Paternal Grandmother         Skin cancer    Heart Disorder Paternal Grandmother     Cancer Maternal Aunt 40        breast    Cancer Maternal Aunt 35        Cervical    Cancer Maternal Aunt 50        colon    Cancer Paternal Aunt 42        breast    Breast Cancer Paternal Aunt 42        dx age- 42 and 55    Cancer Father        Social History:  Social History     Socioeconomic History    Marital status:      Spouse name: Not on file    Number of children: Not on file    Years of education: Not on file    Highest education level: Not on file   Occupational History    Not on file   Tobacco Use    Smoking status: Never    Smokeless tobacco: Never   Vaping Use    Vaping status: Never Used   Substance and Sexual Activity    Alcohol use: Not Currently     Alcohol/week: 0.0 - 1.0 standard drinks of alcohol     Comment: Socially    Drug use: No    Sexual activity: Yes     Partners: Male     Birth control/protection: Vasectomy, Hysterectomy   Other Topics Concern    Caffeine Concern No    Exercise No    Seat Belt No    Special Diet No    Stress Concern Yes     Comment: Stressed with son dealing with depression and anxiety    Weight Concern Yes     Comment: Unable to lose weight.     Service Not Asked    Blood Transfusions Not Asked    Occupational Exposure Not Asked     Hobby Hazards Not Asked    Sleep Concern Not Asked    Back Care Not Asked    Bike Helmet Not Asked    Self-Exams Not Asked   Social History Narrative    Not on file     Social Determinants of Health     Financial Resource Strain: Not on file   Food Insecurity: Not on file   Transportation Needs: Not on file   Physical Activity: Not on file   Stress: Not on file   Social Connections: Not on file   Housing Stability: Not on file       Current Medications:    Current Outpatient Medications:     escitalopram 20 MG Oral Tab, Take 1 tablet (20 mg total) by mouth daily., Disp: 90 tablet, Rfl: 4    ALPRAZolam 0.25 MG Oral Tab, Take 1 tablet (0.25 mg total) by mouth daily as needed for Anxiety., Disp: 30 tablet, Rfl: 1    ondansetron (ZOFRAN) 4 mg tablet, Take 1 tablet (4 mg total) by mouth every 8 (eight) hours as needed for Nausea., Disp: 20 tablet, Rfl: 0    metoprolol succinate ER 25 MG Oral Tablet 24 Hr, Take 2 tablets by mouth in AM & 1 tablet by mouth at night. (For a 75mg total every day), Disp: 90 tablet, Rfl: 0    hydrOXYzine 25 MG Oral Tab, Take 1 tablet (25 mg total) by mouth every 8 (eight) hours as needed for Itching., Disp: 30 tablet, Rfl: 1    CHLORTHALIDONE 25 MG Oral Tab, TAKE 1 TABLET (25 MG TOTAL) BY MOUTH DAILY., Disp: 90 tablet, Rfl: 1    Acetaminophen 500 MG Oral Cap, Take 2 capsules (1,000 mg total) by mouth one time., Disp: , Rfl:     Allergies:  Allergies   Allergen Reactions    Seasonal Runny nose        Review of Systems:    Constitutional No fevers, chills, night sweats, excessive fatigue or weight loss.   Eyes No significant visual difficulties. No diplopia. No yellowing of the eyes.   Hematologic/Lymphatic No easy bruising or bleeding.  No any tender or palpable lymph nodes.   Respiratory No dyspnea, Pleuritic chest pain, cough or hemoptysis.   Cardiovascular No anginal chest pain, palpitations or orthopnea.   Gastrointestinal No nausea, vomiting, diarrhea, GI bleeding, or constipation.    Genitorurinary (M) No hematuria, dysuria, or incontinence. No abnormal bleeding.   Integumentary No rashes or yellowing of the skin   Neurologic No headache, blurred vision, and no areas of focal weakness. Normal gait.   Psychiatric No insomnia, depression, katy or mood swings.       Vital Signs:  BP (!) 160/105 (BP Location: Left arm, Patient Position: Sitting, Cuff Size: large)   Pulse 84   Temp 97.9 °F (36.6 °C) (Temporal)   Resp 16   Ht 1.626 m (5' 4.02\")   Wt 81.6 kg (180 lb)   LMP 06/04/2014   SpO2 94%   BMI 30.88 kg/m²         Physical Examination:    Constitutional Normal - Mood and affect appropriate. Appears close to chronological age. Well nourished. Well developed.   Eyes Normal - Conjunctivae and sclerae are clear and without icterus. Pupils are reactive and equal.   Hematologic/Lymphatic Normal - No petechiae or purpura.  No tender or palpable lymph nodes in the cervical, supraclavicular, axillary or inguinal area.   Respiratory Normal - Lungs are clear to auscultation, no rhonchi or wheezing.   Cardiovascular Normal - Regular rate and rhythm, no murmurs, gallops or rubs.   Abdomen Normal - Non-tender, non-distended, no masses, ascites or hepatosplenomegaly.    Extremities Normal - No visible deformities, no cyanosis, clubbing or edema.    Integumentary Normal - No rashes, No Jaundice   Neurologic Normal - No sensory or motor deficits, normal cerebellar function, normal gait, cranial nerves intact.   Psychiatric Normal - Alert and oriented times three. Coherent speech. Verbalizes understanding of our discussions today.             Laboratory:  Recent Labs     08/30/24  1404   RBC 4.47   HGB 15.6   HCT 42.4   MCV 94.9   MCH 34.9 H   MCHC 36.8   RDW 11.9   NEPRELIM 4.51   WBC 7.7   .0     Recent Labs     08/30/24  1404    H   BUN 8 L   CREATSERUM 0.85   CA 9.9   ALB 4.9 H      K 3.4 L      CO2 25.0   ALKPHO 132 H   AST 57 H   ALT 95 H   BILT 0.4   TP 7.3      Latest  Reference Range & Units 08/30/24 14:04   Iron, Serum 50 - 170 ug/dL 185 (H)   Transferrin 250 - 380 mg/dL 187 (L)   Iron Bind.Cap.(TIBC) 250 - 425 ug/dL 244 (L)   Iron Saturation 15 - 50 % 76 (H)   FERRITIN 50.0 - 306.0 ng/mL 446.0 (H)   (H): Data is abnormally high  (L): Data is abnormally low      Radiology:    Pathology:    Impression and Plan:  Hereditary Hemochromatosis: The patient is homozygous for the C282Y mutation which accounts for 80-90% of HH in people of northern  descent.  Her Fe studies were consistent with iron overload.  We initiated therapeutic phlebotomy with a goal ferritin<50 and Fe Sat<50%. She has missed several phlebotomy sessions. Will resume q3 month phlebotomy. Given that her ferritin is very high and her LFTs are elevated, I recommend Phlebotomy Monthly x3 followed by repeat labs.    Planned Follow Up:  monthly phlebotomy x3  Labs in 3 months  Follow up in 12 months    I spent * minutes face to face with the patient.  More than 50% of that time was spent counseling the patient and/or on coordination of care.  The diagnosis, prognosis, and general treatment was explained to the patient and the family.    Electronically Signed by:    Silvio Guerrero M.D.  [facility]

## 2024-09-01 DIAGNOSIS — R00.2 HEART PALPITATIONS: ICD-10-CM

## 2024-09-03 RX ORDER — METOPROLOL SUCCINATE 25 MG/1
TABLET, EXTENDED RELEASE ORAL
Qty: 90 TABLET | Refills: 0 | Status: SHIPPED | OUTPATIENT
Start: 2024-09-03

## 2024-09-06 ENCOUNTER — NURSE ONLY (OUTPATIENT)
Dept: HEMATOLOGY/ONCOLOGY | Facility: HOSPITAL | Age: 44
End: 2024-09-06
Attending: INTERNAL MEDICINE
Payer: COMMERCIAL

## 2024-09-06 VITALS
SYSTOLIC BLOOD PRESSURE: 151 MMHG | WEIGHT: 179.63 LBS | RESPIRATION RATE: 16 BRPM | TEMPERATURE: 98 F | HEART RATE: 88 BPM | BODY MASS INDEX: 31 KG/M2 | OXYGEN SATURATION: 92 % | DIASTOLIC BLOOD PRESSURE: 96 MMHG

## 2024-09-06 PROCEDURE — 99195 PHLEBOTOMY: CPT

## 2024-09-06 NOTE — PROGRESS NOTES
Patient Name: Audelia Calvo  : 1980   Medical Record #: [id number]      Therapeutic Donor History    Donor History    When was your last healthy meal? 1:30pm    When was the last time you did any strenuous activities today? NONE    Are you feeling ill or unhealthy today:  No    Do you currently have a cold, flu, sore throat, chills, respiratory infection, diarrhea, or any other active infections:  No    Have you had a dental procedure in the past 3 days:  No    Are you currently pregnant:  No    Are you allergic to iodine, latex, or chlorhexidine:  No   If yes, specify the type of reaction:      Have you ever had an adverse reaction to a blood donation or been deferred (refused) as a blood donor:  No    Have you had a blood disease, bleeding problem, or recently received blood products in the last 3 months:  No   If yes, please specify:    Have you had epilepsy, convulsions, or frequent fainting spells:  No   If yes, please specify:    History of chronic illness / major illness / surgery:  None of the above   List details on any of the above:    Are you taking pills, medications, or have you received any shots in the last 3 months:  Yes   Comments:    Current Outpatient Medications   Medication Sig Dispense Refill    metoprolol succinate ER 25 MG Oral Tablet 24 Hr Take 2 tablets by mouth in AM & 1 tablet by mouth at night. (For a 75mg total every day) 90 tablet 0    escitalopram 20 MG Oral Tab Take 1 tablet (20 mg total) by mouth daily. 90 tablet 4    ALPRAZolam 0.25 MG Oral Tab Take 1 tablet (0.25 mg total) by mouth daily as needed for Anxiety. 30 tablet 1    ondansetron (ZOFRAN) 4 mg tablet Take 1 tablet (4 mg total) by mouth every 8 (eight) hours as needed for Nausea. 20 tablet 0    hydrOXYzine 25 MG Oral Tab Take 1 tablet (25 mg total) by mouth every 8 (eight) hours as needed for Itching. 30 tablet 1    CHLORTHALIDONE 25 MG Oral Tab TAKE 1 TABLET (25 MG TOTAL) BY MOUTH DAILY. 90 tablet 1     Acetaminophen 500 MG Oral Cap Take 2 capsules (1,000 mg total) by mouth one time.         The order for therapeutic phlebotomy and donor history was reviewed.  The donor's physical assessment and history meets acceptance criteria parameters.  This was completed by Fidelina RIOS.    If the patient does not meet the criteria, please explain below:       Pathologist consulted:  No   Pathologist's name:   Outcome / Orders placed:                    Patient Name: Audelia Calvo  : 1980   Medical Record #: [id number]      Therapeutic Donor Physical and Record of Collection    Order:  Therapeutic Phlebotomy  Order Date:  2024   Expiration Date:  2025  Frequency:  Monthly  Parameters:  Therapeutic Phlebotomy for HGB > 13  Ordering Physician:  Silvio Guerrero  Fax Number:        [id number]       Acceptance Criteria:  Systolic blood pressure   mm/Hg  Diastolic blood pressure   mm/Hg  Pulse   beats per minute  Temperature  96-99.4 degrees Farenheit  Weight  >110 lbs     Hemoglobin:  15.6  Acceptance Criteria:  HGB 11 GM/DL or above (Repeat if 10.5-10.9)    The order for therapeutic phlebotomy and donor history was reviewed.  The donor's physical assessment meets acceptance criteria parameters.  This was completed by Fidelina RIOS.    If the patient does not meet the criteria, please explain below:      Collection Information    Arm used:  left    Lot number of collection bag:  [id number]    Collection bag inspection acceptable:  No    Donor signature obtained for consent:  Yes    Time collection started:  15:08    Time collection completed:  15:13    (Discontinue collection at 15 minutes)    Reaction to procedure:  No     If yes, detailed description of reaction:  NONE    Weight of unit:  625    Patient given post donation instructions:  No    Post collection blood pressure:  151/96    Collection completed by:  Fidelina RIOS    Patient discharged:  Ambulatory    Patient accompanied by:   Spouse    Patient is stable and was instructed to call the ordering physician with any questions / concerns.

## 2024-10-29 DIAGNOSIS — R00.2 HEART PALPITATIONS: ICD-10-CM

## 2024-10-29 DIAGNOSIS — G47.09 OTHER INSOMNIA: ICD-10-CM

## 2024-10-30 RX ORDER — ALPRAZOLAM 0.25 MG/1
0.25 TABLET ORAL
Qty: 30 TABLET | Refills: 1 | Status: SHIPPED | OUTPATIENT
Start: 2024-10-30

## 2024-10-30 RX ORDER — METOPROLOL SUCCINATE 25 MG/1
TABLET, EXTENDED RELEASE ORAL
Qty: 90 TABLET | Refills: 0 | Status: SHIPPED | OUTPATIENT
Start: 2024-10-30

## 2024-11-30 DIAGNOSIS — R00.2 HEART PALPITATIONS: ICD-10-CM

## 2024-12-03 RX ORDER — METOPROLOL SUCCINATE 25 MG/1
TABLET, EXTENDED RELEASE ORAL
Qty: 90 TABLET | Refills: 0 | Status: SHIPPED | OUTPATIENT
Start: 2024-12-03

## 2025-01-11 DIAGNOSIS — R51.9 HEADACHE DISORDER: ICD-10-CM

## 2025-01-11 DIAGNOSIS — G47.09 OTHER INSOMNIA: ICD-10-CM

## 2025-01-11 DIAGNOSIS — R51.9 ACUTE NONINTRACTABLE HEADACHE, UNSPECIFIED HEADACHE TYPE: ICD-10-CM

## 2025-01-13 RX ORDER — ONDANSETRON 4 MG/1
4 TABLET, FILM COATED ORAL EVERY 8 HOURS PRN
Qty: 20 TABLET | Refills: 0 | Status: SHIPPED | OUTPATIENT
Start: 2025-01-13

## 2025-01-13 RX ORDER — ALPRAZOLAM 0.25 MG/1
0.25 TABLET ORAL
Qty: 30 TABLET | Refills: 1 | Status: SHIPPED | OUTPATIENT
Start: 2025-01-13

## 2025-01-15 ENCOUNTER — NURSE ONLY (OUTPATIENT)
Age: 45
End: 2025-01-15
Attending: INTERNAL MEDICINE
Payer: COMMERCIAL

## 2025-01-15 VITALS
WEIGHT: 180 LBS | HEIGHT: 64.02 IN | OXYGEN SATURATION: 96 % | DIASTOLIC BLOOD PRESSURE: 117 MMHG | RESPIRATION RATE: 16 BRPM | HEART RATE: 74 BPM | TEMPERATURE: 96 F | SYSTOLIC BLOOD PRESSURE: 202 MMHG | BODY MASS INDEX: 30.73 KG/M2

## 2025-01-15 DIAGNOSIS — E83.110 HEREDITARY HEMOCHROMATOSIS (HCC): Primary | ICD-10-CM

## 2025-01-15 LAB — HGB BLD-MCNC: 16 G/DL

## 2025-01-15 NOTE — PROGRESS NOTES
Patient Name: Audelia Calvo  : 1980   Medical Record #: [id number]      Therapeutic Donor History    Donor History    When was your last healthy meal? lunchtime    When was the last time you did any strenuous activities today? none    Are you feeling ill or unhealthy today:  No    Do you currently have a cold, flu, sore throat, chills, respiratory infection, diarrhea, or any other active infections:  No    Have you had a dental procedure in the past 3 days:  No    Are you currently pregnant:  No    Are you allergic to iodine, latex, or chlorhexidine:  No   If yes, specify the type of reaction:      Have you ever had an adverse reaction to a blood donation or been deferred (refused) as a blood donor:  No    Have you had a blood disease, bleeding problem, or recently received blood products in the last 3 months:  No   If yes, please specify:    Have you had epilepsy, convulsions, or frequent fainting spells:  No   If yes, please specify:    History of chronic illness / major illness / surgery:  None of the above   List details on any of the above:    Are you taking pills, medications, or have you received any shots in the last 3 months:  Yes   Comments:    Current Outpatient Medications   Medication Sig Dispense Refill    ondansetron (ZOFRAN) 4 mg tablet Take 1 tablet (4 mg total) by mouth every 8 (eight) hours as needed for Nausea. 20 tablet 0    ALPRAZolam 0.25 MG Oral Tab Take 1 tablet (0.25 mg total) by mouth daily as needed for Anxiety. 30 tablet 1    metoprolol succinate ER 25 MG Oral Tablet 24 Hr Take 2 tablets by mouth in THE MORNING & 1 tablet by mouth at night. (For a 75mg total every day) 90 tablet 0    escitalopram 20 MG Oral Tab Take 1 tablet (20 mg total) by mouth daily. 90 tablet 4    hydrOXYzine 25 MG Oral Tab Take 1 tablet (25 mg total) by mouth every 8 (eight) hours as needed for Itching. 30 tablet 1    CHLORTHALIDONE 25 MG Oral Tab TAKE 1 TABLET (25 MG TOTAL) BY MOUTH DAILY. 90 tablet 1     Acetaminophen 500 MG Oral Cap Take 2 capsules (1,000 mg total) by mouth one time.         The order for therapeutic phlebotomy and donor history was reviewed.  The donor's physical assessment and history meets acceptance criteria parameters.  This was completed by Christina GARCIA    If the patient does not meet the criteria, please explain below:       Pathologist consulted:  No   Pathologist's name:   Outcome / Orders placed:                    Patient Name: Audelia Calvo  : 1980   Medical Record #: [id number]      Therapeutic Donor Physical and Record of Collection    Order:  Therapeutic Phlebotomy  Order Date:  2024   Expiration Date:  2024  Frequency:  monthly  Parameters:  Therapeutic Phlebotomy for HGB > less than 13  Ordering Physician:  Cesar  Fax Number:        BP (!) 202/117 (BP Location: Left arm, Patient Position: Sitting, Cuff Size: adult)   Pulse 74   Temp (!) 96.3 °F (35.7 °C) (Temporal)   Resp 16   Ht 1.626 m (5' 4.02\")   Wt 81.6 kg (180 lb)   LMP 2014   SpO2 96%   BMI 30.88 kg/m²       Acceptance Criteria:  Systolic blood pressure   mm/Hg  Diastolic blood pressure   mm/Hg  Pulse   beats per minute  Temperature  96-99.4 degrees Farenheit  Weight  >110 lbs     Hemoglobin:  16.0  Acceptance Criteria:  HGB 11 GM/DL or above (Repeat if 10.5-10.9)    The order for therapeutic phlebotomy and donor history was reviewed.  The donor's physical assessment meets acceptance criteria parameters.  This was completed by Christina GARCIA    If the patient does not meet the criteria, please explain below:      Collection Information    Arm used:  left    Lot number of collection bag:  [id number]    Collection bag inspection acceptable:  Yes    Donor signature obtained for consent:  Yes    Time collection started:  1626    Time collection completed:  1632    (Discontinue collection at 15 minutes)    Reaction to procedure:  No     If yes, detailed description of reaction:       Weight of unit:  615 grams    Patient given post donation instructions:  No    Post collection blood pressure:  167/109    Collection completed by:  Christina FARIAS    Patient discharged:  Ambulatory    Patient accompanied by:  Other:  self    Patient is stable and was instructed to call the ordering physician with any questions / concerns.

## 2025-01-25 DIAGNOSIS — R00.2 HEART PALPITATIONS: ICD-10-CM

## 2025-01-27 RX ORDER — METOPROLOL SUCCINATE 25 MG/1
TABLET, EXTENDED RELEASE ORAL
Qty: 90 TABLET | Refills: 0 | Status: SHIPPED | OUTPATIENT
Start: 2025-01-27

## 2025-03-25 DIAGNOSIS — R00.2 HEART PALPITATIONS: ICD-10-CM

## 2025-03-25 DIAGNOSIS — G47.09 OTHER INSOMNIA: ICD-10-CM

## 2025-03-27 RX ORDER — ALPRAZOLAM 0.25 MG
0.25 TABLET ORAL
Qty: 30 TABLET | Refills: 1 | Status: SHIPPED | OUTPATIENT
Start: 2025-03-27

## 2025-03-27 RX ORDER — METOPROLOL SUCCINATE 25 MG/1
TABLET, EXTENDED RELEASE ORAL
Qty: 90 TABLET | Refills: 0 | Status: SHIPPED | OUTPATIENT
Start: 2025-03-27

## 2025-05-08 DIAGNOSIS — R00.2 HEART PALPITATIONS: ICD-10-CM

## 2025-05-08 NOTE — TELEPHONE ENCOUNTER
Please Review. Protocol Failed; No Protocol     BP Readings from Last 3 Encounters:   01/15/25 (!) 202/117   09/06/24 (!) 151/96   08/30/24 (!) 160/105

## 2025-05-09 RX ORDER — METOPROLOL SUCCINATE 25 MG/1
TABLET, EXTENDED RELEASE ORAL
Qty: 270 TABLET | Refills: 3 | Status: SHIPPED | OUTPATIENT
Start: 2025-05-09

## 2025-08-02 ENCOUNTER — TELEPHONE (OUTPATIENT)
Dept: FAMILY MEDICINE CLINIC | Facility: CLINIC | Age: 45
End: 2025-08-02

## 2025-08-02 DIAGNOSIS — G47.09 OTHER INSOMNIA: ICD-10-CM

## 2025-08-07 ENCOUNTER — OFFICE VISIT (OUTPATIENT)
Dept: FAMILY MEDICINE CLINIC | Facility: CLINIC | Age: 45
End: 2025-08-07

## 2025-08-07 VITALS
BODY MASS INDEX: 29.02 KG/M2 | SYSTOLIC BLOOD PRESSURE: 120 MMHG | HEIGHT: 64 IN | OXYGEN SATURATION: 98 % | WEIGHT: 170 LBS | RESPIRATION RATE: 16 BRPM | DIASTOLIC BLOOD PRESSURE: 70 MMHG | HEART RATE: 80 BPM

## 2025-08-07 DIAGNOSIS — S09.90XD INJURY OF HEAD, SUBSEQUENT ENCOUNTER: ICD-10-CM

## 2025-08-07 DIAGNOSIS — Z00.00 LABORATORY EXAM ORDERED AS PART OF ROUTINE GENERAL MEDICAL EXAMINATION: ICD-10-CM

## 2025-08-07 DIAGNOSIS — S16.1XXD STRAIN OF NECK MUSCLE, SUBSEQUENT ENCOUNTER: ICD-10-CM

## 2025-08-07 DIAGNOSIS — Z12.31 ENCOUNTER FOR SCREENING MAMMOGRAM FOR MALIGNANT NEOPLASM OF BREAST: ICD-10-CM

## 2025-08-07 DIAGNOSIS — R73.9 HYPERGLYCEMIA: ICD-10-CM
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
## 2025-08-07 DIAGNOSIS — S09.93XD INJURY OF TOOTH, SUBSEQUENT ENCOUNTER: ICD-10-CM

## 2025-08-07 DIAGNOSIS — S20.214D: ICD-10-CM

## 2025-08-07 PROCEDURE — 3078F DIAST BP <80 MM HG: CPT

## 2025-08-07 PROCEDURE — 99214 OFFICE O/P EST MOD 30 MIN: CPT

## 2025-08-07 PROCEDURE — 3008F BODY MASS INDEX DOCD: CPT

## 2025-08-07 PROCEDURE — 3074F SYST BP LT 130 MM HG: CPT

## 2025-08-07 RX ORDER — ACETAMINOPHEN AND CODEINE PHOSPHATE 300; 15 MG/1; MG/1
TABLET ORAL
COMMUNITY
Start: 2025-08-06

## 2025-08-07 RX ORDER — METHYLPREDNISOLONE 4 MG/1
TABLET ORAL
COMMUNITY
Start: 2025-08-06

## 2025-08-07 RX ORDER — ALPRAZOLAM 0.25 MG
0.25 TABLET ORAL
Qty: 30 TABLET | Refills: 1 | Status: SHIPPED | OUTPATIENT
Start: 2025-08-07

## 2025-08-07 RX ORDER — NAPROXEN 500 MG/1
TABLET ORAL
Qty: 30 TABLET | Refills: 0 | Status: SHIPPED | OUTPATIENT
Start: 2025-08-07

## 2025-08-07 RX ORDER — AMOXICILLIN 500 MG/1
CAPSULE ORAL
COMMUNITY
Start: 2025-08-06

## 2025-08-07 RX ORDER — CHLORHEXIDINE GLUCONATE ORAL RINSE 1.2 MG/ML
SOLUTION DENTAL
COMMUNITY
Start: 2025-08-06

## 2025-08-07 RX ORDER — CYCLOBENZAPRINE HCL 10 MG
10 TABLET ORAL 2 TIMES DAILY
COMMUNITY

## 2025-08-10 ENCOUNTER — PATIENT MESSAGE (OUTPATIENT)
Dept: FAMILY MEDICINE CLINIC | Facility: CLINIC | Age: 45
End: 2025-08-10

## 2025-08-12 ENCOUNTER — TELEPHONE (OUTPATIENT)
Dept: FAMILY MEDICINE CLINIC | Facility: CLINIC | Age: 45
End: 2025-08-12

## 2025-08-18 ENCOUNTER — OFFICE VISIT (OUTPATIENT)
Dept: FAMILY MEDICINE CLINIC | Facility: CLINIC | Age: 45
End: 2025-08-18

## 2025-08-18 DIAGNOSIS — R07.89 CHEST WALL PAIN: ICD-10-CM

## 2025-08-18 DIAGNOSIS — Z00.00 ANNUAL PHYSICAL EXAM: Primary | ICD-10-CM

## 2025-08-18 DIAGNOSIS — R73.9 HYPERGLYCEMIA: ICD-10-CM

## 2025-08-18 DIAGNOSIS — S06.0X1D CONCUSSION WITH LOSS OF CONSCIOUSNESS OF 30 MINUTES OR LESS, SUBSEQUENT ENCOUNTER: ICD-10-CM

## 2025-08-18 DIAGNOSIS — S09.90XD INJURY OF HEAD, SUBSEQUENT ENCOUNTER: ICD-10-CM

## 2025-08-18 PROCEDURE — 3075F SYST BP GE 130 - 139MM HG: CPT | Performed by: FAMILY MEDICINE

## 2025-08-18 PROCEDURE — 3008F BODY MASS INDEX DOCD: CPT | Performed by: FAMILY MEDICINE

## 2025-08-18 PROCEDURE — 99214 OFFICE O/P EST MOD 30 MIN: CPT | Performed by: FAMILY MEDICINE

## 2025-08-18 PROCEDURE — 99396 PREV VISIT EST AGE 40-64: CPT | Performed by: FAMILY MEDICINE

## 2025-08-18 PROCEDURE — 3079F DIAST BP 80-89 MM HG: CPT | Performed by: FAMILY MEDICINE

## 2025-08-19 VITALS
SYSTOLIC BLOOD PRESSURE: 138 MMHG | HEART RATE: 93 BPM | DIASTOLIC BLOOD PRESSURE: 89 MMHG | BODY MASS INDEX: 31.76 KG/M2 | HEIGHT: 64 IN | RESPIRATION RATE: 17 BRPM | OXYGEN SATURATION: 98 % | WEIGHT: 186 LBS

## 2025-08-19 PROBLEM — R07.89 CHEST WALL PAIN: Status: ACTIVE | Noted: 2025-08-19

## 2025-08-27 ENCOUNTER — PATIENT MESSAGE (OUTPATIENT)
Dept: FAMILY MEDICINE CLINIC | Facility: CLINIC | Age: 45
End: 2025-08-27

## 2025-08-27 DIAGNOSIS — M25.519 SHOULDER PAIN, UNSPECIFIED CHRONICITY, UNSPECIFIED LATERALITY: ICD-10-CM
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
## (undated) DIAGNOSIS — Z12.31 ENCOUNTER FOR SCREENING MAMMOGRAM FOR MALIGNANT NEOPLASM OF BREAST: Primary | ICD-10-CM

## (undated) DIAGNOSIS — R61 HYPERHIDROSIS: ICD-10-CM

## (undated) DIAGNOSIS — N64.52 BREAST DISCHARGE: Primary | ICD-10-CM

## (undated) DEVICE — SUPER SPONGES,MEDIUM: Brand: KERLIX

## (undated) DEVICE — SUTURE VICRYL 0 CT-1

## (undated) DEVICE — MARKER SKIN 2 TIP

## (undated) DEVICE — GLOVE SURG SENSICARE SZ 6-1/2

## (undated) DEVICE — BANDAID COVERLET 1X3

## (undated) DEVICE — TRANSPOSAL ULTRAFLEX DUO/QUAD ULTRA CART MANIFOLD

## (undated) DEVICE — SUTURE VICRYL 2-0 CP-2

## (undated) DEVICE — REMOVER DURAPREP 3M

## (undated) DEVICE — GLOVE SURG SENSICARE SZ 7-1/2

## (undated) DEVICE — DRILL SRG OIL CRTDG MAESTRO

## (undated) DEVICE — LIGHT HANDLE

## (undated) DEVICE — PAIN TRAY: Brand: MEDLINE INDUSTRIES, INC.

## (undated) DEVICE — DERMABOND LIQUID ADHESIVE

## (undated) DEVICE — DRAIN SILICONE FLAT 10MM

## (undated) DEVICE — SUTURE MONOCRYL 4-0 PS-2

## (undated) DEVICE — 3M™ TEGADERM™ TRANSPARENT FILM DRESSING, 1626W, 4 IN X 4-3/4 IN (10 CM X 12 CM), 50 EACH/CARTON, 4 CARTON/CASE: Brand: 3M™ TEGADERM™

## (undated) DEVICE — KENDALL SCD EXPRESS SLEEVES, THIGH LENGTH, MEDIUM: Brand: KENDALL SCD

## (undated) DEVICE — LAMINECTOMY CDS: Brand: MEDLINE INDUSTRIES, INC.

## (undated) DEVICE — 3.0MM PRECISION NEURO (MATCH HEAD)

## (undated) DEVICE — NEEDLE SPINAL 22X5 405148

## (undated) DEVICE — Device

## (undated) DEVICE — STERILE POLYISOPRENE POWDER-FREE SURGICAL GLOVES: Brand: PROTEXIS

## (undated) DEVICE — DRAPE C-ARM UNIVERSAL

## (undated) DEVICE — GLOVE SURG SENSICARE SZ 7

## (undated) DEVICE — SOL  .9 1000ML BTL

## (undated) DEVICE — PREMIUM WET SKIN PREP TRAY: Brand: MEDLINE INDUSTRIES, INC.

## (undated) DEVICE — NON-ADHERENT PAD PREPACK: Brand: TELFA

## (undated) DEVICE — SCRUB EZ BETADINE 245

## (undated) DEVICE — SOLUTION ANSEP 70% ISOPRPNL

## (undated) DEVICE — FLOSEAL HEMOSTATIC MATRIX, 5ML: Brand: FLOSEAL HEMOSTATIC MATRIX

## (undated) DEVICE — PROXIMATE RH ROTATING HEAD SKIN STAPLERS (35 WIDE) CONTAINS 35 STAINLESS STEEL STAPLES: Brand: PROXIMATE

## (undated) NOTE — LETTER
Understanding Sedation for Pain Procedures  Conscious sedation is a combination of medicines used to help you relax and to block pain during your procedure; you will probably stay awake and be relaxed.  You will receive the medication through an intravenous Page: 1 of  1

## (undated) NOTE — MR AVS SNAPSHOT
[address] [facility] [address] [phone]               Thank you for choosing us for your health care visit with Ming Eugene MD.  We are glad to serve you and happy to provide you with this summary of your recent med list.                alprazolam 0.25 MG Tabs   Take 1 tablet (0.25 mg total) by mouth nightly as needed for Sleep.    Commonly known as:  XANAX           AMITIZA 8 MCG Caps   Generic drug:  lubiprostone   TAKE 1 CAPSULE BY MOUTH TWICE DAILY

## (undated) NOTE — LETTER
01/14/19        [address]  [address] [address]      Dear Emi Newton,    [facility] records indicate that you have lab work and or testing that was ordered for you and has not yet been completed:  Orders Placed This Encounter        [place]

## (undated) NOTE — LETTER
03/23/23        [address] [address]      Dear Armand Leader,    [address] records indicate that you have outstanding lab work and or testing that was ordered for you and has not yet been completed:  Orders Placed This Encounter      [place] HIDA/HEPATOBILIARY w/[facility]  ([id number])    To provide you with the best possible care, please complete these orders at your earliest convenience. If you have recently completed these orders please disregard this letter. If you have any questions please call the office at Dept: [phone].      Thank you,       Lili Birmingham PA-C

## (undated) NOTE — LETTER
07/17/19        [address]  [address]      Dear Tye Khan,    [facility] records indicate that you have outstanding lab work and or testing that was ordered for you and has not yet been completed:  Orders Placed This Encount

## (undated) NOTE — MR AVS SNAPSHOT
After Visit Summary   6/2/2017    Leslie Ra    MRN: [id number]           Diagnoses this Visit     Hemochromatosis, hereditary ([facility].)    -  Primary       Allergies     Seasonal       Your Vital Signs Were     BP Pulse Temp(Src) Resp    151/89 m For medical emergencies, dial 911.

## (undated) NOTE — LETTER
[facility]  [address] [facility], [address] [place] [address]    Consent for Operation    Date: __________________    Time: _______________    1.  I authorize the performance upon Kiley Thomas the following operation:    Procedure(s):  LEFT LUMBAR 3-4 TRA procedure has been videotaped, the surgeon will obtain the original videotape. The hospital will not be responsible for storage or maintenance of this tape.     6. For the purpose of advancing medical education, I consent to the admittance of observers to t STATEMENTS REQUIRING INSERTION OR COMPLETION WERE FILLED IN.     Signature of Patient:   ___________________________    When the patient is a minor or mentally incompetent to give consent:  Signature of person authorized to consent for patient: ____________ drugs/illegal medications). Failure to inform my anesthesiologist about these medicines may increase my risk of anesthetic complications. · If I am allergic to anything or have had a reaction to anesthesia before.     3. I understand how the anesthesia med I have discussed the procedure and information above with the patient (or patient’s representative) and answered their questions. The patient or their representative has agreed to have anesthesia services.     _______________________________________________

## (undated) NOTE — MR AVS SNAPSHOT
[address] [facility] [address] [phone]               Thank you for choosing us for your health care visit with Glenda Pearl MD.  We are glad to serve you and happy to provide you with this summary of your Screening for endocrine, nutritional, metabolic and immunity disorder        Other insomnia          Instructions and Information about Your Health     None      Allergies as of May 19, 2017     Seasonal                 Today's Vital Signs     BP Pulse Te For medical emergencies, dial 911.            Visit [facility] online at  [email or web address]

## (undated) NOTE — LETTER
23    Patient: Evelyn Irving  : 1980 Visit date: 2023    Dear  Parker Zaragoza MD    Thank you for referring Evelyn Irving to my practice. Please find my assessment and plan below. ASSESSMENT   Imp: FMHx of colon cancer in her maternal aunt  Abdominal pain suspect biliary colic  Plan: High risk screening colonoscopy. Discussed procedures and risks including bleeding and perforation with surgery to repair. Repeat abdominal US if evidence of cholelithiasis then would definitely recommend cholecystectomy.   Sincerely,       Kenia Hewitt DO   CC: No Recipients

## (undated) NOTE — MR AVS SNAPSHOT
After Visit Summary   3/21/2017    Charles Habermann    MRN: [id number]           Allergies     Seasonal       Your Vital Signs Were     BP Pulse Temp(Src) Resp    144/89 mmHg 72 99.1 °F (37.3 °C) (Tympanic) 18    Height Weight BMI SpO2    1.62 m (5'

## (undated) NOTE — LETTER
5/22/2024  Audelia Calvo  [address] [place] [address]    We take each of our patient's health very seriously and the key to maintaining your health is an annual wellness physical.  Review of your medical records shows that it is time for your annual wellness exam.  Please contact my office at your earliest convenience to schedule this appointment at [phone]  Thank You    Deondre Anguiano MD